# Patient Record
Sex: FEMALE | Race: WHITE | NOT HISPANIC OR LATINO | Employment: UNEMPLOYED | URBAN - NONMETROPOLITAN AREA
[De-identification: names, ages, dates, MRNs, and addresses within clinical notes are randomized per-mention and may not be internally consistent; named-entity substitution may affect disease eponyms.]

---

## 2017-02-22 ENCOUNTER — OFFICE VISIT (OUTPATIENT)
Dept: NEUROLOGY | Facility: CLINIC | Age: 62
End: 2017-02-22

## 2017-02-22 ENCOUNTER — LAB (OUTPATIENT)
Dept: LAB | Facility: HOSPITAL | Age: 62
End: 2017-02-22
Attending: PSYCHIATRY & NEUROLOGY

## 2017-02-22 VITALS
HEART RATE: 78 BPM | SYSTOLIC BLOOD PRESSURE: 130 MMHG | BODY MASS INDEX: 31.02 KG/M2 | RESPIRATION RATE: 16 BRPM | HEIGHT: 66 IN | WEIGHT: 193 LBS | OXYGEN SATURATION: 97 % | DIASTOLIC BLOOD PRESSURE: 72 MMHG

## 2017-02-22 DIAGNOSIS — E61.1 IRON DEFICIENCY: ICD-10-CM

## 2017-02-22 DIAGNOSIS — G40.109 LOCALIZATION-RELATED SYMPTOMATIC EPILEPSY AND EPILEPTIC SYNDROMES WITH SIMPLE PARTIAL SEIZURES, NOT INTRACTABLE, WITHOUT STATUS EPILEPTICUS (HCC): ICD-10-CM

## 2017-02-22 DIAGNOSIS — E61.1 IRON DEFICIENCY: Primary | ICD-10-CM

## 2017-02-22 LAB — FERRITIN SERPL-MCNC: 98 NG/ML (ref 11.1–264)

## 2017-02-22 PROCEDURE — 99214 OFFICE O/P EST MOD 30 MIN: CPT | Performed by: PSYCHIATRY & NEUROLOGY

## 2017-02-22 PROCEDURE — 36415 COLL VENOUS BLD VENIPUNCTURE: CPT

## 2017-02-22 PROCEDURE — 82728 ASSAY OF FERRITIN: CPT | Performed by: PSYCHIATRY & NEUROLOGY

## 2017-02-22 RX ORDER — BENZTROPINE MESYLATE 0.5 MG/1
0.5 TABLET ORAL DAILY
Refills: 2 | COMMUNITY
Start: 2017-02-03

## 2017-02-22 RX ORDER — HYDROCHLOROTHIAZIDE 25 MG/1
25 TABLET ORAL DAILY
Refills: 1 | COMMUNITY
Start: 2016-12-17 | End: 2017-08-29

## 2017-02-22 RX ORDER — METOPROLOL TARTRATE 100 MG/1
50 TABLET ORAL 2 TIMES DAILY
Refills: 0 | COMMUNITY
Start: 2017-01-10

## 2017-02-22 RX ORDER — LEVOTHYROXINE SODIUM 0.03 MG/1
25 TABLET ORAL DAILY
Refills: 3 | COMMUNITY
Start: 2017-02-07

## 2017-02-22 RX ORDER — ATORVASTATIN CALCIUM 80 MG/1
80 TABLET, FILM COATED ORAL DAILY
Refills: 5 | COMMUNITY
Start: 2017-02-07

## 2017-02-22 RX ORDER — LISINOPRIL 10 MG/1
10 TABLET ORAL DAILY
Refills: 1 | COMMUNITY
Start: 2016-12-17 | End: 2017-11-07

## 2017-02-22 NOTE — PROGRESS NOTES
[facility] NEUROLOGY [place] CONSULTATION   History of Present Illness     Date: 2/22/2017    Patient Identification  rTang Maynard is a 62 y.o. female.    Patient information was obtained from patient and parent.  History/Exam limitations: none.    CONSULTATION requested by: No ref. provider found      Chief Complaint   Tremors (Establish care)      Neurologic Problem   The patient's pertinent negatives include no syncope or weakness. Primary symptoms comment: Patient presents for tremors and jerking and follow up on an EEG.  She had an episode 13 months ago during which her hands twitched for 10 minutes but she never lost consciousness.. Episode onset: The tremors started 13 months ago, but she also developed jerking of her hands in the last month .  These happen usually at night as she is falling asleep. The neurological problem developed suddenly. Progression since onset: The tremors have been decreased with medication, but the jerks are unchanged since their onset. There was upper extremity (mainly the hands, but has noticed jerking or tremors in her toes once) focality noted. Associated symptoms include headaches. Pertinent negatives include no abdominal pain, chest pain, confusion, dizziness, fever, light-headedness, nausea, neck pain, palpitations, shortness of breath or vomiting. Treatments tried: She was started on benztropine 13 months ago and they believe that it has helped the tremors.  The treatment provided mild relief. Her past medical history is significant for a CVA (Possible TIA when at the [place]). There is no history of dementia or seizures. (Was diagnosed with schizophrenia in November 2010 and has been on medcation since.)        PMH:   Past Medical History   Diagnosis Date   • Anxiety    • Depression    • Heart disease    • Hyperlipidemia    • Hypertension    • Psychosis    • Sleep apnea    • Thyroid condition        Past Surgical History: History reviewed. No pertinent past  surgical history.    Family Hisotry:   Family History   Problem Relation Age of Onset   • Heart disease Mother    • Hypertension Mother    • Alcohol abuse Father        Social History:   Social History     Social History   • Marital status:      Spouse name: N/A   • Number of children: N/A   • Years of education: N/A     Occupational History   • Not on file.     Social History Main Topics   • Smoking status: Never Smoker   • Smokeless tobacco: Never Used   • Alcohol use No   • Drug use: No   • Sexual activity: Not on file     Other Topics Concern   • Not on file     Social History Narrative   • No narrative on file       Medications:   Current Outpatient Prescriptions   Medication Sig Dispense Refill   • atorvastatin (LIPITOR) 80 MG tablet   5   • benztropine (COGENTIN) 0.5 MG tablet   2   • hydrochlorothiazide (HYDRODIURIL) 25 MG tablet   1   • INVEGA SUSTENNA 156 MG/ML suspension IM injection   2   • levothyroxine (SYNTHROID, LEVOTHROID) 25 MCG tablet   3   • lisinopril (PRINIVIL,ZESTRIL) 10 MG tablet   1   • metFORMIN (GLUCOPHAGE) 500 MG tablet   3   • metoprolol tartrate (LOPRESSOR) 100 MG tablet   0     No current facility-administered medications for this visit.        Allergy: No Known Allergies    Review of Systems:  Review of Systems   Constitutional: Negative for chills and fever.   HENT: Negative for congestion, ear pain, hearing loss, rhinorrhea and sore throat.    Eyes: Negative for pain, discharge and redness.   Respiratory: Negative for cough, shortness of breath, wheezing and stridor.    Cardiovascular: Negative for chest pain, palpitations and leg swelling.   Gastrointestinal: Negative for abdominal pain, constipation, nausea and vomiting.   Endocrine: Negative for cold intolerance, heat intolerance and polyphagia.   Genitourinary: Negative for dysuria, flank pain, frequency and urgency.   Musculoskeletal: Negative for joint swelling, myalgias, neck pain and neck stiffness.   Skin: Negative  "for pallor, rash and wound.   Allergic/Immunologic: Negative for environmental allergies.   Neurological: Positive for seizures and headaches. Negative for dizziness, tremors, syncope, facial asymmetry, speech difficulty, weakness, light-headedness and numbness.   Hematological: Negative for adenopathy.   Psychiatric/Behavioral: Negative for confusion and hallucinations. The patient is not nervous/anxious.        Physical Exam     Vitals:    02/22/17 1052   BP: 130/72   BP Location: Left arm   Patient Position: Sitting   Pulse: 78   Resp: 16   SpO2: 97%   Weight: 193 lb (87.5 kg)   Height: 66\" (167.6 cm)     GENERAL: Patient is pleasant, cooperative, appears to be stated age.  Body habitus is endomorphic.  SKIN AND EXTREMITIES:  No skin rashes or lesions are noted.  No cyanosis, clubbing or edema of the extremities.    HEAD:  Head is normocephalic and atraumatic.    NECK: Neck are non-tender without thyromegaly or adenopathy.  Carotic upstrokes are 1+/4.  No cranial or cervical bruits.  The neck is supple with a full range of motion.   ENT: palate elevate symmetrically, no evidence of high arch palate, tongue midline erythema in posterior pharynx, Mallampati Classification Class III   CARDIOVASCULAR:  Regular rate and rhythm with normal S1 and S2 without rub or gallop.  RESPIRATORY:  Clear to auscultation without wheezes or crackle   ABDOMEN:  Soft and non-tender, positive bowel sound without hepatosplenomegaly  BACK:  Back is straight without midline defect.    PSYCH:  Higher cortical function/mental status:  The patient is alert.  She is oriented x3 to time, place and person.  Recent and the remote memory appear normal.  The patient has a good fund of knowledge.  There is no visual or auditory hallucination or suicidal or homicidal ideation.  SPEECH:There is no gross evidence of aphasia, dysarthria or agnosia.      CRANIAL NERVES:  Pupils are 4mm, equal round reactive to light, reacting briskly to 2mm without " afferent pupillary defect.  Visual fields are intact to confrontation testing.  Fundoscopic examination reveals sharp disk margins with normal vasculature.  No papilledema, hemorrhages or exudates.  Extraocular movements are full and smooth with normal pursuits and saccades.  No nystagmus noted.  The face is symmetric. palate elevate symmetrically, Tongue midline, positive gag reflex. The remainder of the cranial nerves are intact and symmetrical.    MOTOR: Strength is 5/5 throughout with normal tone and bulk with the following exceptions, 4/5 intrinsic muscles of the hands and feet.  No involuntary movements noted.    Deep Tendon Reflexes: are 2/4 and symmetrical in the upper extremities, 2/4 and symmetrical at the knees and 1/4 and symmetrical at the Achilles tendon.  Plantar responses were down-going bilaterally.    SENSATION:  Intact to pinprick, light touch, vibration and proprioception.  Coordination:  The patient normally performs finger-nose-finger, heel-to-knee-to-shin and rapid alternating movements in symmetrical fashion.    COORDINATION AND GAIT:  The patient walks with a narrow-based gait.  Patient is able to heel-toe and tandem walk forward and backwards without difficulty.  Romberg and monopedal  Romberg are negative.    MUSCULOSKELETAL: Range of motion normal, no clubbing, cyanosis, or edema.  No joint swelling.            Records Reviewed: I have personally reviewed her previous medical record.    Trang was seen today for tremors.    Diagnoses and all orders for this visit:    Iron deficiency  -     Ferritin; Future    Localization-related symptomatic epilepsy and epileptic syndromes with simple partial seizures, not intractable, without status epilepticus      Treatments:  1.  Regular sleep wake schedule  2.  Avoidance of sleep deprivation  3.  We'll obtain the EEG  4.  We have counseled patient has follow  I have counseled patient extensively on epileptic seizure.  Epileptic seizures are a  common and important medical problem, with about one in 11 persons experiencing at least one seizure at some point. The management of patients with epilepsy is often challenging, as evidenced by a recent report that over one half of all patients with epilepsy continue to experience at least occasional seizures despite treatment with antiepileptic medications.   We have also discussed various diagnostic testing.  Diagnostic studies must be tailored to this particular patient. Basic laboratory evaluation focuses on detecting systemic disturbances potentially associated with seizures were explored.  The imaging modality of choice is magnetic resonance imaging (MRI). Electroencephalography (EEG) is helpful in the evaluation of this patient. The utility of EEG includes detection of epileptiform activity, strengthening the putative diagnosis; identification of focal electrocerebral abnormalities suggesting a focal structural brain lesion; and documentation of specific epileptiform patterns associated with particular epilepsy syndromes would be very beneficial.   For patients with relatively infrequent seizures (in whom it is difficult to gauge the response to treatment without waiting many months for the next seizure to occur), a logical approach is to promptly increase the medication to the maximum tolerated dosage and maintain it at this level. This can be accomplished by increasing the agent until the patient begins to experience expected dose-related side effects, and then reducing the dosage to the immediately previous dosage that did not produce the adverse effects. Once a steady state with the refined dosage has been achieved, it is useful to check the trough drug serum level as a reference point for the maximum tolerated dosage.  If the patient eventually experiences a seizure when the serum level is at the reference point, the trial of medication can be considered a failure. This procedure enables assessment of  efficacy in a manner significantly more efficient than simply beginning at an average dosage and waiting for the next seizure before the next increase is implemented. If adequate seizure control-which should be defined as complete seizure control for most patients-is not achieved at the maximum tolerated dosage of the first medication, consideration should be given to referring the patient for neurologic consultation, if this has not yet been undertaken. Usually, a second agent will need to be added.    This Document is signed by Jim Belle MD, FAAN, FAASM February 22, 20178:29 PM

## 2017-03-06 ENCOUNTER — HOSPITAL ENCOUNTER (EMERGENCY)
Facility: HOSPITAL | Age: 62
Discharge: HOME | End: 2017-03-06
Attending: EMERGENCY MEDICINE | Admitting: EMERGENCY MEDICINE

## 2017-03-06 VITALS
BODY MASS INDEX: 31.34 KG/M2 | HEIGHT: 66 IN | TEMPERATURE: 97.8 F | OXYGEN SATURATION: 96 % | SYSTOLIC BLOOD PRESSURE: 139 MMHG | WEIGHT: 195 LBS | DIASTOLIC BLOOD PRESSURE: 68 MMHG | HEART RATE: 70 BPM | RESPIRATION RATE: 18 BRPM

## 2017-03-06 DIAGNOSIS — R13.10 DYSPHAGIA, UNSPECIFIED TYPE: Primary | ICD-10-CM

## 2017-03-06 LAB — S PYO AG THROAT QL: NEGATIVE

## 2017-03-06 PROCEDURE — 87880 STREP A ASSAY W/OPTIC: CPT | Performed by: EMERGENCY MEDICINE

## 2017-03-06 PROCEDURE — 87081 CULTURE SCREEN ONLY: CPT | Performed by: EMERGENCY MEDICINE

## 2017-03-06 PROCEDURE — 99283 EMERGENCY DEPT VISIT LOW MDM: CPT

## 2017-03-06 RX ORDER — ASPIRIN 325 MG
325 TABLET ORAL DAILY
COMMUNITY
End: 2017-09-28 | Stop reason: HOSPADM

## 2017-03-06 NOTE — ED PROVIDER NOTES
Subjective   History of Present Illness  TRIAGE CHIEF COMPLAINT:   Chief Complaint   Patient presents with   • Sore Throat         HPI: Trang Maynard   is a 62 y.o. female   who presents to the emergency department complaining of dysphagia.  Extremely poor historian due to severe psychiatric illness.  According to the  she has complained of sore throat intermittently for nearly a week and at times has difficulty swallowing.  The difficulty swallowing as well as the sore throat appeared to be intermittent in nature.  Patient currently denies any symptoms.  Her  is concerned this may be a side effect of long-term anti-psychotic medication.  He notes that she has some other symptoms including nonpurposeful movements which have been ongoing for quite some time.  He attempted to bring her to see the physician who prescribes these medications however the  answering the phone advised them to come to the ER.  He states that none of these symptoms are brand-new today.  No report of fever, cough, dyspnea, stridor.           Review of Systems   All other systems reviewed and are negative.      Past Medical History   Diagnosis Date   • Anxiety    • Depression    • Heart disease    • Hyperlipidemia    • Hypertension    • Psychosis    • Seizures    • Sleep apnea    • Thyroid condition    • Tremors of nervous system      Poss. r/t medications pt is on.       Allergies   Allergen Reactions   • Penicillins Other (See Comments)     Pt is not sure reaction.       History reviewed. No pertinent past surgical history.    Family History   Problem Relation Age of Onset   • Heart disease Mother    • Hypertension Mother    • Alcohol abuse Father        Social History     Social History   • Marital status:      Spouse name: N/A   • Number of children: N/A   • Years of education: N/A     Social History Main Topics   • Smoking status: Never Smoker   • Smokeless tobacco: Never Used   • Alcohol use No   •  Drug use: No   • Sexual activity: Defer     Other Topics Concern   • None     Social History Narrative   • None           Objective   Physical Exam    CONSTITUTIONAL: Awake, appears non-toxic   HENT: Atraumatic, normocephalic, oral mucosa pink and moist, airway patent. Nares patent without drainage. External ears normal.   EYES: Conjunctiva clear, EOMI, PERRL   NECK: Trachea midline, non-tender, supple   CARDIOVASCULAR: Normal heart rate, Normal rhythm, No murmurs, rubs, gallops   PULMONARY/CHEST: Clear to auscultation, no rhonchi, wheezes, or rales. Symmetrical breath sounds. Non-tender.   ABDOMINAL: Non-distended, soft, non-tender - no rebound or guarding. BS normal.   NEUROLOGIC: Non-focal, no gross sensory or motor deficits.   EXTREMITIES: No clubbing, cyanosis, or edema   SKIN: Warm, Dry, No erythema, No rash     No orders to display           EKG:         Procedures         ED Course  ED Course          ED COURSE / MEDICAL DECISION MAKING:   Workup unremarkable.  No evidence of pharyngitis or anaphylaxis.  This may well be a medication side effect.  Advised to follow-up with their physician later today or early tomorrow.    DECISION TO DISCHARGE/ADMIT: see ED care timeline       Electronically signed by: Boni Angeles MD, 3/6/2017 12:22 PM              [facility]    Final diagnoses:   Dysphagia, unspecified type            Boni Angeles MD  03/06/17 0380

## 2017-03-06 NOTE — DISCHARGE INSTRUCTIONS
Follow-up with your doctor today for further evaluation and treatment.  If you feel worse or have any concerns return to the ER.

## 2017-03-08 LAB — BACTERIA SPEC AEROBE CULT: NORMAL

## 2017-04-26 ENCOUNTER — APPOINTMENT (OUTPATIENT)
Dept: GENERAL RADIOLOGY | Facility: HOSPITAL | Age: 62
End: 2017-04-26

## 2017-04-26 ENCOUNTER — HOSPITAL ENCOUNTER (OUTPATIENT)
Facility: HOSPITAL | Age: 62
Setting detail: OBSERVATION
Discharge: HOME | End: 2017-04-27
Attending: EMERGENCY MEDICINE | Admitting: FAMILY MEDICINE

## 2017-04-26 ENCOUNTER — APPOINTMENT (OUTPATIENT)
Dept: CT IMAGING | Facility: HOSPITAL | Age: 62
End: 2017-04-26

## 2017-04-26 DIAGNOSIS — R11.15 INTRACTABLE CYCLICAL VOMITING WITH NAUSEA: Primary | ICD-10-CM

## 2017-04-26 DIAGNOSIS — E87.6 HYPOKALEMIA: ICD-10-CM

## 2017-04-26 DIAGNOSIS — E87.1 HYPONATREMIA: ICD-10-CM

## 2017-04-26 LAB
ALBUMIN SERPL-MCNC: 4.6 G/DL (ref 3.5–5)
ALBUMIN/GLOB SERPL: 1.4 G/DL (ref 1–2)
ALP SERPL-CCNC: 94 U/L (ref 38–126)
ALT SERPL W P-5'-P-CCNC: 51 U/L (ref 13–69)
ANION GAP SERPL CALCULATED.3IONS-SCNC: 14.4 MMOL/L
ANION GAP SERPL CALCULATED.3IONS-SCNC: 16.1 MMOL/L
AST SERPL-CCNC: 34 U/L (ref 15–46)
BACTERIA UR QL AUTO: ABNORMAL /HPF
BASOPHILS # BLD AUTO: 0.05 10*3/MM3 (ref 0–0.2)
BASOPHILS NFR BLD AUTO: 0.6 % (ref 0–2.5)
BILIRUB SERPL-MCNC: 1.1 MG/DL (ref 0.2–1.3)
BILIRUB UR QL STRIP: NEGATIVE
BUN BLD-MCNC: 6 MG/DL (ref 7–20)
BUN BLD-MCNC: 6 MG/DL (ref 7–20)
BUN/CREAT SERPL: 6 (ref 7.1–23.5)
BUN/CREAT SERPL: 7.5 (ref 7.1–23.5)
CALCIUM SPEC-SCNC: 9 MG/DL (ref 8.4–10.2)
CALCIUM SPEC-SCNC: 9.5 MG/DL (ref 8.4–10.2)
CHLORIDE SERPL-SCNC: 86 MMOL/L (ref 98–107)
CHLORIDE SERPL-SCNC: 94 MMOL/L (ref 98–107)
CK MB SERPL-CCNC: 0.99 NG/ML (ref 0.2–2.4)
CLARITY UR: CLEAR
CO2 SERPL-SCNC: 26 MMOL/L (ref 26–30)
CO2 SERPL-SCNC: 26 MMOL/L (ref 26–30)
COLOR UR: YELLOW
CREAT BLD-MCNC: 0.8 MG/DL (ref 0.6–1.3)
CREAT BLD-MCNC: 1 MG/DL (ref 0.6–1.3)
DEPRECATED RDW RBC AUTO: 39.5 FL (ref 37–54)
EOSINOPHIL # BLD AUTO: 0.11 10*3/MM3 (ref 0–0.7)
EOSINOPHIL NFR BLD AUTO: 1.3 % (ref 0–7)
ERYTHROCYTE [DISTWIDTH] IN BLOOD BY AUTOMATED COUNT: 12.8 % (ref 11.5–14.5)
GFR SERPL CREATININE-BSD FRML MDRD: 56 ML/MIN/1.73
GFR SERPL CREATININE-BSD FRML MDRD: 72 ML/MIN/1.73
GLOBULIN UR ELPH-MCNC: 3.3 GM/DL
GLUCOSE BLD-MCNC: 101 MG/DL (ref 74–98)
GLUCOSE BLD-MCNC: 98 MG/DL (ref 74–98)
GLUCOSE UR STRIP-MCNC: NEGATIVE MG/DL
HCT VFR BLD AUTO: 34.1 % (ref 37–47)
HGB BLD-MCNC: 12.2 G/DL (ref 12–16)
HGB UR QL STRIP.AUTO: ABNORMAL
HOLD SPECIMEN: NORMAL
HOLD SPECIMEN: NORMAL
HYALINE CASTS UR QL AUTO: ABNORMAL /LPF
IMM GRANULOCYTES # BLD: 0.02 10*3/MM3 (ref 0–0.06)
IMM GRANULOCYTES NFR BLD: 0.2 % (ref 0–0.6)
KETONES UR QL STRIP: NEGATIVE
LEUKOCYTE ESTERASE UR QL STRIP.AUTO: ABNORMAL
LIPASE SERPL-CCNC: 102 U/L (ref 23–300)
LYMPHOCYTES # BLD AUTO: 2.51 10*3/MM3 (ref 0.6–3.4)
LYMPHOCYTES NFR BLD AUTO: 29.2 % (ref 10–50)
MAGNESIUM SERPL-MCNC: 1.2 MG/DL (ref 1.6–2.3)
MCH RBC QN AUTO: 30.3 PG (ref 27–31)
MCHC RBC AUTO-ENTMCNC: 35.8 G/DL (ref 30–37)
MCV RBC AUTO: 84.8 FL (ref 81–99)
MONOCYTES # BLD AUTO: 0.76 10*3/MM3 (ref 0–0.9)
MONOCYTES NFR BLD AUTO: 8.8 % (ref 0–12)
MUCOUS THREADS URNS QL MICRO: ABNORMAL /HPF
NEUTROPHILS # BLD AUTO: 5.16 10*3/MM3 (ref 2–6.9)
NEUTROPHILS NFR BLD AUTO: 59.9 % (ref 37–80)
NITRITE UR QL STRIP: NEGATIVE
NRBC BLD MANUAL-RTO: 0 /100 WBC (ref 0–0)
PH UR STRIP.AUTO: 6.5 [PH] (ref 5–8)
PLATELET # BLD AUTO: 201 10*3/MM3 (ref 130–400)
PMV BLD AUTO: 10.3 FL (ref 6–12)
POTASSIUM BLD-SCNC: 3.1 MMOL/L (ref 3.5–5.1)
POTASSIUM BLD-SCNC: 3.4 MMOL/L (ref 3.5–5.1)
PROT SERPL-MCNC: 7.9 G/DL (ref 6.3–8.2)
PROT UR QL STRIP: NEGATIVE
RBC # BLD AUTO: 4.02 10*6/MM3 (ref 4.2–5.4)
RBC # UR: ABNORMAL /HPF
REF LAB TEST METHOD: ABNORMAL
SODIUM BLD-SCNC: 125 MMOL/L (ref 137–145)
SODIUM BLD-SCNC: 131 MMOL/L (ref 137–145)
SP GR UR STRIP: 1.01 (ref 1–1.03)
SQUAMOUS #/AREA URNS HPF: ABNORMAL /HPF
TROPONIN I SERPL-MCNC: <0.012 NG/ML (ref 0–0.03)
UROBILINOGEN UR QL STRIP: ABNORMAL
WBC NRBC COR # BLD: 8.61 10*3/MM3 (ref 4.8–10.8)
WBC UR QL AUTO: ABNORMAL /HPF
WHOLE BLOOD HOLD SPECIMEN: NORMAL
WHOLE BLOOD HOLD SPECIMEN: NORMAL

## 2017-04-26 PROCEDURE — 71020 HC CHEST PA AND LATERAL: CPT

## 2017-04-26 PROCEDURE — 80053 COMPREHEN METABOLIC PANEL: CPT | Performed by: PHYSICIAN ASSISTANT

## 2017-04-26 PROCEDURE — 96376 TX/PRO/DX INJ SAME DRUG ADON: CPT

## 2017-04-26 PROCEDURE — G0378 HOSPITAL OBSERVATION PER HR: HCPCS

## 2017-04-26 PROCEDURE — 83735 ASSAY OF MAGNESIUM: CPT | Performed by: FAMILY MEDICINE

## 2017-04-26 PROCEDURE — 82553 CREATINE MB FRACTION: CPT | Performed by: PHYSICIAN ASSISTANT

## 2017-04-26 PROCEDURE — 81001 URINALYSIS AUTO W/SCOPE: CPT

## 2017-04-26 PROCEDURE — 74176 CT ABD & PELVIS W/O CONTRAST: CPT

## 2017-04-26 PROCEDURE — 25010000002 MAGNESIUM SULFATE 2 GM/50ML SOLUTION: Performed by: EMERGENCY MEDICINE

## 2017-04-26 PROCEDURE — 96361 HYDRATE IV INFUSION ADD-ON: CPT

## 2017-04-26 PROCEDURE — 84484 ASSAY OF TROPONIN QUANT: CPT | Performed by: PHYSICIAN ASSISTANT

## 2017-04-26 PROCEDURE — 93005 ELECTROCARDIOGRAM TRACING: CPT | Performed by: PHYSICIAN ASSISTANT

## 2017-04-26 PROCEDURE — 99284 EMERGENCY DEPT VISIT MOD MDM: CPT

## 2017-04-26 PROCEDURE — 85025 COMPLETE CBC W/AUTO DIFF WBC: CPT | Performed by: PHYSICIAN ASSISTANT

## 2017-04-26 PROCEDURE — 25010000002 ONDANSETRON PER 1 MG: Performed by: PHYSICIAN ASSISTANT

## 2017-04-26 PROCEDURE — 96365 THER/PROPH/DIAG IV INF INIT: CPT

## 2017-04-26 PROCEDURE — 83690 ASSAY OF LIPASE: CPT | Performed by: PHYSICIAN ASSISTANT

## 2017-04-26 PROCEDURE — 96375 TX/PRO/DX INJ NEW DRUG ADDON: CPT

## 2017-04-26 RX ORDER — ONDANSETRON 2 MG/ML
4 INJECTION INTRAMUSCULAR; INTRAVENOUS ONCE
Status: COMPLETED | OUTPATIENT
Start: 2017-04-26 | End: 2017-04-26

## 2017-04-26 RX ORDER — POTASSIUM CHLORIDE 750 MG/1
20 CAPSULE, EXTENDED RELEASE ORAL ONCE
Status: COMPLETED | OUTPATIENT
Start: 2017-04-26 | End: 2017-04-26

## 2017-04-26 RX ORDER — SODIUM CHLORIDE 9 MG/ML
125 INJECTION, SOLUTION INTRAVENOUS CONTINUOUS
Status: DISCONTINUED | OUTPATIENT
Start: 2017-04-26 | End: 2017-04-27

## 2017-04-26 RX ORDER — MAGNESIUM CARB/ALUMINUM HYDROX 105-160MG
300 TABLET,CHEWABLE ORAL ONCE
Status: COMPLETED | OUTPATIENT
Start: 2017-04-26 | End: 2017-04-26

## 2017-04-26 RX ORDER — POTASSIUM CHLORIDE 1.5 G/1.77G
20 POWDER, FOR SOLUTION ORAL ONCE
Status: COMPLETED | OUTPATIENT
Start: 2017-04-26 | End: 2017-04-26

## 2017-04-26 RX ORDER — SODIUM CHLORIDE 0.9 % (FLUSH) 0.9 %
10 SYRINGE (ML) INJECTION AS NEEDED
Status: DISCONTINUED | OUTPATIENT
Start: 2017-04-26 | End: 2017-04-27 | Stop reason: HOSPADM

## 2017-04-26 RX ORDER — MAGNESIUM SULFATE HEPTAHYDRATE 40 MG/ML
2 INJECTION, SOLUTION INTRAVENOUS ONCE
Status: COMPLETED | OUTPATIENT
Start: 2017-04-26 | End: 2017-04-26

## 2017-04-26 RX ADMIN — POTASSIUM CHLORIDE 20 MEQ: 1.5 POWDER, FOR SOLUTION ORAL at 20:33

## 2017-04-26 RX ADMIN — POTASSIUM CHLORIDE 20 MEQ: 750 CAPSULE, EXTENDED RELEASE ORAL at 21:06

## 2017-04-26 RX ADMIN — SODIUM CHLORIDE 125 ML/HR: 9 INJECTION, SOLUTION INTRAVENOUS at 18:46

## 2017-04-26 RX ADMIN — MAGNESIUM SULFATE HEPTAHYDRATE 2 G: 40 INJECTION, SOLUTION INTRAVENOUS at 23:20

## 2017-04-26 RX ADMIN — ONDANSETRON 4 MG: 2 INJECTION INTRAMUSCULAR; INTRAVENOUS at 19:34

## 2017-04-26 RX ADMIN — ONDANSETRON 4 MG: 2 INJECTION INTRAMUSCULAR; INTRAVENOUS at 21:06

## 2017-04-26 RX ADMIN — MAGNESIUM CITRATE 300 ML: 1.75 LIQUID ORAL at 18:46

## 2017-04-26 RX ADMIN — SODIUM CHLORIDE 125 ML/HR: 9 INJECTION, SOLUTION INTRAVENOUS at 23:21

## 2017-04-26 RX ADMIN — SODIUM CHLORIDE 1000 ML: 9 INJECTION, SOLUTION INTRAVENOUS at 20:32

## 2017-04-27 VITALS
DIASTOLIC BLOOD PRESSURE: 77 MMHG | TEMPERATURE: 97.8 F | SYSTOLIC BLOOD PRESSURE: 175 MMHG | WEIGHT: 180.4 LBS | OXYGEN SATURATION: 96 % | HEIGHT: 67 IN | HEART RATE: 71 BPM | BODY MASS INDEX: 28.31 KG/M2 | RESPIRATION RATE: 20 BRPM

## 2017-04-27 PROBLEM — E87.6 HYPOKALEMIA: Status: ACTIVE | Noted: 2017-04-27

## 2017-04-27 PROBLEM — E83.42 HYPOMAGNESEMIA: Status: ACTIVE | Noted: 2017-04-27

## 2017-04-27 PROBLEM — K59.00 CONSTIPATION: Status: ACTIVE | Noted: 2017-04-27

## 2017-04-27 PROBLEM — F32.A DEPRESSION: Status: ACTIVE | Noted: 2017-04-27

## 2017-04-27 PROBLEM — D25.9 UTERINE FIBROID: Status: ACTIVE | Noted: 2017-04-27

## 2017-04-27 PROBLEM — E78.5 DYSLIPIDEMIA: Status: ACTIVE | Noted: 2017-04-27

## 2017-04-27 PROBLEM — F41.9 ANXIETY: Status: ACTIVE | Noted: 2017-04-27

## 2017-04-27 PROBLEM — R25.1 TREMORS OF NERVOUS SYSTEM: Status: ACTIVE | Noted: 2017-04-27

## 2017-04-27 PROBLEM — E87.1 HYPONATREMIA: Status: ACTIVE | Noted: 2017-04-27

## 2017-04-27 PROBLEM — I10 ESSENTIAL HYPERTENSION: Status: ACTIVE | Noted: 2017-04-27

## 2017-04-27 LAB
ANION GAP SERPL CALCULATED.3IONS-SCNC: 13.1 MMOL/L
BASOPHILS # BLD AUTO: 0.06 10*3/MM3 (ref 0–0.2)
BASOPHILS NFR BLD AUTO: 0.4 % (ref 0–2.5)
BUN BLD-MCNC: 6 MG/DL (ref 7–20)
BUN/CREAT SERPL: 7.5 (ref 7.1–23.5)
CALCIUM SPEC-SCNC: 9.1 MG/DL (ref 8.4–10.2)
CHLORIDE SERPL-SCNC: 102 MMOL/L (ref 98–107)
CO2 SERPL-SCNC: 29 MMOL/L (ref 26–30)
CREAT BLD-MCNC: 0.8 MG/DL (ref 0.6–1.3)
DEPRECATED RDW RBC AUTO: 40.7 FL (ref 37–54)
EOSINOPHIL # BLD AUTO: 0.1 10*3/MM3 (ref 0–0.7)
EOSINOPHIL NFR BLD AUTO: 0.7 % (ref 0–7)
ERYTHROCYTE [DISTWIDTH] IN BLOOD BY AUTOMATED COUNT: 12.9 % (ref 11.5–14.5)
GFR SERPL CREATININE-BSD FRML MDRD: 72 ML/MIN/1.73
GLUCOSE BLD-MCNC: 98 MG/DL (ref 74–98)
HCT VFR BLD AUTO: 37.9 % (ref 37–47)
HGB BLD-MCNC: 12.9 G/DL (ref 12–16)
IMM GRANULOCYTES # BLD: 0.06 10*3/MM3 (ref 0–0.06)
IMM GRANULOCYTES NFR BLD: 0.4 % (ref 0–0.6)
LYMPHOCYTES # BLD AUTO: 1.96 10*3/MM3 (ref 0.6–3.4)
LYMPHOCYTES NFR BLD AUTO: 14.6 % (ref 10–50)
MAGNESIUM SERPL-MCNC: 2.2 MG/DL (ref 1.6–2.3)
MCH RBC QN AUTO: 29.5 PG (ref 27–31)
MCHC RBC AUTO-ENTMCNC: 34 G/DL (ref 30–37)
MCV RBC AUTO: 86.7 FL (ref 81–99)
MONOCYTES # BLD AUTO: 1.01 10*3/MM3 (ref 0–0.9)
MONOCYTES NFR BLD AUTO: 7.5 % (ref 0–12)
NEUTROPHILS # BLD AUTO: 10.26 10*3/MM3 (ref 2–6.9)
NEUTROPHILS NFR BLD AUTO: 76.4 % (ref 37–80)
NRBC BLD MANUAL-RTO: 0 /100 WBC (ref 0–0)
PLATELET # BLD AUTO: 239 10*3/MM3 (ref 130–400)
PMV BLD AUTO: 10.8 FL (ref 6–12)
POTASSIUM BLD-SCNC: 4.1 MMOL/L (ref 3.5–5.1)
RBC # BLD AUTO: 4.37 10*6/MM3 (ref 4.2–5.4)
SODIUM BLD-SCNC: 140 MMOL/L (ref 137–145)
WBC NRBC COR # BLD: 13.45 10*3/MM3 (ref 4.8–10.8)

## 2017-04-27 PROCEDURE — 85025 COMPLETE CBC W/AUTO DIFF WBC: CPT | Performed by: FAMILY MEDICINE

## 2017-04-27 PROCEDURE — 96372 THER/PROPH/DIAG INJ SC/IM: CPT

## 2017-04-27 PROCEDURE — 96375 TX/PRO/DX INJ NEW DRUG ADDON: CPT

## 2017-04-27 PROCEDURE — 83735 ASSAY OF MAGNESIUM: CPT | Performed by: NURSE PRACTITIONER

## 2017-04-27 PROCEDURE — 80048 BASIC METABOLIC PNL TOTAL CA: CPT | Performed by: FAMILY MEDICINE

## 2017-04-27 PROCEDURE — 99235 HOSP IP/OBS SAME DATE MOD 70: CPT | Performed by: FAMILY MEDICINE

## 2017-04-27 PROCEDURE — G0378 HOSPITAL OBSERVATION PER HR: HCPCS

## 2017-04-27 PROCEDURE — 25810000003 SODIUM CHLORIDE 0.9 % WITH KCL 20 MEQ 20-0.9 MEQ/L-% SOLUTION: Performed by: FAMILY MEDICINE

## 2017-04-27 PROCEDURE — 96376 TX/PRO/DX INJ SAME DRUG ADON: CPT

## 2017-04-27 PROCEDURE — 25010000002 ENOXAPARIN PER 10 MG: Performed by: FAMILY MEDICINE

## 2017-04-27 RX ORDER — ACETAMINOPHEN 325 MG/1
650 TABLET ORAL EVERY 4 HOURS PRN
Status: DISCONTINUED | OUTPATIENT
Start: 2017-04-27 | End: 2017-04-27 | Stop reason: HOSPADM

## 2017-04-27 RX ORDER — ONDANSETRON 2 MG/ML
4 INJECTION INTRAMUSCULAR; INTRAVENOUS EVERY 6 HOURS PRN
Status: DISCONTINUED | OUTPATIENT
Start: 2017-04-27 | End: 2017-04-27 | Stop reason: HOSPADM

## 2017-04-27 RX ORDER — METOPROLOL TARTRATE 5 MG/5ML
5 INJECTION INTRAVENOUS EVERY 6 HOURS
Status: DISCONTINUED | OUTPATIENT
Start: 2017-04-27 | End: 2017-04-27 | Stop reason: HOSPADM

## 2017-04-27 RX ORDER — ONDANSETRON 4 MG/1
4 TABLET, ORALLY DISINTEGRATING ORAL EVERY 6 HOURS PRN
Status: DISCONTINUED | OUTPATIENT
Start: 2017-04-27 | End: 2017-04-27 | Stop reason: HOSPADM

## 2017-04-27 RX ORDER — SODIUM CHLORIDE AND POTASSIUM CHLORIDE 150; 900 MG/100ML; MG/100ML
125 INJECTION, SOLUTION INTRAVENOUS CONTINUOUS
Status: DISCONTINUED | OUTPATIENT
Start: 2017-04-27 | End: 2017-04-27 | Stop reason: HOSPADM

## 2017-04-27 RX ORDER — POTASSIUM CHLORIDE 750 MG/1
20 CAPSULE, EXTENDED RELEASE ORAL ONCE
Status: COMPLETED | OUTPATIENT
Start: 2017-04-27 | End: 2017-04-27

## 2017-04-27 RX ORDER — ONDANSETRON 4 MG/1
4 TABLET, FILM COATED ORAL EVERY 6 HOURS PRN
Status: DISCONTINUED | OUTPATIENT
Start: 2017-04-27 | End: 2017-04-27 | Stop reason: HOSPADM

## 2017-04-27 RX ORDER — ENALAPRILAT 2.5 MG/2ML
1.25 INJECTION INTRAVENOUS EVERY 6 HOURS
Status: DISCONTINUED | OUTPATIENT
Start: 2017-04-27 | End: 2017-04-27 | Stop reason: HOSPADM

## 2017-04-27 RX ORDER — BENZTROPINE MESYLATE 1 MG/1
0.5 TABLET ORAL 2 TIMES DAILY
Status: DISCONTINUED | OUTPATIENT
Start: 2017-04-27 | End: 2017-04-27 | Stop reason: HOSPADM

## 2017-04-27 RX ORDER — BISACODYL 10 MG
10 SUPPOSITORY, RECTAL RECTAL DAILY PRN
Status: DISCONTINUED | OUTPATIENT
Start: 2017-04-27 | End: 2017-04-27 | Stop reason: HOSPADM

## 2017-04-27 RX ORDER — PANTOPRAZOLE SODIUM 40 MG/1
40 TABLET, DELAYED RELEASE ORAL DAILY
Qty: 14 TABLET | Refills: 0 | Status: SHIPPED | OUTPATIENT
Start: 2017-04-27 | End: 2017-05-11

## 2017-04-27 RX ORDER — IBUPROFEN 400 MG/1
400 TABLET ORAL EVERY 4 HOURS PRN
Status: DISCONTINUED | OUTPATIENT
Start: 2017-04-27 | End: 2017-04-27 | Stop reason: HOSPADM

## 2017-04-27 RX ORDER — LEVOTHYROXINE SODIUM 0.03 MG/1
25 TABLET ORAL DAILY
Status: DISCONTINUED | OUTPATIENT
Start: 2017-04-27 | End: 2017-04-27 | Stop reason: HOSPADM

## 2017-04-27 RX ORDER — SODIUM CHLORIDE 0.9 % (FLUSH) 0.9 %
1-10 SYRINGE (ML) INJECTION AS NEEDED
Status: DISCONTINUED | OUTPATIENT
Start: 2017-04-27 | End: 2017-04-27 | Stop reason: HOSPADM

## 2017-04-27 RX ADMIN — POTASSIUM CHLORIDE AND SODIUM CHLORIDE 125 ML/HR: 900; 150 INJECTION, SOLUTION INTRAVENOUS at 01:38

## 2017-04-27 RX ADMIN — ENOXAPARIN SODIUM 40 MG: 40 INJECTION SUBCUTANEOUS at 08:41

## 2017-04-27 RX ADMIN — METOPROLOL TARTRATE 5 MG: 1 INJECTION, SOLUTION INTRAVENOUS at 08:41

## 2017-04-27 RX ADMIN — POTASSIUM CHLORIDE 20 MEQ: 750 CAPSULE, EXTENDED RELEASE ORAL at 01:38

## 2017-04-27 RX ADMIN — BENZTROPINE MESYLATE 0.5 MG: 1 TABLET ORAL at 08:51

## 2017-04-27 RX ADMIN — ENALAPRILAT 1.25 MG: 1.25 INJECTION INTRAVENOUS at 08:41

## 2017-04-27 RX ADMIN — LEVOTHYROXINE SODIUM 25 MCG: 25 TABLET ORAL at 08:41

## 2017-04-27 RX ADMIN — BISACODYL 10 MG: 10 SUPPOSITORY RECTAL at 01:54

## 2017-04-27 RX ADMIN — METOPROLOL TARTRATE 5 MG: 1 INJECTION, SOLUTION INTRAVENOUS at 01:38

## 2017-04-27 RX ADMIN — ENALAPRILAT 1.25 MG: 1.25 INJECTION INTRAVENOUS at 01:38

## 2017-05-25 ENCOUNTER — OFFICE VISIT (OUTPATIENT)
Dept: NEUROLOGY | Facility: CLINIC | Age: 62
End: 2017-05-25

## 2017-05-25 VITALS
HEART RATE: 69 BPM | OXYGEN SATURATION: 97 % | HEIGHT: 67 IN | DIASTOLIC BLOOD PRESSURE: 82 MMHG | SYSTOLIC BLOOD PRESSURE: 138 MMHG | WEIGHT: 180.38 LBS | BODY MASS INDEX: 28.31 KG/M2

## 2017-05-25 DIAGNOSIS — R41.3 MEMORY LOSS: Primary | ICD-10-CM

## 2017-05-25 DIAGNOSIS — R25.1 TREMOR: ICD-10-CM

## 2017-05-25 PROCEDURE — 99213 OFFICE O/P EST LOW 20 MIN: CPT | Performed by: PSYCHIATRY & NEUROLOGY

## 2017-05-25 RX ORDER — GLUCOSAM/CHON-MSM1/C/MANG/BOSW 500-416.6
TABLET ORAL
Refills: 3 | COMMUNITY
Start: 2017-03-07 | End: 2017-09-25

## 2017-05-25 RX ORDER — LANCING DEVICE
EACH MISCELLANEOUS
Refills: 0 | COMMUNITY
Start: 2017-03-07 | End: 2017-09-25

## 2017-06-14 ENCOUNTER — TRANSCRIBE ORDERS (OUTPATIENT)
Dept: ADMINISTRATIVE | Facility: HOSPITAL | Age: 62
End: 2017-06-14

## 2017-06-14 DIAGNOSIS — Z13.9 SCREENING: Primary | ICD-10-CM

## 2017-06-29 ENCOUNTER — HOSPITAL ENCOUNTER (OUTPATIENT)
Dept: MAMMOGRAPHY | Facility: HOSPITAL | Age: 62
Discharge: HOME | End: 2017-06-29
Admitting: NURSE PRACTITIONER

## 2017-06-29 DIAGNOSIS — Z13.9 SCREENING: ICD-10-CM

## 2017-06-29 PROCEDURE — G0202 SCR MAMMO BI INCL CAD: HCPCS

## 2017-06-29 PROCEDURE — 77063 BREAST TOMOSYNTHESIS BI: CPT

## 2017-08-29 ENCOUNTER — LAB (OUTPATIENT)
Dept: LAB | Facility: HOSPITAL | Age: 62
End: 2017-08-29
Attending: INTERNAL MEDICINE

## 2017-08-29 ENCOUNTER — PREP FOR SURGERY (OUTPATIENT)
Dept: OTHER | Facility: HOSPITAL | Age: 62
End: 2017-08-29

## 2017-08-29 ENCOUNTER — OFFICE VISIT (OUTPATIENT)
Dept: GASTROENTEROLOGY | Facility: CLINIC | Age: 62
End: 2017-08-29

## 2017-08-29 VITALS
RESPIRATION RATE: 20 BRPM | SYSTOLIC BLOOD PRESSURE: 141 MMHG | BODY MASS INDEX: 25.58 KG/M2 | TEMPERATURE: 97.3 F | WEIGHT: 163 LBS | DIASTOLIC BLOOD PRESSURE: 73 MMHG | HEART RATE: 67 BPM | HEIGHT: 67 IN

## 2017-08-29 DIAGNOSIS — K59.00 CONSTIPATION, UNSPECIFIED CONSTIPATION TYPE: ICD-10-CM

## 2017-08-29 DIAGNOSIS — R11.0 NAUSEA: Primary | ICD-10-CM

## 2017-08-29 DIAGNOSIS — R19.7 DIARRHEA, UNSPECIFIED TYPE: ICD-10-CM

## 2017-08-29 DIAGNOSIS — R63.4 WEIGHT LOSS: ICD-10-CM

## 2017-08-29 DIAGNOSIS — R10.33 PERIUMBILICAL ABDOMINAL PAIN: ICD-10-CM

## 2017-08-29 DIAGNOSIS — R11.0 NAUSEA: ICD-10-CM

## 2017-08-29 DIAGNOSIS — E87.1 HYPONATREMIA: ICD-10-CM

## 2017-08-29 LAB
ALBUMIN SERPL-MCNC: 4.1 G/DL (ref 3.5–5)
ALBUMIN/GLOB SERPL: 1.4 G/DL (ref 1–2)
ALP SERPL-CCNC: 85 U/L (ref 38–126)
ALT SERPL W P-5'-P-CCNC: 34 U/L (ref 13–69)
ANION GAP SERPL CALCULATED.3IONS-SCNC: 14.7 MMOL/L
AST SERPL-CCNC: 25 U/L (ref 15–46)
BASOPHILS # BLD AUTO: 0.09 10*3/MM3 (ref 0–0.2)
BASOPHILS NFR BLD AUTO: 0.9 % (ref 0–2.5)
BILIRUB SERPL-MCNC: 0.5 MG/DL (ref 0.2–1.3)
BUN BLD-MCNC: 7 MG/DL (ref 7–20)
BUN/CREAT SERPL: 7.8 (ref 7.1–23.5)
CALCIUM SPEC-SCNC: 9.5 MG/DL (ref 8.4–10.2)
CHLORIDE SERPL-SCNC: 109 MMOL/L (ref 98–107)
CO2 SERPL-SCNC: 22 MMOL/L (ref 26–30)
CREAT BLD-MCNC: 0.9 MG/DL (ref 0.6–1.3)
DEPRECATED RDW RBC AUTO: 49.9 FL (ref 37–54)
EOSINOPHIL # BLD AUTO: 0.29 10*3/MM3 (ref 0–0.7)
EOSINOPHIL NFR BLD AUTO: 3 % (ref 0–7)
ERYTHROCYTE [DISTWIDTH] IN BLOOD BY AUTOMATED COUNT: 14.5 % (ref 11.5–14.5)
GFR SERPL CREATININE-BSD FRML MDRD: 63 ML/MIN/1.73
GLOBULIN UR ELPH-MCNC: 3 GM/DL
GLUCOSE BLD-MCNC: 67 MG/DL (ref 74–98)
HCT VFR BLD AUTO: 35.9 % (ref 37–47)
HGB BLD-MCNC: 11.6 G/DL (ref 12–16)
IMM GRANULOCYTES # BLD: 0.05 10*3/MM3 (ref 0–0.06)
IMM GRANULOCYTES NFR BLD: 0.5 % (ref 0–0.6)
LIPASE SERPL-CCNC: 58 U/L (ref 23–300)
LYMPHOCYTES # BLD AUTO: 2.13 10*3/MM3 (ref 0.6–3.4)
LYMPHOCYTES NFR BLD AUTO: 22 % (ref 10–50)
MCH RBC QN AUTO: 30.8 PG (ref 27–31)
MCHC RBC AUTO-ENTMCNC: 32.3 G/DL (ref 30–37)
MCV RBC AUTO: 95.2 FL (ref 81–99)
MONOCYTES # BLD AUTO: 0.8 10*3/MM3 (ref 0–0.9)
MONOCYTES NFR BLD AUTO: 8.3 % (ref 0–12)
NEUTROPHILS # BLD AUTO: 6.31 10*3/MM3 (ref 2–6.9)
NEUTROPHILS NFR BLD AUTO: 65.3 % (ref 37–80)
NRBC BLD MANUAL-RTO: 0 /100 WBC (ref 0–0)
PLATELET # BLD AUTO: 228 10*3/MM3 (ref 130–400)
PMV BLD AUTO: 11.3 FL (ref 6–12)
POTASSIUM BLD-SCNC: 3.7 MMOL/L (ref 3.5–5.1)
PROT SERPL-MCNC: 7.1 G/DL (ref 6.3–8.2)
RBC # BLD AUTO: 3.77 10*6/MM3 (ref 4.2–5.4)
SODIUM BLD-SCNC: 142 MMOL/L (ref 137–145)
TSH SERPL DL<=0.05 MIU/L-ACNC: 2.34 MIU/ML (ref 0.47–4.68)
WBC NRBC COR # BLD: 9.67 10*3/MM3 (ref 4.8–10.8)

## 2017-08-29 PROCEDURE — 80053 COMPREHEN METABOLIC PANEL: CPT | Performed by: INTERNAL MEDICINE

## 2017-08-29 PROCEDURE — 99205 OFFICE O/P NEW HI 60 MIN: CPT | Performed by: INTERNAL MEDICINE

## 2017-08-29 PROCEDURE — 83690 ASSAY OF LIPASE: CPT | Performed by: INTERNAL MEDICINE

## 2017-08-29 PROCEDURE — 84443 ASSAY THYROID STIM HORMONE: CPT | Performed by: INTERNAL MEDICINE

## 2017-08-29 PROCEDURE — 85025 COMPLETE CBC W/AUTO DIFF WBC: CPT | Performed by: INTERNAL MEDICINE

## 2017-08-29 RX ORDER — SODIUM CHLORIDE 1000 MG
1 TABLET, SOLUBLE MISCELLANEOUS DAILY
COMMUNITY
End: 2018-05-29

## 2017-08-29 RX ORDER — ONDANSETRON 4 MG/1
4 TABLET, FILM COATED ORAL EVERY 8 HOURS PRN
Qty: 30 TABLET | Refills: 1 | Status: SHIPPED | OUTPATIENT
Start: 2017-08-29 | End: 2017-09-22 | Stop reason: SDUPTHER

## 2017-08-29 RX ORDER — SODIUM CHLORIDE 9 MG/ML
70 INJECTION, SOLUTION INTRAVENOUS CONTINUOUS PRN
Status: CANCELLED | OUTPATIENT
Start: 2017-08-29

## 2017-08-29 RX ORDER — SODIUM CHLORIDE 0.9 % (FLUSH) 0.9 %
1-10 SYRINGE (ML) INJECTION AS NEEDED
Status: CANCELLED | OUTPATIENT
Start: 2017-08-29

## 2017-08-29 NOTE — PATIENT INSTRUCTIONS
1. Check CBC, CMP, lipase and TSH.  2. Zofran 4 mg tablet.  1-to 3 times a day by mouth as needed for nausea.  3. Upper endoscopy (EGD) counseling:  Description of the procedure, risks, benefits, alternatives and options including nonoperative options were discussed with the patient in detail.  The patient understands and wishes to proceed.  4. Colonoscopy: Description of the procedure, risks benefits alternatives and options including non-operative options were discussed with the patient in detail.  The patient understands and wishes to proceed.  5. Discussed with the patient, and her  in detail.  Findings of previous workup including CT of abdomen and pelvis were told to the patient, and her  including fibroid and kidney stones.  6. The patient should decrease free water intake.  There appears to be an element of compulsive water drinking.  7. The patient is also a candidate for syndrome of inappropriate ADH secretion due to recurrent nausea. Opportunity was given to ask questions.      Note:  Total time spent 60 minutes.  More than half of the time was spent face-to-face discussion.

## 2017-08-29 NOTE — PROGRESS NOTES
Chief Complaint   Patient presents with   • Abdominal Pain   • Diarrhea   • Nausea       History of Present Illness     There is history of periumbilical abdominal pain off and on for the last 3 years.  The pain is gradual in onset, mild-moderate in severity and aching in character.  Frequency being 2-3 times a week.  The pain may last for several minutes.  There is no radiation of abdominal pain.  Eating worsens the abdominal discomfort.  No definite relieving factors of abdominal pain.    The patient has history of nausea off and on for the last 3 years.  Nausea is sudden in onset, moderate in severity and frequency being 3-5 times a week.  The patient may feel nauseated for 2-3 hours.  There is no associated vomiting.  Nausea is worsened by eating.  There are no relieving factors of nausea.     The patient has history of diarrhea off-and-on for the last 2 years .  Diarrhea is episodic and is rather severe, frequency of bowel movements being 6-8 times a day.  The stools are described as loose and watery.  There is nocturnal element of diarrhea.  The diarrhea is associated with tenesmus and urgency.  Diarrheal episodes may last for several days to 2 weeks.  Episodes of diarrhea followed by episodes of constipation.  The patient has a history of rather severe constipation off and on for the last 3 years. This is described as hard stools perhaps one bowel movement once a week or less often. The patient takes frequent laxatives to have a bowel movement. There is no associated rectal pain.  The patient has a tendency to be constipated more often than have diarrhea.    The patient has lost about 30 pounds unintentionally over the last 4 months.  There is no history of overt GI bleed (Hematemesis, melena or hematochezia).  There is no history of reflux.  The patient denies dysphagia or odynophagia.  There is no history of liver or pancreatic disease.  The patient never had a colonoscopy in the past.  Of interest the  patient drinks significant amount water daily.     Review of Systems   Constitutional: Positive for unexpected weight change. Negative for appetite change, chills, fatigue and fever.   HENT: Negative for mouth sores, nosebleeds and trouble swallowing.    Eyes: Negative for discharge and redness.   Respiratory: Positive for apnea. Negative for cough and shortness of breath.    Cardiovascular: Positive for chest pain and palpitations. Negative for leg swelling.   Gastrointestinal: Positive for abdominal pain, constipation, diarrhea, nausea and vomiting. Negative for abdominal distention, anal bleeding and blood in stool.   Endocrine: Negative for cold intolerance, heat intolerance and polydipsia.   Genitourinary: Negative for dysuria, hematuria and urgency.   Musculoskeletal: Positive for back pain. Negative for arthralgias, joint swelling and myalgias.   Skin: Negative for rash.   Allergic/Immunologic: Negative for food allergies and immunocompromised state.   Neurological: Positive for seizures. Negative for dizziness, syncope and headaches.   Hematological: Negative for adenopathy. Bruises/bleeds easily.   Psychiatric/Behavioral: Negative for dysphoric mood. The patient is not nervous/anxious and is not hyperactive.      Patient Active Problem List   Diagnosis   • Intractable cyclical vomiting with nausea   • Uterine fibroid   • Anxiety   • Dyslipidemia   • Tremors of nervous system   • Hyponatremia   • Hypokalemia   • Hypomagnesemia   • Depression   • Constipation   • Essential hypertension     Past Medical History:   Diagnosis Date   • Anxiety    • CAD (coronary artery disease)    • Depression    • Diabetes mellitus    • Heart disease    • Hyperlipidemia    • Hypertension    • Low sodium levels 2017   • Psychosis    • PVD (peripheral vascular disease)    • Schizophrenia    • Seizures    • Sleep apnea    • Thyroid condition    • TIA (transient ischemic attack)    • Tremors of nervous system     Poss. r/t  "medications pt is on.   • Vitamin B12 deficiency      Past Surgical History:   Procedure Laterality Date   • CARDIAC SURGERY      stents 2005, 2007   • CYST REMOVAL     • TUBAL ABDOMINAL LIGATION       Family History   Problem Relation Age of Onset   • Heart disease Mother    • Hypertension Mother    • Alcohol abuse Father      Social History   Substance Use Topics   • Smoking status: Former Smoker   • Smokeless tobacco: Never Used      Comment: quit 1995   • Alcohol use No       Current Outpatient Prescriptions:   •  aspirin 325 MG tablet, Take 325 mg by mouth Daily., Disp: , Rfl:   •  atorvastatin (LIPITOR) 80 MG tablet, Take 80 mg by mouth Daily., Disp: , Rfl: 5  •  benztropine (COGENTIN) 0.5 MG tablet, Take 0.5 mg by mouth Daily., Disp: , Rfl: 2  •  glucose blood test strip, , Disp: , Rfl: 3  •  INVEGA SUSTENNA 156 MG/ML suspension IM injection, Inject 156 mg into the shoulder, thigh, or buttocks Every 30 (Thirty) Days., Disp: , Rfl: 2  •  Lancet Devices (TRUEDRAW LANCING DEVICE) misc, , Disp: , Rfl: 0  •  levothyroxine (SYNTHROID, LEVOTHROID) 25 MCG tablet, Take 25 mcg by mouth Daily., Disp: , Rfl: 3  •  lisinopril (PRINIVIL,ZESTRIL) 10 MG tablet, Take 10 mg by mouth Daily., Disp: , Rfl: 1  •  metFORMIN (GLUCOPHAGE) 500 MG tablet, Take 250 mg by mouth 2 (Two) Times a Day With Meals., Disp: , Rfl: 3  •  metoprolol tartrate (LOPRESSOR) 100 MG tablet, Take 100 mg by mouth 2 (Two) Times a Day., Disp: , Rfl: 0  •  sodium chloride 1 g tablet, Take 1 g by mouth 2 (Two) Times a Day., Disp: , Rfl:   •  TRUEPLUS LANCETS 33G misc, , Disp: , Rfl: 3  •  ondansetron (ZOFRAN) 4 MG tablet, Take 1 tablet by mouth Every 8 (Eight) Hours As Needed for Nausea or Vomiting., Disp: 30 tablet, Rfl: 1    Allergies   Allergen Reactions   • Penicillins Other (See Comments)     Pt is not sure reaction.       Blood pressure 141/73, pulse 67, temperature 97.3 °F (36.3 °C), resp. rate 20, height 67\" (170.2 cm), weight 163 lb (73.9 " kg).    Physical Exam   Constitutional: She is oriented to person, place, and time. She appears well-developed and well-nourished. No distress.   HENT:   Head: Normocephalic and atraumatic.   Right Ear: Hearing and external ear normal.   Left Ear: Hearing and external ear normal.   Nose: Nose normal.   Mouth/Throat: Oropharynx is clear and moist and mucous membranes are normal. Mucous membranes are not pale, not dry and not cyanotic. No oral lesions. No oropharyngeal exudate.   Eyes: Conjunctivae and EOM are normal. Right eye exhibits no discharge. Left eye exhibits no discharge. No scleral icterus.   Neck: Trachea normal. Neck supple. No JVD present. No edema present. No thyroid mass and no thyromegaly present.   Cardiovascular: Normal rate, regular rhythm, S2 normal and normal heart sounds.  Exam reveals no gallop, no S3 and no friction rub.    No murmur heard.  Pulmonary/Chest: Effort normal and breath sounds normal. No respiratory distress. She has no wheezes. She has no rales. She exhibits no tenderness.   Abdominal: Soft. Normal appearance and bowel sounds are normal. She exhibits no distension, no ascites and no mass. There is no splenomegaly or hepatomegaly. There is no tenderness. There is no rigidity, no rebound and no guarding. No hernia.   Musculoskeletal: She exhibits no tenderness or deformity.       Vascular Status -  Her exam exhibits no right foot edema. Her exam exhibits no left foot edema.  Lymphadenopathy:     She has no cervical adenopathy.        Left: No supraclavicular adenopathy present.   Neurological: She is alert and oriented to person, place, and time. She has normal strength. No cranial nerve deficit or sensory deficit. She exhibits normal muscle tone. Coordination normal.   Skin: No rash noted. She is not diaphoretic. No cyanosis. No pallor. Nails show no clubbing.   Psychiatric: She has a normal mood and affect. Her behavior is normal. Judgment and thought content normal.   Nursing  note and vitals reviewed.    Stigmata of chronic liver disease:  None.  Asterixis:  None.    \  Laboratory Testing:  Upon review of medical records:    Dated April 26, 2017 sodium 125 potassium 3.1 chloride 86 CO2 26 BUN 6 serum creatinine 1.00 glucose 98.  Calcium 9.5.  Albumin 4.60.  T bili 1.1 AST 34 ALT 51 alkaline phosphatase 94.  Lipase 102.  WBC 8.61 hemoglobin 12.2 hematocrit 34.1 MCV 84.8 and platelet count 201.    Dated April 27, 2017 WBC 13.45 hemoglobin 12.9 hematocrit 37.9 MCV 86.7 platelet count 239.    Abdominal imaging:  Upon review of medical records:    Dated April 26, 2017 the patient underwent a CT of the abdomen and pelvis without contrast which revealed: There is a small pericardial effusion and small bilateral pleural effusions with atelectasis, left greater than right.  Small sliding-type hiatal hernia. Liver, gallbladder, spleen, pancreas, adrenal glands appear normal. Nonobstructive bilateral renal stones. There is right renal cortical scarring.  The bowel gas pattern is nonobstructive.  Appendix is not visualized but there are no secondary signs to suggest appendicitis. Moderate stool in the ascending colon.    The bladder is normal in contour. Uterus is atrophic.  There is an isoattenuation mass defined measuring 4 cm consistent with a fibroid. Ovaries are not clearly distinguishable from adjacent unopacified bowel.     Assessment and Plan:      Trang was seen today for abdominal pain, diarrhea and nausea.    Diagnoses and all orders for this visit:    Nausea  Comments:  Etiology unclear.  Differential includes peptic ulcer disease, and pancreatobiliary disease.  Orders:  -     CBC & Differential; Future  -     Comprehensive Metabolic Panel; Future  -     Lipase; Future  -     TSH; Future    Periumbilical abdominal pain  Comments:  Differential diagnosis includes peptic ulcer disease, and pancreatobiliary disease.    Constipation, unspecified constipation type  Comments:  History of  significant constipation.  Orders:  -     CBC & Differential; Future  -     Comprehensive Metabolic Panel; Future  -     Lipase; Future  -     TSH; Future    Diarrhea, unspecified type  Comments:  Recurrent bouts of diarrhea.  Differential includes underlying inflammatory bowel disease.    Weight loss  Comments:  Significant unintentional weight loss.    Hyponatremia  Comments:  History of significant hyponatremia.    Other orders  -     ondansetron (ZOFRAN) 4 MG tablet; Take 1 tablet by mouth Every 8 (Eight) Hours As Needed for Nausea or Vomiting.              Plan  and Patient Instructions:    Patient Instructions     1. Check CBC, CMP, lipase and TSH.  2. Zofran 4 mg tablet.  1-to 3 times a day by mouth as needed for nausea.  3. Upper endoscopy (EGD) counseling:  Description of the procedure, risks, benefits, alternatives and options including nonoperative options were discussed with the patient in detail.  The patient understands and wishes to proceed.  4. Colonoscopy: Description of the procedure, risks benefits alternatives and options including non-operative options were discussed with the patient in detail.  The patient understands and wishes to proceed.  5. Discussed with the patient, and her  in detail.  Findings of previous workup including CT of abdomen and pelvis were told to the patient, and her  including fibroid and kidney stones.  6. The patient should decrease free water intake.  There appears to be an element of compulsive water drinking.  7. The patient is also a candidate for syndrome of inappropriate ADH secretion due to recurrent nausea. Opportunity was given to ask questions.      Note:  Total time spent 60 minutes.  More than half of the time was spent face-to-face discussion.        Servando Rooney MD

## 2017-08-30 PROBLEM — R11.0 NAUSEA: Status: ACTIVE | Noted: 2017-08-30

## 2017-09-22 DIAGNOSIS — R11.0 NAUSEA: Primary | ICD-10-CM

## 2017-09-22 RX ORDER — ONDANSETRON 4 MG/1
TABLET, FILM COATED ORAL
Qty: 30 TABLET | Refills: 0 | Status: SHIPPED | OUTPATIENT
Start: 2017-09-22 | End: 2017-12-27

## 2017-09-28 ENCOUNTER — HOSPITAL ENCOUNTER (OUTPATIENT)
Facility: HOSPITAL | Age: 62
Setting detail: HOSPITAL OUTPATIENT SURGERY
Discharge: HOME | End: 2017-09-28
Attending: INTERNAL MEDICINE | Admitting: INTERNAL MEDICINE

## 2017-09-28 ENCOUNTER — ANESTHESIA (OUTPATIENT)
Dept: GASTROENTEROLOGY | Facility: HOSPITAL | Age: 62
End: 2017-09-28

## 2017-09-28 ENCOUNTER — ANESTHESIA EVENT (OUTPATIENT)
Dept: GASTROENTEROLOGY | Facility: HOSPITAL | Age: 62
End: 2017-09-28

## 2017-09-28 VITALS
HEIGHT: 66 IN | WEIGHT: 164 LBS | BODY MASS INDEX: 26.36 KG/M2 | OXYGEN SATURATION: 98 % | RESPIRATION RATE: 18 BRPM | TEMPERATURE: 98.1 F | HEART RATE: 82 BPM | SYSTOLIC BLOOD PRESSURE: 179 MMHG | DIASTOLIC BLOOD PRESSURE: 91 MMHG

## 2017-09-28 DIAGNOSIS — R11.0 NAUSEA: ICD-10-CM

## 2017-09-28 LAB — GLUCOSE BLDC GLUCOMTR-MCNC: 87 MG/DL (ref 70–130)

## 2017-09-28 PROCEDURE — 82962 GLUCOSE BLOOD TEST: CPT

## 2017-09-28 PROCEDURE — 43239 EGD BIOPSY SINGLE/MULTIPLE: CPT | Performed by: INTERNAL MEDICINE

## 2017-09-28 PROCEDURE — 25010000002 PROPOFOL 10 MG/ML EMULSION: Performed by: NURSE ANESTHETIST, CERTIFIED REGISTERED

## 2017-09-28 PROCEDURE — 88305 TISSUE EXAM BY PATHOLOGIST: CPT | Performed by: INTERNAL MEDICINE

## 2017-09-28 PROCEDURE — 25010000002 METOCLOPRAMIDE PER 10 MG: Performed by: NURSE ANESTHETIST, CERTIFIED REGISTERED

## 2017-09-28 RX ORDER — METOCLOPRAMIDE HYDROCHLORIDE 5 MG/ML
INJECTION INTRAMUSCULAR; INTRAVENOUS AS NEEDED
Status: DISCONTINUED | OUTPATIENT
Start: 2017-09-28 | End: 2017-09-28 | Stop reason: SURG

## 2017-09-28 RX ORDER — SODIUM CHLORIDE 0.9 % (FLUSH) 0.9 %
1-10 SYRINGE (ML) INJECTION AS NEEDED
Status: DISCONTINUED | OUTPATIENT
Start: 2017-09-28 | End: 2017-09-28 | Stop reason: HOSPADM

## 2017-09-28 RX ORDER — PANTOPRAZOLE SODIUM 40 MG/1
40 TABLET, DELAYED RELEASE ORAL
Status: DISCONTINUED | OUTPATIENT
Start: 2017-09-28 | End: 2017-09-28 | Stop reason: HOSPADM

## 2017-09-28 RX ORDER — PANTOPRAZOLE SODIUM 40 MG/1
TABLET, DELAYED RELEASE ORAL
Status: COMPLETED
Start: 2017-09-28 | End: 2017-09-28

## 2017-09-28 RX ORDER — PROPOFOL 10 MG/ML
VIAL (ML) INTRAVENOUS AS NEEDED
Status: DISCONTINUED | OUTPATIENT
Start: 2017-09-28 | End: 2017-09-28 | Stop reason: SURG

## 2017-09-28 RX ORDER — PANTOPRAZOLE SODIUM 40 MG/1
TABLET, DELAYED RELEASE ORAL
Qty: 30 TABLET | Refills: 3 | Status: SHIPPED | OUTPATIENT
Start: 2017-09-28 | End: 2017-11-27 | Stop reason: SDUPTHER

## 2017-09-28 RX ORDER — METOCLOPRAMIDE 5 MG/1
2.5 TABLET ORAL
Qty: 30 TABLET | Refills: 1 | Status: SHIPPED | OUTPATIENT
Start: 2017-09-28 | End: 2017-10-26 | Stop reason: SDUPTHER

## 2017-09-28 RX ORDER — SODIUM CHLORIDE 9 MG/ML
70 INJECTION, SOLUTION INTRAVENOUS CONTINUOUS PRN
Status: DISCONTINUED | OUTPATIENT
Start: 2017-09-28 | End: 2017-09-28 | Stop reason: HOSPADM

## 2017-09-28 RX ADMIN — METOCLOPRAMIDE 5 MG: 5 INJECTION, SOLUTION INTRAMUSCULAR; INTRAVENOUS at 07:47

## 2017-09-28 RX ADMIN — PROPOFOL 50 MG: 10 INJECTION, EMULSION INTRAVENOUS at 07:46

## 2017-09-28 RX ADMIN — PROPOFOL 50 MG: 10 INJECTION, EMULSION INTRAVENOUS at 07:43

## 2017-09-28 RX ADMIN — PANTOPRAZOLE SODIUM 40 MG: 40 TABLET, DELAYED RELEASE ORAL at 08:08

## 2017-09-28 RX ADMIN — PROPOFOL 50 MG: 10 INJECTION, EMULSION INTRAVENOUS at 07:48

## 2017-09-28 RX ADMIN — SODIUM CHLORIDE 70 ML/HR: 9 INJECTION, SOLUTION INTRAVENOUS at 06:35

## 2017-09-28 NOTE — ANESTHESIA POSTPROCEDURE EVALUATION
Patient: Trang Maynard    Procedure Summary     Date Anesthesia Start Anesthesia Stop Room / Location    09/28/17 0740 [id number] [facility] ENDOSCOPY 2 / [facility] ENDOSCOPY       Procedure Diagnosis Surgeon Provider    ESOPHAGOGASTRODUODENOSCOPY WITH BIOPSIES (N/A Esophagus) Esophagitis; Gastric ulcer; Hiatal hernia; Delayed gastric emptying  (Nausea [R11.0]) MD Tobias Gonzalez CRNA          Anesthesia Type: MAC  Last vitals  BP       Temp        Pulse       Resp        SpO2          Post Anesthesia Care and Evaluation    Patient location during evaluation: PACU  Patient participation: complete - patient participated  Level of consciousness: awake and alert  Pain score: 0  Pain management: satisfactory to patient  Airway patency: patent  Anesthetic complications: No anesthetic complications  PONV Status: none  Cardiovascular status: acceptable and hemodynamically stable  Respiratory status: acceptable  Hydration status: acceptable

## 2017-09-28 NOTE — ANESTHESIA PREPROCEDURE EVALUATION
Anesthesia Evaluation     Patient summary reviewed and Nursing notes reviewed   NPO Solid Status: > 8 hours  NPO Liquid Status: > 8 hours     Airway   Mallampati: III  TM distance: >3 FB  Neck ROM: limited  possible difficult intubation and small opening  Dental      Pulmonary    (+) sleep apnea,   Cardiovascular     (+) hypertension, CAD, cardiac stents more than 12 months ago PVD,       Neuro/Psych  (+) seizures (2016 petit mal),    GI/Hepatic/Renal/Endo    (+)  diabetes mellitus poorly controlled,     Musculoskeletal     Abdominal    Substance History      OB/GYN          Other                                        Anesthesia Plan    ASA 3     MAC     intravenous induction   Anesthetic plan and risks discussed with patient.

## 2017-10-03 LAB
LAB AP CASE REPORT: NORMAL
Lab: NORMAL
PATH REPORT.FINAL DX SPEC: NORMAL

## 2017-10-26 RX ORDER — METOCLOPRAMIDE 5 MG/1
TABLET ORAL
Qty: 30 TABLET | Refills: 0 | Status: SHIPPED | OUTPATIENT
Start: 2017-10-26 | End: 2017-12-27

## 2017-11-07 ENCOUNTER — OFFICE VISIT (OUTPATIENT)
Dept: GASTROENTEROLOGY | Facility: CLINIC | Age: 62
End: 2017-11-07

## 2017-11-07 VITALS
TEMPERATURE: 97 F | HEART RATE: 60 BPM | RESPIRATION RATE: 18 BRPM | BODY MASS INDEX: 25.88 KG/M2 | HEIGHT: 66 IN | DIASTOLIC BLOOD PRESSURE: 85 MMHG | SYSTOLIC BLOOD PRESSURE: 192 MMHG | WEIGHT: 161 LBS

## 2017-11-07 DIAGNOSIS — R19.7 DIARRHEA, UNSPECIFIED TYPE: ICD-10-CM

## 2017-11-07 DIAGNOSIS — K30 DELAYED GASTRIC EMPTYING: ICD-10-CM

## 2017-11-07 DIAGNOSIS — R11.0 NAUSEA: ICD-10-CM

## 2017-11-07 DIAGNOSIS — K25.7 CHRONIC GASTRIC ULCER WITHOUT HEMORRHAGE AND WITHOUT PERFORATION: ICD-10-CM

## 2017-11-07 DIAGNOSIS — K59.00 CONSTIPATION, UNSPECIFIED CONSTIPATION TYPE: Primary | ICD-10-CM

## 2017-11-07 PROCEDURE — 99214 OFFICE O/P EST MOD 30 MIN: CPT | Performed by: INTERNAL MEDICINE

## 2017-11-07 RX ORDER — LISINOPRIL 20 MG/1
20 TABLET ORAL 2 TIMES DAILY
COMMUNITY
Start: 2017-09-29

## 2017-11-07 NOTE — PATIENT INSTRUCTIONS
1. Pantoprazole 40 mg 1 by mouth every morning one half hour before breakfast.  2. Avoid NSAIDs.  3. Reglan (metoclopramide) 5 mg tablets.  Take one half tablet (2.5 mg) by mouth in the morning and in the evening (2 times daily preferably half an hour before food).   4. Colonoscopy: Description of the procedure, risks benefits alternatives and options including non-operative options were discussed with the patient in detail.  The patient understands and wishes to proceed.  5. The patient should undergo an upper endoscopy in 2 months or so to insure gastric ulcer healing.  6. Discussed with the patient and her family in detail.  Opportunity was given to ask questions.

## 2017-11-07 NOTE — PROGRESS NOTES
Chief Complaint   Patient presents with   • Follow-up       History of Present Illness     The patient came back for follow visit today.  The patient feels better.  Her epigastric and periumbilical abdominal pain has significantly improved.  Her nausea has also significantly improved.  Now the patient has an occasional nauseous feeling when she takes salt pills.  The patient has been having irregular bowel movements described as constipation followed by episodes of diarrhea.  The patient has a history of constipation off and on for the last 1-2 years. Severity is moderate. This is described as hard stools perhaps one bowel movement twice a week. The patient takes occasional stool softeners and laxatives to have a bowel movement. There is no associated rectal pain.  Episodes of constipation followed by episodes of diarrhea that may last for 1-2 days.  During the diarrheal episodes the patient may have 5-10 bowel movements which are described as watery.  The patient denies taking laxatives or antidiarrheal medication.     There is no history of overt GI bleed (hematemesis melena or hematochezia).  The patient denies nausea or vomiting.  There is no history of reflux.  The patient denies dysphagia or odynophagia.  There is no history of recent significant weight loss.  There is no history of liver or pancreatic disease.    Review of Systems   Constitutional: Negative for appetite change, chills, fatigue, fever and unexpected weight change.   HENT: Negative for mouth sores, nosebleeds and trouble swallowing.    Eyes: Negative for discharge and redness.   Respiratory: Positive for apnea. Negative for cough and shortness of breath.    Cardiovascular: Positive for palpitations. Negative for chest pain and leg swelling.   Gastrointestinal: Positive for abdominal pain and constipation. Negative for abdominal distention, anal bleeding, blood in stool, diarrhea, nausea and vomiting.   Endocrine: Negative for cold intolerance,  heat intolerance and polydipsia.   Genitourinary: Negative for dysuria, hematuria and urgency.   Musculoskeletal: Negative for arthralgias, joint swelling and myalgias.   Skin: Negative for rash.   Allergic/Immunologic: Negative for food allergies and immunocompromised state.   Neurological: Positive for seizures. Negative for dizziness, syncope and headaches.   Hematological: Negative for adenopathy. Bruises/bleeds easily.   Psychiatric/Behavioral: Negative for dysphoric mood. The patient is not nervous/anxious and is not hyperactive.      Patient Active Problem List   Diagnosis   • Intractable cyclical vomiting with nausea   • Uterine fibroid   • Anxiety   • Dyslipidemia   • Tremors of nervous system   • Hyponatremia   • Hypokalemia   • Hypomagnesemia   • Depression   • Constipation   • Essential hypertension   • Nausea     Past Medical History:   Diagnosis Date   • Anxiety    • CAD (coronary artery disease)    • Chest pain     [place] HAS HAD LAST EPISODE ABOUT 3 MONTHS AGO. INTERMITTENT.     • Chronic kidney disease     SEES DR. MIRANDA   • Constipation    • Depression    • Diabetes mellitus    • Heart disease    • Hyperlipidemia    • Hypertension    • Hypokalemia    • Low sodium levels 2017   • Psychosis    • PVD (peripheral vascular disease)    • Schizophrenia    • Seizures 01/2016    ONLY HAD ONE-STATED SIDE EFFECTS OF MEDICATION   • Sleep apnea     NO CPAP   • Thyroid condition    • TIA (transient ischemic attack)    • Tremors of nervous system     Poss. r/t medications pt is on.   • Tremors of nervous system    • Uterine fibroid    • Vitamin B12 deficiency      Past Surgical History:   Procedure Laterality Date   • CARDIAC CATHETERIZATION     • CARDIAC SURGERY      stents 2005, 2007  (4 TOTAL)   • CYST REMOVAL     • ENDOSCOPY N/A 9/28/2017    Procedure: ESOPHAGOGASTRODUODENOSCOPY WITH BIOPSIES;  Surgeon: Servando Rooney MD;  Location: [facility] ENDOSCOPY;  Service:    • TUBAL ABDOMINAL LIGATION    "    Family History   Problem Relation Age of Onset   • Heart disease Mother    • Hypertension Mother    • Alcohol abuse Father      Social History   Substance Use Topics   • Smoking status: Former Smoker     Packs/day: 0.25     Years: 5.00     Types: Cigarettes     Quit date: 9/25/1992   • Smokeless tobacco: Never Used      Comment: quit 1995   • Alcohol use No       Current Outpatient Prescriptions:   •  atorvastatin (LIPITOR) 80 MG tablet, Take 80 mg by mouth Daily., Disp: , Rfl: 5  •  benztropine (COGENTIN) 0.5 MG tablet, Take 0.5 mg by mouth Daily., Disp: , Rfl: 2  •  INVEGA SUSTENNA 156 MG/ML suspension IM injection, Inject 156 mg into the shoulder, thigh, or buttocks Every 30 (Thirty) Days., Disp: , Rfl: 2  •  levothyroxine (SYNTHROID, LEVOTHROID) 25 MCG tablet, Take 25 mcg by mouth Daily., Disp: , Rfl: 3  •  lisinopril (PRINIVIL,ZESTRIL) 20 MG tablet, , Disp: , Rfl:   •  metFORMIN (GLUCOPHAGE) 500 MG tablet, Take 250 mg by mouth 2 (Two) Times a Day With Meals., Disp: , Rfl: 3  •  metoclopramide (REGLAN) 5 MG tablet, TAKE 1/2 TABLET BY MOUTH TWO TIMES A DAY BEFORE MEALS, Disp: 30 tablet, Rfl: 0  •  metoprolol tartrate (LOPRESSOR) 100 MG tablet, Take 100 mg by mouth 2 (Two) Times a Day., Disp: , Rfl: 0  •  ondansetron (ZOFRAN) 4 MG tablet, TAKE ONE TABLET BY MOUTH EVERY 8 HOURS AS NEEDED FOR NAUSEA OR VOMITING., Disp: 30 tablet, Rfl: 0  •  pantoprazole (PROTONIX) 40 MG EC tablet, Take 1 tablet by mouth 30 minutes before breakfast daily., Disp: 30 tablet, Rfl: 3  •  sodium chloride 1 g tablet, Take 1 g by mouth 2 (Two) Times a Day., Disp: , Rfl:     Allergies   Allergen Reactions   • Penicillins Other (See Comments)     Pt is not sure reaction.       Blood pressure (!) 192/85, pulse 60, temperature 97 °F (36.1 °C), resp. rate 18, height 66\" (167.6 cm), weight 161 lb (73 kg).    Physical Exam   Constitutional: She is oriented to person, place, and time. She appears well-developed and well-nourished. No distress. "   HENT:   Head: Normocephalic and atraumatic.   Right Ear: Hearing and external ear normal.   Left Ear: Hearing and external ear normal.   Nose: Nose normal.   Mouth/Throat: Oropharynx is clear and moist and mucous membranes are normal. Mucous membranes are not pale, not dry and not cyanotic. No oral lesions. No oropharyngeal exudate.   Eyes: Conjunctivae and EOM are normal. Right eye exhibits no discharge. Left eye exhibits no discharge. No scleral icterus.   Neck: Trachea normal. Neck supple. No JVD present. No edema present. No thyroid mass and no thyromegaly present.   Cardiovascular: Normal rate, regular rhythm, S2 normal and normal heart sounds.  Exam reveals no gallop, no S3 and no friction rub.    No murmur heard.  Pulmonary/Chest: Effort normal and breath sounds normal. No respiratory distress. She has no wheezes. She has no rales. She exhibits no tenderness.   Abdominal: Soft. Normal appearance and bowel sounds are normal. She exhibits no distension, no ascites and no mass. There is no splenomegaly or hepatomegaly. There is no tenderness. There is no rigidity, no rebound and no guarding. No hernia.   Musculoskeletal: She exhibits no tenderness or deformity.       Vascular Status -  Her exam exhibits no right foot edema. Her exam exhibits no left foot edema.  Lymphadenopathy:     She has no cervical adenopathy.        Left: No supraclavicular adenopathy present.   Neurological: She is alert and oriented to person, place, and time. She has normal strength. No cranial nerve deficit or sensory deficit. She exhibits normal muscle tone. Coordination normal.   Skin: No rash noted. She is not diaphoretic. No cyanosis. No pallor. Nails show no clubbing.   Psychiatric: She has a normal mood and affect. Her behavior is normal. Judgment and thought content normal.   Nursing note and vitals reviewed.    Stigmata of chronic liver disease:  None.  Asterixis:  None.      Laboratory Testing:  Upon review of medical  records:    Dated April 26, 2017 sodium 125 potassium 3.1 chloride 86 CO2 26 BUN 6 serum creatinine 1.00 glucose 98.  Calcium 9.5.  Albumin 4.60.  T bili 1.1 AST 34 ALT 51 alkaline phosphatase 94.  Lipase 102.  WBC 8.61 hemoglobin 12.2 hematocrit 34.1 MCV 84.8 and platelet count 201.    Dated April 27, 2017 WBC 13.45 hemoglobin 12.9 hematocrit 37.9 MCV 86.7 platelet count 239.    Dated August 29, 2017 sodium 142 potassium 3.7 chloride 109 CO2 22 BUN 7 serum creatinine 0.90 glucose 67.  Calcium on 0.5.  Albumin 4.10.  T bili 0.5 AST 25 ALT 34 alkaline phosphatase 85. Total protein 7.1.  Lipase 58.  TSH 2.340.  WBC 9.67 hemoglobin 11.6 hematocrit 35.9 MCV 95.2 platelet count 228.    Abdominal imaging:  Upon review of medical records:    Dated April 26, 2017 the patient underwent a CT of the abdomen and pelvis without contrast which revealed: There is a small pericardial effusion and small bilateral pleural effusions with atelectasis, left greater than right.  Small sliding-type hiatal hernia. Liver, gallbladder, spleen, pancreas, adrenal glands appear normal. Nonobstructive bilateral renal stones. There is right renal cortical scarring.  The bowel gas pattern is nonobstructive.  Appendix is not visualized but there are no secondary signs to suggest appendicitis. Moderate stool in the ascending colon.    The bladder is normal in contour. Uterus is atrophic.  There is an isoattenuation mass defined measuring 4 cm consistent with a fibroid. Ovaries are not clearly distinguishable from adjacent unopacified bowel.    Procedures:  Upon review of medical records:     Dated September 28, 2017 the patient underwent an upper endoscopy which revealed: Erythematous distal esophagitis.  1.25 cm clean-based gastric ulcer at the greater curvature.  Erythematous gastritis.  Changes consistent with delayed gastric emptying (partly digested food residue within the stomach, patient nothing by mouth for 10 hours, no gastric outlet  obstruction).  Small sliding hiatal hernia less than 3 cm.  Gastric antrum, biopsy revealed reactive gastropathy with minimal chronic gastritis and congestion.  Negative for H. pylori, metaplasia, dysplasia or malignancy.  Gastric, greater curve ulcer area, biopsies revealed mucosal congestion with reactive/hyperplastic foveolar glands, consistent with an adjacent ulcer.  Focal necro-inflammatory ulcer bed debris.  Negative for H. pylori, metaplasia, dysplasia or malignancy.      Assessment and Plan:      Trang was seen today for follow-up.    Diagnoses and all orders for this visit:    Constipation, unspecified constipation type  Comments:  Likely multifactorial.    Diarrhea, unspecified type  Comments:  History of irregular bowel habits.  With episodes of diarrhea.    Chronic gastric ulcer without hemorrhage and without perforation  Comments:  Clinically improved.  Helicobacter pylori negative.    Delayed gastric emptying  Comments:  There is an element of delayed gastric emptying.    Nausea  Comments:  Likely secondary to delayed gastric emptying and peptic ulcer disease.  Improved.              Plan  and Patient Instructions:    Patient Instructions     1. Pantoprazole 40 mg 1 by mouth every morning one half hour before breakfast.  2. Avoid NSAIDs.  3. Reglan (metoclopramide) 5 mg tablets.  Take one half tablet (2.5 mg) by mouth in the morning and in the evening (2 times daily preferably half an hour before food).   4. Colonoscopy: Description of the procedure, risks benefits alternatives and options including non-operative options were discussed with the patient in detail.  The patient understands and wishes to proceed.  5. The patient should undergo an upper endoscopy in 2 months or so to insure gastric ulcer healing.  6. Discussed with the patient and her family in detail.  Opportunity was given to ask questions.          Servando Rooney MD

## 2017-11-08 ENCOUNTER — PREP FOR SURGERY (OUTPATIENT)
Dept: OTHER | Facility: HOSPITAL | Age: 62
End: 2017-11-08

## 2017-11-08 DIAGNOSIS — K25.9 GASTRIC ULCER: Primary | ICD-10-CM

## 2017-11-08 DIAGNOSIS — K30 DELAYED GASTRIC EMPTYING: ICD-10-CM

## 2017-11-08 DIAGNOSIS — Z12.11 COLON CANCER SCREENING: Primary | ICD-10-CM

## 2017-11-08 DIAGNOSIS — R11.0 NAUSEA: ICD-10-CM

## 2017-11-08 DIAGNOSIS — K59.00 CONSTIPATION: ICD-10-CM

## 2017-11-08 DIAGNOSIS — R19.7 DIARRHEA: ICD-10-CM

## 2017-11-27 DIAGNOSIS — R12 HEARTBURN: Primary | ICD-10-CM

## 2017-11-27 RX ORDER — PANTOPRAZOLE SODIUM 40 MG/1
TABLET, DELAYED RELEASE ORAL
Qty: 30 TABLET | Refills: 6 | Status: SHIPPED | OUTPATIENT
Start: 2017-11-27 | End: 2018-01-05 | Stop reason: SDUPTHER

## 2017-11-30 RX ORDER — SODIUM CHLORIDE 9 MG/ML
70 INJECTION, SOLUTION INTRAVENOUS CONTINUOUS PRN
Status: CANCELLED | OUTPATIENT
Start: 2017-11-30

## 2017-12-01 PROBLEM — K25.9 GASTRIC ULCER: Status: ACTIVE | Noted: 2017-12-01

## 2017-12-01 PROBLEM — R19.7 DIARRHEA: Status: ACTIVE | Noted: 2017-12-01

## 2017-12-01 PROBLEM — K30 DELAYED GASTRIC EMPTYING: Status: ACTIVE | Noted: 2017-12-01

## 2017-12-01 PROBLEM — Z12.11 COLON CANCER SCREENING: Status: ACTIVE | Noted: 2017-12-01

## 2017-12-27 VITALS — BODY MASS INDEX: 25.63 KG/M2 | WEIGHT: 159.5 LBS | HEIGHT: 66 IN

## 2017-12-27 RX ORDER — ASPIRIN 325 MG
325 TABLET ORAL DAILY
COMMUNITY
End: 2018-05-29

## 2018-01-04 ENCOUNTER — HOSPITAL ENCOUNTER (OUTPATIENT)
Facility: HOSPITAL | Age: 63
Setting detail: HOSPITAL OUTPATIENT SURGERY
Discharge: HOME | End: 2018-01-04
Attending: INTERNAL MEDICINE | Admitting: INTERNAL MEDICINE

## 2018-01-04 DIAGNOSIS — Z12.11 COLON CANCER SCREENING: ICD-10-CM

## 2018-01-04 DIAGNOSIS — K59.00 CONSTIPATION: ICD-10-CM

## 2018-01-04 DIAGNOSIS — R19.7 DIARRHEA: ICD-10-CM

## 2018-01-04 PROCEDURE — G0463 HOSPITAL OUTPT CLINIC VISIT: HCPCS | Performed by: INTERNAL MEDICINE

## 2018-01-04 RX ORDER — SODIUM CHLORIDE 9 MG/ML
70 INJECTION, SOLUTION INTRAVENOUS CONTINUOUS PRN
Status: DISCONTINUED | OUTPATIENT
Start: 2018-01-04 | End: 2018-01-04 | Stop reason: HOSPADM

## 2018-01-04 RX ORDER — SODIUM CHLORIDE 0.9 % (FLUSH) 0.9 %
3 SYRINGE (ML) INJECTION AS NEEDED
Status: DISCONTINUED | OUTPATIENT
Start: 2018-01-04 | End: 2018-01-04 | Stop reason: HOSPADM

## 2018-01-04 NOTE — PLAN OF CARE
Problem: GI Endoscopy (Adult)  Goal: Signs and Symptoms of Listed Potential Problems Will be Absent or Manageable (GI Endoscopy)  Outcome: Ongoing (interventions implemented as appropriate)   01/04/18 0755   GI Endoscopy   Problems Assessed (GI Endoscopy) all   Problems Present (GI Endoscopy) none

## 2018-01-05 DIAGNOSIS — R12 HEARTBURN: ICD-10-CM

## 2018-01-05 RX ORDER — PANTOPRAZOLE SODIUM 40 MG/1
40 TABLET, DELAYED RELEASE ORAL DAILY
Qty: 30 TABLET | Refills: 6 | Status: SHIPPED | OUTPATIENT
Start: 2018-01-05 | End: 2018-07-24 | Stop reason: SDUPTHER

## 2018-01-24 ENCOUNTER — APPOINTMENT (OUTPATIENT)
Dept: LAB | Facility: HOSPITAL | Age: 63
End: 2018-01-24

## 2018-01-24 ENCOUNTER — TRANSCRIBE ORDERS (OUTPATIENT)
Dept: ADMINISTRATIVE | Facility: HOSPITAL | Age: 63
End: 2018-01-24

## 2018-01-24 ENCOUNTER — ANESTHESIA EVENT (OUTPATIENT)
Dept: GASTROENTEROLOGY | Facility: HOSPITAL | Age: 63
End: 2018-01-24

## 2018-01-24 DIAGNOSIS — E87.1 HYPONATREMIA: Primary | ICD-10-CM

## 2018-01-24 LAB
ALBUMIN SERPL-MCNC: 4 G/DL (ref 3.5–5)
ANION GAP SERPL CALCULATED.3IONS-SCNC: 12.9 MMOL/L
BUN BLD-MCNC: 9 MG/DL (ref 7–20)
BUN/CREAT SERPL: 10 (ref 7.1–23.5)
CALCIUM SPEC-SCNC: 9.7 MG/DL (ref 8.4–10.2)
CHLORIDE SERPL-SCNC: 104 MMOL/L (ref 98–107)
CO2 SERPL-SCNC: 29 MMOL/L (ref 26–30)
CREAT BLD-MCNC: 0.9 MG/DL (ref 0.6–1.3)
GFR SERPL CREATININE-BSD FRML MDRD: 63 ML/MIN/1.73
GLUCOSE BLD-MCNC: 97 MG/DL (ref 74–98)
PHOSPHATE SERPL-MCNC: 3.2 MG/DL (ref 2.5–4.5)
POTASSIUM BLD-SCNC: 3.9 MMOL/L (ref 3.5–5.1)
SODIUM BLD-SCNC: 142 MMOL/L (ref 137–145)

## 2018-01-24 PROCEDURE — 80069 RENAL FUNCTION PANEL: CPT | Performed by: INTERNAL MEDICINE

## 2018-01-24 PROCEDURE — 36415 COLL VENOUS BLD VENIPUNCTURE: CPT | Performed by: INTERNAL MEDICINE

## 2018-01-24 NOTE — ANESTHESIA PREPROCEDURE EVALUATION
Anesthesia Evaluation     Patient summary reviewed and Nursing notes reviewed   no history of anesthetic complications:  NPO Solid Status: > 8 hours  NPO Liquid Status: > 8 hours     Airway   Mallampati: III  TM distance: >3 FB  Neck ROM: limited  possible difficult intubation  Dental - normal exam     Pulmonary - normal exam    breath sounds clear to auscultation  (+) a smoker Former, sleep apnea (Could not tolerate CPAP),   Cardiovascular - normal exam  Exercise tolerance: good (4-7 METS)    Patient on routine beta blocker and Beta blocker given within 24 hours of surgery  Rhythm: regular  Rate: normal    (+) hypertension, CAD, cardiac stents more than 12 months ago PVD, hyperlipidemia      Neuro/Psych  (+) seizures (2016 petit mal), TIA, tremors, psychiatric history Anxiety and Depression,     GI/Hepatic/Renal/Endo    (+)  GERD, PUD, renal disease stones, diabetes mellitus type 2 poorly controlled,     Musculoskeletal     Abdominal    Substance History      OB/GYN          Other        ROS/Med Hx Other: fsbs 92                                      Anesthesia Plan    ASA 3     MAC   (Pt told that intravenous sedation will be used as the primary anesthetic. Every effort will be made to make sure the patient is comfortable.     The patient was told they may or may not have recall for the procedure.  Will proceed with the plan of care.)  intravenous induction   Anesthetic plan and risks discussed with patient.

## 2018-01-25 ENCOUNTER — ANESTHESIA (OUTPATIENT)
Dept: GASTROENTEROLOGY | Facility: HOSPITAL | Age: 63
End: 2018-01-25

## 2018-01-25 ENCOUNTER — HOSPITAL ENCOUNTER (OUTPATIENT)
Facility: HOSPITAL | Age: 63
Setting detail: HOSPITAL OUTPATIENT SURGERY
Discharge: HOME | End: 2018-01-25
Attending: INTERNAL MEDICINE | Admitting: INTERNAL MEDICINE

## 2018-01-25 VITALS
BODY MASS INDEX: 25.55 KG/M2 | DIASTOLIC BLOOD PRESSURE: 104 MMHG | HEIGHT: 66 IN | HEART RATE: 67 BPM | SYSTOLIC BLOOD PRESSURE: 190 MMHG | OXYGEN SATURATION: 97 % | TEMPERATURE: 96.9 F | WEIGHT: 159 LBS | RESPIRATION RATE: 18 BRPM

## 2018-01-25 DIAGNOSIS — R11.0 NAUSEA: ICD-10-CM

## 2018-01-25 DIAGNOSIS — K25.9 GASTRIC ULCER: ICD-10-CM

## 2018-01-25 DIAGNOSIS — K30 DELAYED GASTRIC EMPTYING: ICD-10-CM

## 2018-01-25 LAB — GLUCOSE BLDC GLUCOMTR-MCNC: 92 MG/DL (ref 70–130)

## 2018-01-25 PROCEDURE — 25010000002 PROPOFOL 200 MG/20ML EMULSION: Performed by: NURSE ANESTHETIST, CERTIFIED REGISTERED

## 2018-01-25 PROCEDURE — S0260 H&P FOR SURGERY: HCPCS | Performed by: INTERNAL MEDICINE

## 2018-01-25 PROCEDURE — 88342 IMHCHEM/IMCYTCHM 1ST ANTB: CPT | Performed by: INTERNAL MEDICINE

## 2018-01-25 PROCEDURE — 88305 TISSUE EXAM BY PATHOLOGIST: CPT | Performed by: INTERNAL MEDICINE

## 2018-01-25 PROCEDURE — 25010000002 MIDAZOLAM PER 1 MG: Performed by: NURSE ANESTHETIST, CERTIFIED REGISTERED

## 2018-01-25 PROCEDURE — 82941 ASSAY OF GASTRIN: CPT | Performed by: INTERNAL MEDICINE

## 2018-01-25 PROCEDURE — 43239 EGD BIOPSY SINGLE/MULTIPLE: CPT | Performed by: INTERNAL MEDICINE

## 2018-01-25 PROCEDURE — 82962 GLUCOSE BLOOD TEST: CPT

## 2018-01-25 RX ORDER — PANTOPRAZOLE SODIUM 40 MG/1
40 TABLET, DELAYED RELEASE ORAL
Status: DISCONTINUED | OUTPATIENT
Start: 2018-01-25 | End: 2018-01-25 | Stop reason: HOSPADM

## 2018-01-25 RX ORDER — SODIUM CHLORIDE 0.9 % (FLUSH) 0.9 %
3 SYRINGE (ML) INJECTION AS NEEDED
Status: DISCONTINUED | OUTPATIENT
Start: 2018-01-25 | End: 2018-01-25 | Stop reason: HOSPADM

## 2018-01-25 RX ORDER — MIDAZOLAM HYDROCHLORIDE 1 MG/ML
INJECTION INTRAMUSCULAR; INTRAVENOUS AS NEEDED
Status: DISCONTINUED | OUTPATIENT
Start: 2018-01-25 | End: 2018-01-25 | Stop reason: SURG

## 2018-01-25 RX ORDER — SODIUM CHLORIDE 9 MG/ML
70 INJECTION, SOLUTION INTRAVENOUS CONTINUOUS PRN
Status: DISCONTINUED | OUTPATIENT
Start: 2018-01-25 | End: 2018-01-25 | Stop reason: HOSPADM

## 2018-01-25 RX ORDER — PANTOPRAZOLE SODIUM 40 MG/1
TABLET, DELAYED RELEASE ORAL
Status: COMPLETED
Start: 2018-01-25 | End: 2018-01-25

## 2018-01-25 RX ORDER — LABETALOL HYDROCHLORIDE 5 MG/ML
INJECTION, SOLUTION INTRAVENOUS AS NEEDED
Status: DISCONTINUED | OUTPATIENT
Start: 2018-01-25 | End: 2018-01-25 | Stop reason: SURG

## 2018-01-25 RX ORDER — PROPOFOL 10 MG/ML
INJECTION, EMULSION INTRAVENOUS AS NEEDED
Status: DISCONTINUED | OUTPATIENT
Start: 2018-01-25 | End: 2018-01-25 | Stop reason: SURG

## 2018-01-25 RX ADMIN — PANTOPRAZOLE SODIUM 40 MG: 40 TABLET, DELAYED RELEASE ORAL at 08:49

## 2018-01-25 RX ADMIN — MIDAZOLAM HYDROCHLORIDE 1 MG: 1 INJECTION, SOLUTION INTRAMUSCULAR; INTRAVENOUS at 08:05

## 2018-01-25 RX ADMIN — PROPOFOL 50 MG: 10 INJECTION, EMULSION INTRAVENOUS at 08:06

## 2018-01-25 RX ADMIN — PROPOFOL 50 MG: 10 INJECTION, EMULSION INTRAVENOUS at 08:13

## 2018-01-25 RX ADMIN — LABETALOL HYDROCHLORIDE 5 MG: 5 INJECTION, SOLUTION INTRAVENOUS at 08:14

## 2018-01-25 RX ADMIN — SODIUM CHLORIDE: 9 INJECTION, SOLUTION INTRAVENOUS at 07:51

## 2018-01-25 RX ADMIN — LABETALOL HYDROCHLORIDE 10 MG: 5 INJECTION, SOLUTION INTRAVENOUS at 08:10

## 2018-01-25 RX ADMIN — SODIUM CHLORIDE 70 ML/HR: 9 INJECTION, SOLUTION INTRAVENOUS at 08:32

## 2018-01-25 NOTE — ANESTHESIA POSTPROCEDURE EVALUATION
"Patient: Trang Maynard    Procedure Summary     Date Anesthesia Start Anesthesia Stop Room / Location    01/25/18 0804 0821 [facility] ENDOSCOPY 2 / [facility] ENDOSCOPY       Procedure Diagnosis Surgeon Provider    ESOPHAGOGASTRODUODENOSCOPY with biopsies (N/A Esophagus) Gastric ulcer; Gastritis; Hiatal hernia  (Gastric ulcer [K25.9]; Nausea [R11.0]; Delayed gastric emptying [K30]) MD Wilfrido Gonzalez CRNA          Anesthesia Type: MAC  Last vitals  BP (!) 190/104 (BP Location: Left arm, Patient Position: Sitting)  Pulse 67  Temp 96.9 °F (36.1 °C) (Temporal Artery )   Resp 18  Ht 167.6 cm (66\")  Wt 72.1 kg (159 lb)  LMP  (LMP Unknown) Comment: postmenopausal  SpO2 97%  BMI 25.66 kg/m2    BP   (!) 190/104 (01/25/18 0900)   Temp   96.9 °F (36.1 °C) (01/25/18 0830)   Pulse   67 (01/25/18 0900)   Resp   18 (01/25/18 0900)     SpO2   97 % (01/25/18 0900)     Post Anesthesia Care and Evaluation    Patient location during evaluation: bedside  Patient participation: complete - patient participated  Level of consciousness: awake and awake and alert  Pain score: 0  Pain management: satisfactory to patient  Airway patency: patent  Anesthetic complications: No anesthetic complications  PONV Status: none  Cardiovascular status: acceptable and stable  Respiratory status: acceptable, spontaneous ventilation, room air and nonlabored ventilation  Hydration status: acceptable      "

## 2018-01-28 LAB — GASTRIN SERPL-MCNC: 398 PG/ML (ref 0–115)

## 2018-01-30 LAB
LAB AP CASE REPORT: NORMAL
Lab: NORMAL
PATH REPORT.FINAL DX SPEC: NORMAL

## 2018-04-09 ENCOUNTER — OFFICE VISIT (OUTPATIENT)
Dept: GASTROENTEROLOGY | Facility: CLINIC | Age: 63
End: 2018-04-09

## 2018-04-09 ENCOUNTER — PREP FOR SURGERY (OUTPATIENT)
Dept: OTHER | Facility: HOSPITAL | Age: 63
End: 2018-04-09

## 2018-04-09 VITALS
SYSTOLIC BLOOD PRESSURE: 154 MMHG | RESPIRATION RATE: 18 BRPM | HEART RATE: 65 BPM | DIASTOLIC BLOOD PRESSURE: 72 MMHG | BODY MASS INDEX: 26.36 KG/M2 | HEIGHT: 66 IN | TEMPERATURE: 97 F | WEIGHT: 164 LBS

## 2018-04-09 DIAGNOSIS — R10.13 EPIGASTRIC PAIN: ICD-10-CM

## 2018-04-09 DIAGNOSIS — K59.00 CONSTIPATION, UNSPECIFIED CONSTIPATION TYPE: ICD-10-CM

## 2018-04-09 DIAGNOSIS — Z12.11 COLON CANCER SCREENING: ICD-10-CM

## 2018-04-09 DIAGNOSIS — R10.32 LEFT LOWER QUADRANT PAIN: ICD-10-CM

## 2018-04-09 DIAGNOSIS — E16.4 ELEVATED GASTRIN LEVEL: ICD-10-CM

## 2018-04-09 DIAGNOSIS — K59.00 CONSTIPATION: Primary | ICD-10-CM

## 2018-04-09 DIAGNOSIS — R19.7 DIARRHEA, UNSPECIFIED TYPE: ICD-10-CM

## 2018-04-09 DIAGNOSIS — R10.32 LEFT LOWER QUADRANT PAIN: Primary | ICD-10-CM

## 2018-04-09 DIAGNOSIS — R11.0 NAUSEA: ICD-10-CM

## 2018-04-09 DIAGNOSIS — K25.7 CHRONIC GASTRIC ULCER WITHOUT HEMORRHAGE AND WITHOUT PERFORATION: ICD-10-CM

## 2018-04-09 PROCEDURE — 99214 OFFICE O/P EST MOD 30 MIN: CPT | Performed by: INTERNAL MEDICINE

## 2018-04-09 RX ORDER — CIPROFLOXACIN 500 MG/1
TABLET, FILM COATED ORAL
Qty: 14 TABLET | Refills: 0 | Status: SHIPPED | OUTPATIENT
Start: 2018-04-09 | End: 2018-05-29

## 2018-04-09 RX ORDER — ALUMINUM ZIRCONIUM OCTACHLOROHYDREX GLY 16 G/100G
1 GEL TOPICAL DAILY
Qty: 14 CAPSULE | Refills: 0 | COMMUNITY
Start: 2018-04-09 | End: 2018-05-29

## 2018-04-09 RX ORDER — METRONIDAZOLE 250 MG/1
TABLET ORAL
Qty: 28 TABLET | Refills: 0 | Status: SHIPPED | OUTPATIENT
Start: 2018-04-09 | End: 2018-05-29

## 2018-04-09 NOTE — PATIENT INSTRUCTIONS
"Low fiber diet (avoid foods, vegetables, cereals, fiber supplements, and seeds) for 5 days thereafter low-fat high-fiber diet.  Cipro (ciprofloxacin) tablets 500 mg. Take 1 tablet by mouth twice a day for 7 days. Side effects were discussed.  Flagyl (metronidazole) tablets 250 mg. Take 1 tablet one by mouth 4 times a day for 7 days.  Side effects were discussed.  Avoid laxatives, enemas for next 5 days.  However, for constipation the patient may use \"stool softeners\" 1-3 per day.  1. May take probiotics such as Align.  Take one orally daily while taking antibiotics and 1-2 weeks thereafter.  2. Colonoscopy: Description of the procedure, risks benefits alternatives and options including non-operative options were discussed with the patient in detail.  The patient understands and wishes to proceed.  This may however be postponed for about 3-4 weeks. The patient was supposed to undergo a colonoscopy about couple of months ago. However unfortunately since the patient was having formed stools this was canceled. The patient never had a colonoscopy in the past.  3. Check fasting serum gastrin level.  4. Avoid other NSAIDs. Currently the patient takes one aspirin a day.  5. The patient should take thyroid medication first thing in the morning on an empty stomach, wait for half hour, then take pantoprazole, wait for half hour and then eat.  6. Protonix (Pantoprazole) tablet 40 mg tablet. Take 1 tablet orally in the morning half an hour before eating every day.  7. Discussed with the patient and her  in detail. Opportunity was given to ask questions.  8. The patient should undergo an upper endoscopy to ensure gastric ulcer healing in the near future.  9. The patient will need further evaluation-clarification of elevated serum gastrin level (while on pantoprazole).  "

## 2018-04-09 NOTE — PROGRESS NOTES
Chief Complaint   Patient presents with   • Abdominal Pain     History of Present Illness     There is history of left lower quadrant abdominal pain off and on for the last 1-2 weeks.  The pain is gradual in onset, moderate in severity and aching in character.  Frequency being 2-3 times a week.  The pain may last for several minutes.  There is no radiation of abdominal pain.  Eating worsens the abdominal pain.  No definite relieving factors of abdominal pain. There is no history of fever or chills.    The patient had some diarrhea last week perhaps 2-3 times a day for couple of days. This has resolved. Now the patient is rather constipated. She did not have a bowel movement over the last few days. Her epigastric abdominal pain has improved. The patient feels better in terms of nausea. She denies reflux. There is no dysphagia or odynophagia. There is no history of recent weight loss. There is no history of overt GI bleed (Hematemesis, melena or hematochezia).    The patient has history of gastric ulcer. She is negative for Helicobacter pylori. The patient had undergone an upper endoscopy in January 2018 to ensure gastric ulcer healing. She was found to have 1.25 cm gastric ulcer on the greater curvature with brownish blackish eschar. Biopsies from the ulcer edges did not reveal neoplastic change. Helicobacter pylori negative. Of interest that the patient was taking aspirin, and ibuprofen. The patient has been off ibuprofen. She however takes aspirin due to her heart. Her epigastric abdominal pain has significantly improved. There is no history of liver or pancreatic disease.   Of interest that the patient has history of irregular bowel habits described as diarrhea and at times constipation. The patient was supposed to undergo a colonoscopy about couple of months ago. However this was canceled since the patient was having formed stools. There is no previous history of colonoscopy. She denies family history of colon  cancer, inflammatory bowel disease or chronic liver disease.      Review of Systems   Constitutional: Negative for appetite change, chills, fatigue, fever and unexpected weight change.   HENT: Negative for mouth sores, nosebleeds and trouble swallowing.    Eyes: Negative for discharge and redness.   Respiratory: Positive for apnea. Negative for cough and shortness of breath.    Cardiovascular: Positive for palpitations. Negative for chest pain and leg swelling.   Gastrointestinal: Positive for abdominal pain, constipation and diarrhea. Negative for abdominal distention, anal bleeding, blood in stool, nausea and vomiting.   Endocrine: Negative for cold intolerance, heat intolerance and polydipsia.   Genitourinary: Negative for dysuria, hematuria and urgency.   Musculoskeletal: Negative for arthralgias, joint swelling and myalgias.   Skin: Negative for rash.   Allergic/Immunologic: Negative for food allergies and immunocompromised state.   Neurological: Positive for seizures. Negative for dizziness, syncope and headaches.   Hematological: Negative for adenopathy. Bruises/bleeds easily.   Psychiatric/Behavioral: Negative for dysphoric mood. The patient is not nervous/anxious and is not hyperactive.      Patient Active Problem List   Diagnosis   • Intractable cyclical vomiting with nausea   • Uterine fibroid   • Anxiety   • Dyslipidemia   • Tremors of nervous system   • Hyponatremia   • Hypokalemia   • Hypomagnesemia   • Depression   • Constipation   • Essential hypertension   • Nausea   • Diarrhea   • Colon cancer screening   • Gastric ulcer   • Delayed gastric emptying     Past Medical History:   Diagnosis Date   • Anxiety    • Arthritis    • CAD (coronary artery disease)    • Chest pain     SPOUSE STATES HAS HAD LAST EPISODE ABOUT 3 MONTHS AGO. INTERMITTENT.     • Chronic kidney disease     SEES DR. MIRANDA   • Constipation    • Constipation    • Depression    • Diabetes mellitus    • Diarrhea    • Dysphagia      "SPOUSE REPORTS VERY MILD.   • Heart disease    • Hyperlipidemia    • Hypertension    • Hypokalemia    • Kidney stone    • Low sodium levels 2017   • Psychosis    • PVD (peripheral vascular disease)    • Schizophrenia    • Seizures 01/2016    ONLY HAD ONE EPISODE.  STATED SIDE EFFECTS OF MEDICATION   • Sleep apnea     NO CPAP   • Sleep apnea     SPOUSE REPORTS PATIENT WAS NOT ABLE TO TOLERATE THE CPAP.     • Thyroid condition    • TIA (transient ischemic attack)     2010.  SPOUSE REPORTS NO RESIDUAL PROBLEMS.   • Tremors of nervous system     Poss. r/t medications pt is on.   • Tremors of nervous system    • Uterine fibroid    • Vitamin B12 deficiency      Past Surgical History:   Procedure Laterality Date   • CARDIAC CATHETERIZATION     • CARDIAC SURGERY      SPOUSE REPORTS 2 STENTS PLACED IN 2005 AND 2 STENTS IN 2007   • CYST REMOVAL      SPOUSE REPORTS REMOVED FROM NECK   • ENDOSCOPY N/A 9/28/2017    Procedure: ESOPHAGOGASTRODUODENOSCOPY WITH BIOPSIES;  Surgeon: Servando Rooney MD;  Location: [facility] ENDOSCOPY;  Service:    • ENDOSCOPY N/A 1/25/2018    Procedure: ESOPHAGOGASTRODUODENOSCOPY with biopsies;  Surgeon: Servando Rooney MD;  Location: [facility] ENDOSCOPY;  Service:    • TUBAL ABDOMINAL LIGATION       Family History   Problem Relation Age of Onset   • Heart disease Mother    • Hypertension Mother    • Alcohol abuse Father      Social History   Substance Use Topics   • Smoking status: Former Smoker     Packs/day: 0.25     Years: 5.00     Types: Cigarettes     Quit date: 9/25/1992   • Smokeless tobacco: Never Used      Comment: SPOUSE REPORTS \"SHE DIDN'T INHALE\"   • Alcohol use No       Current Outpatient Prescriptions:   •  aspirin 325 MG tablet, Take 325 mg by mouth Daily., Disp: , Rfl:   •  atorvastatin (LIPITOR) 80 MG tablet, Take 80 mg by mouth Daily., Disp: , Rfl: 5  •  benztropine (COGENTIN) 0.5 MG tablet, Take 0.5 mg by mouth Daily., Disp: , Rfl: 2  •  INVEGA SUSTENNA 156 MG/ML suspension IM injection, " "Inject 156 mg into the shoulder, thigh, or buttocks Every 30 (Thirty) Days., Disp: , Rfl: 2  •  levothyroxine (SYNTHROID, LEVOTHROID) 25 MCG tablet, Take 25 mcg by mouth Daily., Disp: , Rfl: 3  •  lisinopril (PRINIVIL,ZESTRIL) 20 MG tablet, Take 20 mg by mouth Daily., Disp: , Rfl:   •  metFORMIN (GLUCOPHAGE) 500 MG tablet, Take 250 mg by mouth 2 (Two) Times a Day With Meals. SPOUSE REPORTS PATIENT TAKES ONE HALF OF A 500MG TABLET, TWICE DAILY, Disp: , Rfl: 3  •  metoprolol tartrate (LOPRESSOR) 100 MG tablet, Take 100 mg by mouth 2 (Two) Times a Day., Disp: , Rfl: 0  •  pantoprazole (PROTONIX) 40 MG EC tablet, Take 1 tablet by mouth Daily. Half an hour before breakfast, Disp: 30 tablet, Rfl: 6  •  ciprofloxacin (CIPRO) 500 MG tablet, Take 1 tablet by mouth twice a day x 7 days, Disp: 14 tablet, Rfl: 0  •  metroNIDAZOLE (FLAGYL) 250 MG tablet, Take 1 tablet by mouth four times a day x 7 days, Disp: 28 tablet, Rfl: 0  •  sodium chloride 1 g tablet, Take 1 g by mouth Daily., Disp: , Rfl:     Allergies   Allergen Reactions   • Penicillins Other (See Comments)     Pt is not sure reaction.       Blood pressure 154/72, pulse 65, temperature 97 °F (36.1 °C), resp. rate 18, height 167.6 cm (65.98\"), weight 74.4 kg (164 lb).    Physical Exam   Constitutional: She is oriented to person, place, and time. She appears well-developed and well-nourished. No distress.   HENT:   Head: Normocephalic and atraumatic.   Right Ear: Hearing and external ear normal.   Left Ear: Hearing and external ear normal.   Nose: Nose normal.   Mouth/Throat: Oropharynx is clear and moist and mucous membranes are normal. Mucous membranes are not pale, not dry and not cyanotic. No oral lesions. No oropharyngeal exudate.   Eyes: Conjunctivae and EOM are normal. Right eye exhibits no discharge. Left eye exhibits no discharge. No scleral icterus.   Neck: Trachea normal. Neck supple. No JVD present. No edema present. No thyroid mass and no thyromegaly " present.   Cardiovascular: Normal rate, regular rhythm, S2 normal and normal heart sounds.  Exam reveals no gallop, no S3 and no friction rub.    No murmur heard.  Pulmonary/Chest: Effort normal and breath sounds normal. No respiratory distress. She has no wheezes. She has no rales. She exhibits no tenderness.   Abdominal: Soft. Normal appearance and bowel sounds are normal. She exhibits no distension, no ascites and no mass. There is no splenomegaly or hepatomegaly. There is tenderness in the left lower quadrant. There is no rigidity, no rebound and no guarding. No hernia.   Musculoskeletal: She exhibits no tenderness or deformity.     Vascular Status -  Her right foot exhibits no edema. Her left foot exhibits no edema.  Lymphadenopathy:     She has no cervical adenopathy.        Left: No supraclavicular adenopathy present.   Neurological: She is alert and oriented to person, place, and time. She has normal strength. No cranial nerve deficit or sensory deficit. She exhibits normal muscle tone. Coordination normal.   Skin: No rash noted. She is not diaphoretic. No cyanosis. No pallor. Nails show no clubbing.   Psychiatric: She has a normal mood and affect. Her behavior is normal. Judgment and thought content normal.   Nursing note and vitals reviewed.  Stigmata of chronic liver disease:  None.  Asterixis:  None.    Laboratory Testing:  Upon review of medical records:      Dated April 26, 2017 sodium 125 potassium 3.1 chloride 86 CO2 26 BUN 6 serum creatinine 1.00 glucose 98.  Calcium 9.5.  Albumin 4.60.  T bili 1.1 AST 34 ALT 51 alkaline phosphatase 94.  Lipase 102.  WBC 8.61 hemoglobin 12.2 hematocrit 34.1 MCV 84.8 and platelet count 201.     Dated April 27, 2017 WBC 13.45 hemoglobin 12.9 hematocrit 37.9 MCV 86.7 platelet count 239.     Dated August 29, 2017 sodium 142 potassium 3.7 chloride 109 CO2 22 BUN 7 serum creatinine 0.90 glucose 67.  Calcium on 0.5.  Albumin 4.10.  T bili 0.5 AST 25 ALT 34 alkaline  phosphatase 85. Total protein 7.1.  Lipase 58.  TSH 2.340.  WBC 9.67 hemoglobin 11.6 hematocrit 35.9 MCV 95.2 platelet count 228.    Dated January 24, 2018 sodium 142 potassium 3.9 chloride 104 CO2 29 BUN 9 serum creatinine 0.90 glucose 97.  Calcium 9.7.  Albumin 4.00.  Phosphorus 3.2.    Dated January 25, 2018 gastrin level elevated at 398.     Abdominal imaging:  Upon review of medical records:     Dated April 26, 2017 the patient underwent a CT of the abdomen and pelvis without contrast which revealed: There is a small pericardial effusion and small bilateral pleural effusions with atelectasis, left greater than right.  Small sliding-type hiatal hernia. Liver, gallbladder, spleen, pancreas, adrenal glands appear normal. Nonobstructive bilateral renal stones. There is right renal cortical scarring.  The bowel gas pattern is nonobstructive.  Appendix is not visualized but there are no secondary signs to suggest appendicitis. Moderate stool in the ascending colon.  The bladder is normal in contour. Uterus is atrophic.  There is an isoattenuation mass defined measuring 4 cm consistent with a fibroid. Ovaries are not clearly distinguishable from adjacent unopacified bowel.     Procedures:  Upon review of medical records:     Dated September 28, 2017 the patient underwent an upper endoscopy which revealed: Erythematous distal esophagitis.  1.25 cm clean-based gastric ulcer at the greater curvature.  Erythematous gastritis.  Changes consistent with delayed gastric emptying (partly digested food residue within the stomach, patient nothing by mouth for 10 hours, no gastric outlet obstruction).  Small sliding hiatal hernia less than 3 cm.  Gastric antrum, biopsy revealed reactive gastropathy with minimal chronic gastritis and congestion.  Negative for H. pylori, metaplasia, dysplasia or malignancy.  Gastric, greater curve ulcer area, biopsies revealed mucosal congestion with reactive/hyperplastic foveolar glands,  consistent with an adjacent ulcer.  Focal necro-inflammatory ulcer bed debris.  Negative for H. pylori, metaplasia, dysplasia or malignancy.     Dated January 25, 2018 the patient underwent an upper endoscopy which revealed: Gastric ulcer.  1.25 cm at the greater curvature with blackish eschar.  Risks of bleeding from gastric ulcer with blackish eschar or perhaps 8-10%.  Small sliding hiatal hernia less than 3 cm.  Erythematous gastritis.  Stomach, greater curvature ulcer edges, biopsies revealed active chronic gastritis with regenerative changes and focal surface erosion.  Negative for intestinal metaplasia, dysplasia or malignancy.  Helicobacter negative by immunostain, control stained appropriately.    Assessment:      ICD-10-CM ICD-9-CM   1. Left lower quadrant pain R10.32 789.04   2. Constipation, unspecified constipation type K59.00 564.00   3. Epigastric pain R10.13 789.06   4. Nausea R11.0 787.02   5. Chronic gastric ulcer without hemorrhage and without perforation K25.7 531.70   6. Elevated gastrin level E16.4 251.5         Discussion:  1. The patient has new onset left lower quadrant abdominal pain and tenderness. Likely underlying diverticulitis. Uncomplicated.    2. The patient's epigastric abdominal pain and nausea pain have improved. These were likely related to underlying gastric ulcer.  3. The patient has persistent greater curvature 1.25 cm ulcer.  4. Dated January 25, 2018 the patient was found to have elevated fasting serum gastrin level of 398 (normal being 0-115). Of note the patient was taking pantoprazole 40 mg once a day. The patient may need further workup.    Plan/  Patient Instructions   Low fiber diet (avoid foods, vegetables, cereals, fiber supplements, and seeds) for 5 days thereafter low-fat high-fiber diet.  Cipro (ciprofloxacin) tablets 500 mg. Take 1 tablet by mouth twice a day for 7 days. Side effects were discussed.  Flagyl (metronidazole) tablets 250 mg. Take 1 tablet one by  "mouth 4 times a day for 7 days.  Side effects were discussed.  Avoid laxatives, enemas for next 5 days.  However, for constipation the patient may use \"stool softeners\" 1-3 per day.  1. May take probiotics such as Align.  Take one orally daily while taking antibiotics and 1-2 weeks thereafter.  2. Colonoscopy: Description of the procedure, risks benefits alternatives and options including non-operative options were discussed with the patient in detail.  The patient understands and wishes to proceed.  This may however be postponed for about 3-4 weeks. The patient was supposed to undergo a colonoscopy about couple of months ago. However unfortunately since the patient was having formed stools this was canceled. The patient never had a colonoscopy in the past.  3. Check fasting serum gastrin level.  4. Avoid other NSAIDs. Currently the patient takes one aspirin a day.  5. The patient should take thyroid medication first thing in the morning on an empty stomach, wait for half hour, then take pantoprazole, wait for half hour and then eat.  6. Protonix (Pantoprazole) tablet 40 mg tablet. Take 1 tablet orally in the morning half an hour before eating every day.  7. Discussed with the patient and her  in detail. Opportunity was given to ask questions.  8. The patient should undergo an upper endoscopy to ensure gastric ulcer healing in the near future.  9. The patient will need further evaluation-clarification of elevated serum gastrin level (while on pantoprazole).       Servando Rooney MD  "

## 2018-04-19 RX ORDER — SODIUM CHLORIDE 9 MG/ML
70 INJECTION, SOLUTION INTRAVENOUS CONTINUOUS PRN
Status: CANCELLED | OUTPATIENT
Start: 2018-04-19

## 2018-05-29 RX ORDER — CHLORTHALIDONE 25 MG/1
25 TABLET ORAL DAILY
COMMUNITY
End: 2023-01-17 | Stop reason: ALTCHOICE

## 2018-05-29 RX ORDER — ASPIRIN 81 MG/1
81 TABLET ORAL DAILY
COMMUNITY

## 2018-05-31 ENCOUNTER — ANESTHESIA EVENT (OUTPATIENT)
Dept: GASTROENTEROLOGY | Facility: HOSPITAL | Age: 63
End: 2018-05-31

## 2018-05-31 ENCOUNTER — ANESTHESIA (OUTPATIENT)
Dept: GASTROENTEROLOGY | Facility: HOSPITAL | Age: 63
End: 2018-05-31

## 2018-05-31 ENCOUNTER — HOSPITAL ENCOUNTER (OUTPATIENT)
Facility: HOSPITAL | Age: 63
Setting detail: HOSPITAL OUTPATIENT SURGERY
Discharge: HOME | End: 2018-05-31
Attending: INTERNAL MEDICINE | Admitting: INTERNAL MEDICINE

## 2018-05-31 VITALS
RESPIRATION RATE: 16 BRPM | BODY MASS INDEX: 24.75 KG/M2 | WEIGHT: 154 LBS | HEIGHT: 66 IN | HEART RATE: 60 BPM | OXYGEN SATURATION: 99 % | DIASTOLIC BLOOD PRESSURE: 84 MMHG | TEMPERATURE: 98 F | SYSTOLIC BLOOD PRESSURE: 180 MMHG

## 2018-05-31 DIAGNOSIS — K59.00 CONSTIPATION: ICD-10-CM

## 2018-05-31 LAB — GLUCOSE BLDC GLUCOMTR-MCNC: 108 MG/DL (ref 70–130)

## 2018-05-31 PROCEDURE — S0260 H&P FOR SURGERY: HCPCS | Performed by: INTERNAL MEDICINE

## 2018-05-31 PROCEDURE — 82962 GLUCOSE BLOOD TEST: CPT

## 2018-05-31 PROCEDURE — 25010000002 PROPOFOL 200 MG/20ML EMULSION: Performed by: NURSE ANESTHETIST, CERTIFIED REGISTERED

## 2018-05-31 PROCEDURE — 88305 TISSUE EXAM BY PATHOLOGIST: CPT | Performed by: INTERNAL MEDICINE

## 2018-05-31 PROCEDURE — 45380 COLONOSCOPY AND BIOPSY: CPT | Performed by: INTERNAL MEDICINE

## 2018-05-31 PROCEDURE — 25010000002 MIDAZOLAM PER 1 MG: Performed by: NURSE ANESTHETIST, CERTIFIED REGISTERED

## 2018-05-31 RX ORDER — MEPERIDINE HYDROCHLORIDE 50 MG/ML
INJECTION INTRAMUSCULAR; INTRAVENOUS; SUBCUTANEOUS AS NEEDED
Status: DISCONTINUED | OUTPATIENT
Start: 2018-05-31 | End: 2018-05-31 | Stop reason: SURG

## 2018-05-31 RX ORDER — LIDOCAINE 50 MG/G
OINTMENT TOPICAL AS NEEDED
Status: DISCONTINUED | OUTPATIENT
Start: 2018-05-31 | End: 2018-05-31 | Stop reason: HOSPADM

## 2018-05-31 RX ORDER — MIDAZOLAM HYDROCHLORIDE 1 MG/ML
INJECTION INTRAMUSCULAR; INTRAVENOUS AS NEEDED
Status: DISCONTINUED | OUTPATIENT
Start: 2018-05-31 | End: 2018-05-31 | Stop reason: SURG

## 2018-05-31 RX ORDER — SODIUM CHLORIDE 9 MG/ML
70 INJECTION, SOLUTION INTRAVENOUS CONTINUOUS PRN
Status: DISCONTINUED | OUTPATIENT
Start: 2018-05-31 | End: 2018-05-31 | Stop reason: HOSPADM

## 2018-05-31 RX ORDER — PROPOFOL 10 MG/ML
INJECTION, EMULSION INTRAVENOUS AS NEEDED
Status: DISCONTINUED | OUTPATIENT
Start: 2018-05-31 | End: 2018-05-31 | Stop reason: SURG

## 2018-05-31 RX ADMIN — PROPOFOL 20 MG: 10 INJECTION, EMULSION INTRAVENOUS at 10:45

## 2018-05-31 RX ADMIN — MEPERIDINE HYDROCHLORIDE 50 MG: 50 INJECTION, SOLUTION INTRAMUSCULAR; INTRAVENOUS; SUBCUTANEOUS at 10:19

## 2018-05-31 RX ADMIN — PROPOFOL 20 MG: 10 INJECTION, EMULSION INTRAVENOUS at 10:35

## 2018-05-31 RX ADMIN — PROPOFOL 20 MG: 10 INJECTION, EMULSION INTRAVENOUS at 10:41

## 2018-05-31 RX ADMIN — MIDAZOLAM HYDROCHLORIDE 2 MG: 1 INJECTION, SOLUTION INTRAMUSCULAR; INTRAVENOUS at 10:19

## 2018-05-31 RX ADMIN — PROPOFOL 20 MG: 10 INJECTION, EMULSION INTRAVENOUS at 10:25

## 2018-05-31 RX ADMIN — SODIUM CHLORIDE 70 ML/HR: 9 INJECTION, SOLUTION INTRAVENOUS at 08:34

## 2018-05-31 RX ADMIN — PROPOFOL 20 MG: 10 INJECTION, EMULSION INTRAVENOUS at 10:38

## 2018-05-31 RX ADMIN — PROPOFOL 20 MG: 10 INJECTION, EMULSION INTRAVENOUS at 10:29

## 2018-05-31 RX ADMIN — PROPOFOL 20 MG: 10 INJECTION, EMULSION INTRAVENOUS at 10:32

## 2018-05-31 RX ADMIN — PROPOFOL 50 MG: 10 INJECTION, EMULSION INTRAVENOUS at 10:21

## 2018-05-31 NOTE — ANESTHESIA POSTPROCEDURE EVALUATION
Patient: Trang Maynard    Procedure Summary     Date:  05/31/18 Room / Location:  [facility] ENDOSCOPY 2 / [facility] ENDOSCOPY    Anesthesia Start:  1017 Anesthesia Stop:      Procedure:  COLONOSCOPY with cold biopsy polypectomies and biopsies (N/A Anus) Diagnosis:       Constipation      (Constipation [K59.00])    Surgeon:  Servando Rooney MD Provider:  Jimy Adames CRNA    Anesthesia Type:  MAC ASA Status:  3          Anesthesia Type: MAC  Last vitals  BP   137/74 (05/31/18 0802)   Temp   97.4 °F (36.3 °C) (05/31/18 0802)   Pulse   59 (05/31/18 0802)   Resp   16 (05/31/18 0802)     SpO2   99 % (05/31/18 0802)     Post Anesthesia Care and Evaluation    Patient location during evaluation: PHASE II  Patient participation: complete - patient participated  Level of consciousness: awake  Pain score: 0  Pain management: adequate  Airway patency: patent  Anesthetic complications: No anesthetic complications  PONV Status: none  Cardiovascular status: acceptable  Respiratory status: acceptable and nasal cannula  Hydration status: acceptable    Comments: vsss resp spont, reflexes intact, responsive, report given to pacu nurse

## 2018-05-31 NOTE — ANESTHESIA PREPROCEDURE EVALUATION
Anesthesia Evaluation     Patient summary reviewed and Nursing notes reviewed   no history of anesthetic complications:  NPO Solid Status: > 8 hours  NPO Liquid Status: > 8 hours           Airway   Mallampati: III  TM distance: >3 FB  Neck ROM: limited  possible difficult intubation and No difficulty expected  Dental - normal exam     Pulmonary - normal exam    breath sounds clear to auscultation  (+) a smoker Former, shortness of breath, sleep apnea (Could not tolerate CPAP),   Cardiovascular - normal exam  Exercise tolerance: good (4-7 METS)    PT is on anticoagulation therapy  Patient on routine beta blocker and Beta blocker given within 24 hours of surgery  Rhythm: regular  Rate: normal    (+) hypertension, CAD, cardiac stents more than 12 months ago ZAVALA, PVD, hyperlipidemia,       Neuro/Psych  (+) seizures (2016 petit mal), TIA, tremors, psychiatric history Anxiety and Depression,     GI/Hepatic/Renal/Endo    (+)  GERD, PUD,  renal disease stones, diabetes mellitus type 2 poorly controlled, hypothyroidism,     Musculoskeletal     (+) arthralgias, back pain,   Abdominal    Substance History      OB/GYN          Other   (+) arthritis     ROS/Med Hx Other: fsbs 92                    Anesthesia Plan    ASA 3     MAC   (Pt told that intravenous sedation will be used as the primary anesthetic. Every effort will be made to make sure the patient is comfortable.     The patient was told they may or may not have recall for the procedure.  Will proceed with the plan of care.)  intravenous induction   Anesthetic plan and risks discussed with patient.

## 2018-06-05 LAB
LAB AP CASE REPORT: NORMAL
Lab: NORMAL
PATH REPORT.FINAL DX SPEC: NORMAL

## 2018-06-20 ENCOUNTER — TRANSCRIBE ORDERS (OUTPATIENT)
Dept: ADMINISTRATIVE | Facility: HOSPITAL | Age: 63
End: 2018-06-20

## 2018-06-20 DIAGNOSIS — Z12.39 SCREENING BREAST EXAMINATION: Primary | ICD-10-CM

## 2018-06-29 ENCOUNTER — LAB REQUISITION (OUTPATIENT)
Dept: LAB | Facility: HOSPITAL | Age: 63
End: 2018-06-29

## 2018-06-29 DIAGNOSIS — Z00.00 ROUTINE GENERAL MEDICAL EXAMINATION AT A HEALTH CARE FACILITY: ICD-10-CM

## 2018-06-29 DIAGNOSIS — N18.9 CHRONIC KIDNEY DISEASE: ICD-10-CM

## 2018-06-29 LAB
ALBUMIN SERPL-MCNC: 3.74 G/DL (ref 3.2–4.8)
ALBUMIN/GLOB SERPL: 1.6 G/DL (ref 1.5–2.5)
ALP SERPL-CCNC: 96 U/L (ref 25–100)
ALT SERPL W P-5'-P-CCNC: 32 U/L (ref 7–40)
ANION GAP SERPL CALCULATED.3IONS-SCNC: 8 MMOL/L (ref 3–11)
AST SERPL-CCNC: 30 U/L (ref 0–33)
BILIRUB SERPL-MCNC: 0.5 MG/DL (ref 0.3–1.2)
BUN BLD-MCNC: 11 MG/DL (ref 9–23)
BUN/CREAT SERPL: 11.5 (ref 7–25)
CALCIUM SPEC-SCNC: 8.8 MG/DL (ref 8.7–10.4)
CHLORIDE SERPL-SCNC: 93 MMOL/L (ref 99–109)
CO2 SERPL-SCNC: 31 MMOL/L (ref 20–31)
CREAT BLD-MCNC: 0.96 MG/DL (ref 0.6–1.3)
GFR SERPL CREATININE-BSD FRML MDRD: 58 ML/MIN/1.73
GLOBULIN UR ELPH-MCNC: 2.4 GM/DL
GLUCOSE BLD-MCNC: 135 MG/DL (ref 70–100)
POTASSIUM BLD-SCNC: 3 MMOL/L (ref 3.5–5.5)
PROT SERPL-MCNC: 6.1 G/DL (ref 5.7–8.2)
SODIUM BLD-SCNC: 132 MMOL/L (ref 132–146)

## 2018-06-29 PROCEDURE — 80053 COMPREHEN METABOLIC PANEL: CPT

## 2018-06-30 ENCOUNTER — LAB (OUTPATIENT)
Dept: LAB | Facility: HOSPITAL | Age: 63
End: 2018-06-30

## 2018-06-30 ENCOUNTER — TRANSCRIBE ORDERS (OUTPATIENT)
Dept: LAB | Facility: HOSPITAL | Age: 63
End: 2018-06-30

## 2018-06-30 DIAGNOSIS — E87.6 HYPOPOTASSEMIA: Primary | ICD-10-CM

## 2018-06-30 DIAGNOSIS — E87.6 HYPOPOTASSEMIA: ICD-10-CM

## 2018-06-30 LAB — POTASSIUM BLD-SCNC: 3.7 MMOL/L (ref 3.5–5.1)

## 2018-06-30 PROCEDURE — 84132 ASSAY OF SERUM POTASSIUM: CPT

## 2018-06-30 PROCEDURE — 36415 COLL VENOUS BLD VENIPUNCTURE: CPT

## 2018-07-17 ENCOUNTER — OFFICE VISIT (OUTPATIENT)
Dept: GASTROENTEROLOGY | Facility: CLINIC | Age: 63
End: 2018-07-17

## 2018-07-17 VITALS
SYSTOLIC BLOOD PRESSURE: 193 MMHG | DIASTOLIC BLOOD PRESSURE: 87 MMHG | HEIGHT: 66 IN | RESPIRATION RATE: 16 BRPM | BODY MASS INDEX: 25.88 KG/M2 | HEART RATE: 60 BPM | WEIGHT: 161 LBS | TEMPERATURE: 97 F

## 2018-07-17 DIAGNOSIS — R10.32 LEFT LOWER QUADRANT PAIN: ICD-10-CM

## 2018-07-17 DIAGNOSIS — K25.7 CHRONIC GASTRIC ULCER WITHOUT HEMORRHAGE AND WITHOUT PERFORATION: Primary | ICD-10-CM

## 2018-07-17 DIAGNOSIS — K59.00 CONSTIPATION, UNSPECIFIED CONSTIPATION TYPE: ICD-10-CM

## 2018-07-17 PROCEDURE — 99214 OFFICE O/P EST MOD 30 MIN: CPT | Performed by: INTERNAL MEDICINE

## 2018-07-17 NOTE — PROGRESS NOTES
Chief Complaint   Patient presents with   • Abdominal Pain     History of Present Illness     The patient came back for follow visit today. The patient was having left lower quadrant abdominal pain which was moderately severe, gradual in onset and aching in character. The patient was found to have tenderness in the left lower quadrant consistent with uncomplicated diverticulitis. She was treated with oral antibiotics. She feels much better. She denies further abdominal pain.    The patient denies furthe diarrhea or constipation. She has been having good bowel movements. She denies further upper abdominal pain. Her reflux symptoms are well under control.There is no dysphagia or odynophagia. The patient has history of gastric ulcer. There is no history of overt GI bleed (Hematemesis, melena or hematochezia).There is no liver or pancreatic disease. There is no family history of colon cancer inflammatory bowel disease or chronic liver disease.     Review of Systems   Constitutional: Negative for appetite change, chills, fatigue, fever and unexpected weight change.   HENT: Negative for mouth sores, nosebleeds and trouble swallowing.    Eyes: Negative for discharge and redness.   Respiratory: Positive for apnea. Negative for cough and shortness of breath.    Cardiovascular: Positive for palpitations. Negative for chest pain and leg swelling.   Gastrointestinal: Negative for abdominal distention, abdominal pain, anal bleeding, blood in stool, constipation, diarrhea, nausea and vomiting.   Endocrine: Negative for cold intolerance, heat intolerance and polydipsia.   Genitourinary: Negative for dysuria, hematuria and urgency.   Musculoskeletal: Negative for arthralgias, joint swelling and myalgias.   Skin: Negative for rash.   Allergic/Immunologic: Negative for food allergies and immunocompromised state.   Neurological: Positive for seizures. Negative for dizziness, syncope and headaches.   Hematological: Negative for  adenopathy. Bruises/bleeds easily.   Psychiatric/Behavioral: Negative for dysphoric mood. The patient is not nervous/anxious and is not hyperactive.      Patient Active Problem List   Diagnosis   • Intractable cyclical vomiting with nausea   • Uterine fibroid   • Anxiety   • Dyslipidemia   • Tremors of nervous system   • Hyponatremia   • Hypokalemia   • Hypomagnesemia   • Depression   • Constipation   • Essential hypertension   • Nausea   • Diarrhea   • Colon cancer screening   • Gastric ulcer   • Delayed gastric emptying     Past Medical History:   Diagnosis Date   • Anxiety    • Arthritis    • CAD (coronary artery disease)    • Chronic kidney disease     SEES DR. MIRANDA   • Constipation    • Depression    • Diabetes mellitus (CMS/HCC)    • Diarrhea    • Dysphagia     SPOUSE REPORTS VERY MILD.   • Elevated cholesterol    • Gastric ulcer    • GERD (gastroesophageal reflux disease)    • Heart disease    • History of chest pain     SPOUSE REPORTS JOSE ALBERTOATLEY FEW MONTHS AGO AND THAT PATIENT HAD WORK UP WITH DR MAGAAN.  STATES HAD NUCLEAR STRESS AND IT WAS WNL.     • Hyperlipidemia    • Hypertension    • Hypokalemia    • Kidney stone    • Low sodium levels 2017   • Psychosis    • PVD (peripheral vascular disease) (CMS/[facility])    • Schizophrenia (CMS/[facility])     SPOUSE REPORTED   • Seizures (CMS/[facility]) 01/2016    ONLY HAD ONE EPISODE.  STATED SIDE EFFECTS OF MEDICATION   • Sleep apnea     SPOUSE REPORTS PATIENT WAS NOT ABLE TO TOLERATE THE CPAP.     • Thyroid condition    • TIA (transient ischemic attack)     2010.  SPOUSE REPORTS NO RESIDUAL PROBLEMS.   • Tremors of nervous system     Poss. r/t medications pt is on.   • Tremors of nervous system    • Uterine fibroid    • Vitamin B12 deficiency      Past Surgical History:   Procedure Laterality Date   • CARDIAC CATHETERIZATION      SPOUSE REPORTS 2005 AND 2007 (REPORTS A TOTAL OF 4 STENTS WERE PLACED)   • CARDIAC SURGERY      SPOUSE REPORTS 2 STENTS PLACED IN 2005 AND 2  "STENTS IN 2007   • COLONOSCOPY N/A 5/31/2018    Procedure: COLONOSCOPY with cold biopsy polypectomies and biopsies;  Surgeon: Servando Rooney MD;  Location: [facility] ENDOSCOPY;  Service: Gastroenterology   • CYST REMOVAL      SPOUSE REPORTS REMOVED FROM NECK   • ENDOSCOPY N/A 9/28/2017    Procedure: ESOPHAGOGASTRODUODENOSCOPY WITH BIOPSIES;  Surgeon: Servando Rooney MD;  Location: [facility] ENDOSCOPY;  Service:    • ENDOSCOPY N/A 1/25/2018    Procedure: ESOPHAGOGASTRODUODENOSCOPY with biopsies;  Surgeon: Servando Rooney MD;  Location: [facility] ENDOSCOPY;  Service:    • ENDOSCOPY     • ENDOSCOPY N/A 7/26/2018    Procedure: ESOPHAGOGASTRODUODENOSCOPY WITH BIOPSIES;  Surgeon: Servando Rooney MD;  Location: [facility] ENDOSCOPY;  Service: Gastroenterology   • OTHER SURGICAL HISTORY      SPOUSE REPORTS PATIENT ATTEMPTED COLONOSCOPY BEFORE BUT PREP WAS NOT COMPLETE   • TUBAL ABDOMINAL LIGATION       Family History   Problem Relation Age of Onset   • Heart disease Mother    • Hypertension Mother    • Alcohol abuse Father      Social History   Substance Use Topics   • Smoking status: Former Smoker     Packs/day: 0.25     Years: 5.00     Types: Cigarettes     Quit date: 9/25/1992   • Smokeless tobacco: Never Used      Comment: SPOUSE REPORTS \"SHE DIDN'T INHALE\"   • Alcohol use No       Current Outpatient Prescriptions:   •  aspirin 81 MG EC tablet, Take 81 mg by mouth Daily., Disp: , Rfl:   •  atorvastatin (LIPITOR) 80 MG tablet, Take 80 mg by mouth Daily., Disp: , Rfl: 5  •  benztropine (COGENTIN) 0.5 MG tablet, Take 0.5 mg by mouth Daily., Disp: , Rfl: 2  •  chlorthalidone (HYGROTON) 25 MG tablet, Take 25 mg by mouth Daily., Disp: , Rfl:   •  levothyroxine (SYNTHROID, LEVOTHROID) 25 MCG tablet, Take 25 mcg by mouth Daily., Disp: , Rfl: 3  •  lisinopril (PRINIVIL,ZESTRIL) 20 MG tablet, Take 40 mg by mouth Daily., Disp: , Rfl:   •  metFORMIN (GLUCOPHAGE) 500 MG tablet, Take 250 mg by mouth 2 (Two) Times a Day With Meals. SPOUSE REPORTS " "PATIENT TAKES ONE HALF OF A 500MG TABLET, TWICE DAILY, Disp: , Rfl: 3  •  metoprolol tartrate (LOPRESSOR) 100 MG tablet, Take 100 mg by mouth 2 (Two) Times a Day., Disp: , Rfl: 0  •  Paliperidone Palmitate (INVEGA TRINZA) 546 MG/1.75ML suspension IM injection, Inject 546 mg into the appropriate muscle as directed by prescriber Every 3 (Three) Months., Disp: , Rfl:   •  pantoprazole (PROTONIX) 40 MG EC tablet, TAKE 1 TABLET BY MOUTH DAILY HALF AN HOUR BEFORE BREAKFAST, Disp: 30 tablet, Rfl: 6    Allergies   Allergen Reactions   • Penicillins Other (See Comments)     Pt is not sure reaction.       Blood pressure (!) 193/87, pulse 60, temperature 97 °F (36.1 °C), resp. rate 16, height 167.6 cm (65.98\"), weight 73 kg (161 lb).    Physical Exam   Constitutional: She is oriented to person, place, and time. She appears well-developed and well-nourished. No distress.   HENT:   Head: Normocephalic and atraumatic.   Right Ear: Hearing and external ear normal.   Left Ear: Hearing and external ear normal.   Nose: Nose normal.   Mouth/Throat: Oropharynx is clear and moist and mucous membranes are normal. Mucous membranes are not pale, not dry and not cyanotic. No oral lesions. No oropharyngeal exudate.   Eyes: Conjunctivae and EOM are normal. Right eye exhibits no discharge. Left eye exhibits no discharge. No scleral icterus.   Neck: Trachea normal. Neck supple. No JVD present. No edema present. No thyroid mass and no thyromegaly present.   Cardiovascular: Normal rate, regular rhythm, S2 normal and normal heart sounds.  Exam reveals no gallop, no S3 and no friction rub.    No murmur heard.  Pulmonary/Chest: Effort normal and breath sounds normal. No respiratory distress. She has no wheezes. She has no rales. She exhibits no tenderness.   Abdominal: Soft. Normal appearance and bowel sounds are normal. She exhibits no distension, no ascites and no mass. There is no splenomegaly or hepatomegaly. There is no tenderness. There is no " rigidity, no rebound and no guarding. No hernia.   Musculoskeletal: She exhibits no tenderness or deformity.     Vascular Status -  Her right foot exhibits no edema. Her left foot exhibits no edema.  Lymphadenopathy:     She has no cervical adenopathy.        Left: No supraclavicular adenopathy present.   Neurological: She is alert and oriented to person, place, and time. She has normal strength. No cranial nerve deficit or sensory deficit. She exhibits normal muscle tone. Coordination normal.   Skin: No rash noted. She is not diaphoretic. No cyanosis. No pallor. Nails show no clubbing.   Psychiatric: She has a normal mood and affect. Her behavior is normal. Judgment and thought content normal.   Nursing note and vitals reviewed.  Stigmata of chronic liver disease:  None.  Asterixis:  None.    Laboratory Testing:  Upon review of medical records:      Dated August 29, 2017 sodium 142 potassium 3.7 chloride 109 CO2 22 BUN 7 serum creatinine 0.90 glucose 67.  Calcium on 0.5.  Albumin 4.10.  T bili 0.5 AST 25 ALT 34 alkaline phosphatase 85. Total protein 7.1.  Lipase 58.  TSH 2.340.  WBC 9.67 hemoglobin 11.6 hematocrit 35.9 MCV 95.2 platelet count 228.     Dated January 24, 2018 sodium 142 potassium 3.9 chloride 104 CO2 29 BUN 9 serum creatinine 0.90 glucose 97.  Calcium 9.7.  Albumin 4.00.  Phosphorus 3.2.     Dated January 25, 2018 gastrin level elevated at 398.    Dated June 29, 2018 sodium 132 potassium 3.0 chloride 93 CO2 31 BUN 11 serum creatinine 0.96 glucose 135.  Calcium 8.8.  Total protein 6.1.  Albumin 3.74.  T bili 0.5 AST 30 ALT 32 alkaline phosphatase 96.     Abdominal imaging:  Upon review of medical records:     Dated April 26, 2017 the patient underwent a CT of the abdomen and pelvis without contrast which revealed: There is a small pericardial effusion and small bilateral pleural effusions with atelectasis, left greater than right.  Small sliding-type hiatal hernia. Liver, gallbladder, spleen,  pancreas, adrenal glands appear normal. Nonobstructive bilateral renal stones. There is right renal cortical scarring.  The bowel gas pattern is nonobstructive.  Appendix is not visualized but there are no secondary signs to suggest appendicitis. Moderate stool in the ascending colon.  The bladder is normal in contour. Uterus is atrophic.  There is an isoattenuation mass defined measuring 4 cm consistent with a fibroid. Ovaries are not clearly distinguishable from adjacent unopacified bowel.     Procedures:  Upon review of medical records:     Dated January 25, 2018 the patient underwent an upper endoscopy which revealed: Gastric ulcer.  1.25 cm at the greater curvature with blackish eschar.  Risks of bleeding from gastric ulcer with blackish eschar or perhaps 8-10%.  Small sliding hiatal hernia less than 3 cm.  Erythematous gastritis.  Stomach, greater curvature ulcer edges, biopsies revealed active chronic gastritis with regenerative changes and focal surface erosion.  Negative for intestinal metaplasia, dysplasia or malignancy.  Helicobacter negative by immunostain, control stained appropriately.    Dated May 31, 2018 the patient underwent a colonoscopy to the terminal ileum which revealed: Left-sided diverticulosis.  Small 3 mL sessile colon polyps.  2 were removed in the rectum.  Mild patchy erythema within the terminal ileum.  This changes can be at times associated with use of NSAIDs.  Internal hemorrhoids.  Significant erythema in the perineal area.  Terminal ileum, biopsies revealed benign small bowel mucosa with no diagnostic abnormality.  Random colon biopsies revealed benign colonic mucosa with no diagnostic abnormality.  Rectal polyps, biopsies revealed hyperplastic polyps.  Ascending colon, polyp, biopsy revealed benign colonic mucosa with lymphoid aggregate.    Assessment:      ICD-10-CM ICD-9-CM   1. Chronic gastric ulcer without hemorrhage and without perforation K25.7 531.70   2. Left lower  quadrant pain R10.32 789.04   3. Constipation, unspecified constipation type K59.00 564.00         Discussion:  1.     Plan/  Patient Instructions   1. Low fat moderate fiber and moderate lactose diet.   2. Avoid other NSAIDs. Currently the patient takes one aspirin a day.  3. The patient should take thyroid medication first thing in the morning on an empty stomach, wait for half hour, then take pantoprazole, wait for half hour and then eat.    4. Protonix (Pantoprazole) tablet 40 mg tablet. Take 1 tablet orally in the morning half an hour before eating every day.    5. Upper endoscopy (EGD) counseling to ensure gastric ulcer healing:  Description of the procedure, risks, benefits, alternatives and options including nonoperative options were discussed with the patient in detail.  The patient understands and wishes to proceed.     6. A repeat fasting serum gastrin level in the future once her proton pump can be held.  7. Discussed with the patient and her  in detail. Opportunity was given to ask questions.  8. Follow-up colonoscopy is recommended in 10 years.       Servando Rooney MD

## 2018-07-17 NOTE — PATIENT INSTRUCTIONS
1. Low fat moderate fiber and moderate lactose diet.   2. Avoid other NSAIDs. Currently the patient takes one aspirin a day.  3. The patient should take thyroid medication first thing in the morning on an empty stomach, wait for half hour, then take pantoprazole, wait for half hour and then eat.    4. Protonix (Pantoprazole) tablet 40 mg tablet. Take 1 tablet orally in the morning half an hour before eating every day.    5. Upper endoscopy (EGD) counseling to ensure gastric ulcer healing:  Description of the procedure, risks, benefits, alternatives and options including nonoperative options were discussed with the patient in detail.  The patient understands and wishes to proceed.     6. A repeat fasting serum gastrin level in the future once her proton pump can be held.  7. Discussed with the patient and her  in detail. Opportunity was given to ask questions.  8. Follow-up colonoscopy is recommended in 10 years.

## 2018-07-18 ENCOUNTER — PREP FOR SURGERY (OUTPATIENT)
Dept: OTHER | Facility: HOSPITAL | Age: 63
End: 2018-07-18

## 2018-07-18 DIAGNOSIS — K25.9 GASTRIC ULCER: Primary | ICD-10-CM

## 2018-07-18 RX ORDER — SODIUM CHLORIDE 9 MG/ML
70 INJECTION, SOLUTION INTRAVENOUS CONTINUOUS PRN
Status: CANCELLED | OUTPATIENT
Start: 2018-07-18 | End: 2019-07-18

## 2018-07-24 DIAGNOSIS — R12 HEARTBURN: ICD-10-CM

## 2018-07-25 RX ORDER — PANTOPRAZOLE SODIUM 40 MG/1
40 TABLET, DELAYED RELEASE ORAL DAILY
Qty: 30 TABLET | Refills: 6 | Status: SHIPPED | OUTPATIENT
Start: 2018-07-25

## 2018-07-26 ENCOUNTER — ANESTHESIA (OUTPATIENT)
Dept: GASTROENTEROLOGY | Facility: HOSPITAL | Age: 63
End: 2018-07-26

## 2018-07-26 ENCOUNTER — HOSPITAL ENCOUNTER (OUTPATIENT)
Facility: HOSPITAL | Age: 63
Setting detail: HOSPITAL OUTPATIENT SURGERY
Discharge: HOME | End: 2018-07-26
Attending: INTERNAL MEDICINE | Admitting: INTERNAL MEDICINE

## 2018-07-26 ENCOUNTER — ANESTHESIA EVENT (OUTPATIENT)
Dept: GASTROENTEROLOGY | Facility: HOSPITAL | Age: 63
End: 2018-07-26

## 2018-07-26 VITALS
OXYGEN SATURATION: 100 % | WEIGHT: 161 LBS | BODY MASS INDEX: 25.88 KG/M2 | TEMPERATURE: 97.7 F | DIASTOLIC BLOOD PRESSURE: 77 MMHG | RESPIRATION RATE: 18 BRPM | SYSTOLIC BLOOD PRESSURE: 140 MMHG | HEIGHT: 66 IN | HEART RATE: 72 BPM

## 2018-07-26 DIAGNOSIS — K25.9 GASTRIC ULCER: ICD-10-CM

## 2018-07-26 LAB — GLUCOSE BLDC GLUCOMTR-MCNC: 97 MG/DL (ref 70–130)

## 2018-07-26 PROCEDURE — 82962 GLUCOSE BLOOD TEST: CPT

## 2018-07-26 PROCEDURE — 25010000002 PROPOFOL 200 MG/20ML EMULSION: Performed by: NURSE ANESTHETIST, CERTIFIED REGISTERED

## 2018-07-26 PROCEDURE — S0260 H&P FOR SURGERY: HCPCS | Performed by: INTERNAL MEDICINE

## 2018-07-26 PROCEDURE — 43239 EGD BIOPSY SINGLE/MULTIPLE: CPT | Performed by: INTERNAL MEDICINE

## 2018-07-26 PROCEDURE — 88305 TISSUE EXAM BY PATHOLOGIST: CPT | Performed by: INTERNAL MEDICINE

## 2018-07-26 PROCEDURE — 25010000002 MIDAZOLAM PER 1 MG: Performed by: NURSE ANESTHETIST, CERTIFIED REGISTERED

## 2018-07-26 PROCEDURE — 25010000002 ONDANSETRON PER 1 MG: Performed by: NURSE ANESTHETIST, CERTIFIED REGISTERED

## 2018-07-26 RX ORDER — SODIUM CHLORIDE 0.9 % (FLUSH) 0.9 %
3 SYRINGE (ML) INJECTION AS NEEDED
Status: DISCONTINUED | OUTPATIENT
Start: 2018-07-26 | End: 2018-07-26 | Stop reason: HOSPADM

## 2018-07-26 RX ORDER — PROPOFOL 10 MG/ML
INJECTION, EMULSION INTRAVENOUS AS NEEDED
Status: DISCONTINUED | OUTPATIENT
Start: 2018-07-26 | End: 2018-07-26 | Stop reason: SURG

## 2018-07-26 RX ORDER — SODIUM CHLORIDE 9 MG/ML
70 INJECTION, SOLUTION INTRAVENOUS CONTINUOUS PRN
Status: DISCONTINUED | OUTPATIENT
Start: 2018-07-26 | End: 2018-07-26 | Stop reason: HOSPADM

## 2018-07-26 RX ORDER — MIDAZOLAM HYDROCHLORIDE 1 MG/ML
INJECTION INTRAMUSCULAR; INTRAVENOUS AS NEEDED
Status: DISCONTINUED | OUTPATIENT
Start: 2018-07-26 | End: 2018-07-26 | Stop reason: SURG

## 2018-07-26 RX ORDER — LABETALOL HYDROCHLORIDE 5 MG/ML
INJECTION, SOLUTION INTRAVENOUS AS NEEDED
Status: DISCONTINUED | OUTPATIENT
Start: 2018-07-26 | End: 2018-07-26 | Stop reason: SURG

## 2018-07-26 RX ORDER — ONDANSETRON 2 MG/ML
INJECTION INTRAMUSCULAR; INTRAVENOUS AS NEEDED
Status: DISCONTINUED | OUTPATIENT
Start: 2018-07-26 | End: 2018-07-26 | Stop reason: SURG

## 2018-07-26 RX ADMIN — ONDANSETRON 4 MG: 2 INJECTION INTRAMUSCULAR; INTRAVENOUS at 09:30

## 2018-07-26 RX ADMIN — MIDAZOLAM HYDROCHLORIDE 2 MG: 1 INJECTION, SOLUTION INTRAMUSCULAR; INTRAVENOUS at 09:12

## 2018-07-26 RX ADMIN — LIDOCAINE HYDROCHLORIDE 60 MG: 20 INJECTION, SOLUTION INTRAVENOUS at 09:25

## 2018-07-26 RX ADMIN — LABETALOL HYDROCHLORIDE 10 MG: 5 INJECTION, SOLUTION INTRAVENOUS at 09:33

## 2018-07-26 RX ADMIN — SODIUM CHLORIDE 70 ML/HR: 9 INJECTION, SOLUTION INTRAVENOUS at 07:00

## 2018-07-26 RX ADMIN — PROPOFOL 100 MG: 10 INJECTION, EMULSION INTRAVENOUS at 09:25

## 2018-07-26 RX ADMIN — PROPOFOL 50 MG: 10 INJECTION, EMULSION INTRAVENOUS at 09:30

## 2018-07-26 NOTE — ANESTHESIA PREPROCEDURE EVALUATION
Anesthesia Evaluation     Patient summary reviewed and Nursing notes reviewed   no history of anesthetic complications:  NPO Solid Status: > 8 hours  NPO Liquid Status: > 8 hours           Airway   Mallampati: III  TM distance: >3 FB  Neck ROM: limited  possible difficult intubation and No difficulty expected  Dental - normal exam     Pulmonary - normal exam    breath sounds clear to auscultation  (+) a smoker Former, shortness of breath, sleep apnea (Could not tolerate CPAP),   Cardiovascular - normal exam  Exercise tolerance: good (4-7 METS)    PT is on anticoagulation therapy  Patient on routine beta blocker and Beta blocker given within 24 hours of surgery  Rhythm: regular  Rate: normal    (+) hypertension, CAD, cardiac stents more than 12 months ago ZAVALA, PVD, hyperlipidemia,       Neuro/Psych  (+) seizures (2016 petit mal), TIA, tremors, psychiatric history Anxiety and Depression,     GI/Hepatic/Renal/Endo    (+)  GERD, PUD,  renal disease stones, diabetes mellitus type 2 poorly controlled, hypothyroidism,     Musculoskeletal     (+) arthralgias, back pain,   Abdominal    Substance History      OB/GYN          Other   (+) arthritis     ROS/Med Hx Other: fsbs 97                    Anesthesia Plan    ASA 3     MAC   (Pt told that intravenous sedation will be used as the primary anesthetic. Every effort will be made to make sure the patient is comfortable.     The patient was told they may or may not have recall for the procedure.  Will proceed with the plan of care.)  intravenous induction   Anesthetic plan and risks discussed with patient.

## 2018-07-26 NOTE — ANESTHESIA POSTPROCEDURE EVALUATION
Patient: Trang Maynard    Procedure Summary     Date:  07/26/18 Room / Location:  [facility] ENDOSCOPY 2 / [facility] ENDOSCOPY    Anesthesia Start:  0908 Anesthesia Stop:  0943    Procedure:  ESOPHAGOGASTRODUODENOSCOPY WITH BIOPSIES (N/A Esophagus) Diagnosis:       Gastritis      (Gastric ulcer [K25.9])    Surgeon:  Servando Rooney MD Provider:  Judy Wong CRNA    Anesthesia Type:  MAC ASA Status:  3          Anesthesia Type: MAC  Last vitals  BP   144/60 (07/26/18 0950)   Temp   97.7 °F (36.5 °C) (07/26/18 0950)   Pulse   74 (07/26/18 0950)   Resp   16 (07/26/18 0950)     SpO2   100 % (07/26/18 0950)     Post Anesthesia Care and Evaluation    Patient location during evaluation: PHASE II  Patient participation: complete - patient participated  Level of consciousness: awake and alert  Pain score: 0  Pain management: satisfactory to patient  Airway patency: patent  Anesthetic complications: No anesthetic complications  PONV Status: none  Cardiovascular status: acceptable and stable  Respiratory status: acceptable  Hydration status: acceptable

## 2018-08-01 LAB
LAB AP CASE REPORT: NORMAL
PATH REPORT.FINAL DX SPEC: NORMAL

## 2018-09-01 ENCOUNTER — APPOINTMENT (OUTPATIENT)
Dept: GENERAL RADIOLOGY | Facility: HOSPITAL | Age: 63
End: 2018-09-01

## 2018-09-01 ENCOUNTER — HOSPITAL ENCOUNTER (EMERGENCY)
Facility: HOSPITAL | Age: 63
Discharge: HOME | End: 2018-09-01
Attending: EMERGENCY MEDICINE | Admitting: EMERGENCY MEDICINE

## 2018-09-01 VITALS
BODY MASS INDEX: 25.55 KG/M2 | HEIGHT: 66 IN | TEMPERATURE: 98.4 F | DIASTOLIC BLOOD PRESSURE: 76 MMHG | SYSTOLIC BLOOD PRESSURE: 180 MMHG | OXYGEN SATURATION: 96 % | HEART RATE: 68 BPM | RESPIRATION RATE: 16 BRPM | WEIGHT: 159 LBS
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
## 2018-09-01 DIAGNOSIS — S49.91XA INJURY OF RIGHT UPPER ARM, INITIAL ENCOUNTER: Primary | ICD-10-CM

## 2018-09-01 PROCEDURE — 73080 X-RAY EXAM OF ELBOW: CPT

## 2018-09-01 PROCEDURE — 73060 X-RAY EXAM OF HUMERUS: CPT

## 2018-09-01 PROCEDURE — 99283 EMERGENCY DEPT VISIT LOW MDM: CPT

## 2018-09-01 RX ORDER — HYDROCODONE BITARTRATE AND ACETAMINOPHEN 5; 325 MG/1; MG/1
1 TABLET ORAL ONCE
Status: COMPLETED | OUTPATIENT
Start: 2018-09-01 | End: 2018-09-01

## 2018-09-01 RX ADMIN — HYDROCODONE BITARTRATE AND ACETAMINOPHEN 1 TABLET: 5; 325 TABLET ORAL at 19:35

## 2018-09-01 NOTE — ED PROVIDER NOTES
Subjective   Pt presents to ED for right upper arm injury that happened earlier today. Pt states she was walking at home and there was a comforter in the floor and her foot got caught in it and she tripped and fell, landing on right arm. She is c/o pain in right upper arm and elbow. Denies shoulder or neck pain. Did not hit head. No other complaints.         History provided by:  Patient and spouse   used: No    Upper Extremity Issue   Location:  Arm and elbow  Arm location:  R upper arm  Elbow location:  R elbow  Injury: yes    Time since incident:  3 hours  Mechanism of injury: fall    Fall:     Fall occurred:  Tripped and walking    Impact surface:  Hard floor    Point of impact: right arm   Pain details:     Quality:  Throbbing    Radiates to:  Does not radiate    Severity:  Moderate    Onset quality:  Sudden    Timing:  Constant    Progression:  Unchanged  Dislocation: no    Prior injury to area:  No  Relieved by:  None tried  Worsened by:  Movement and stretching area  Ineffective treatments:  None tried  Associated symptoms: decreased range of motion and stiffness    Associated symptoms: no fever, no neck pain and no swelling    Risk factors: no concern for non-accidental trauma and no known bone disorder        Review of Systems   Constitutional: Negative for activity change, chills and fever.   HENT: Negative for congestion, rhinorrhea and sore throat.    Eyes: Negative for pain and redness.   Respiratory: Negative for cough, shortness of breath and wheezing.    Cardiovascular: Negative for chest pain.   Gastrointestinal: Negative for abdominal distention, diarrhea, nausea and vomiting.   Endocrine: Negative for polyphagia and polyuria.   Genitourinary: Negative for decreased urine volume and difficulty urinating.   Musculoskeletal: Positive for stiffness. Negative for arthralgias, myalgias and neck pain.   Skin: Negative for rash and wound.   Allergic/Immunologic: Negative for food  allergies and immunocompromised state.   Neurological: Negative for dizziness and headaches.   Hematological: Negative for adenopathy. Does not bruise/bleed easily.   Psychiatric/Behavioral: Negative for agitation and confusion.   All other systems reviewed and are negative.      Past Medical History:   Diagnosis Date   • Anxiety    • Arthritis    • CAD (coronary artery disease)    • Chronic kidney disease     SEES DR. MIRANDA   • Constipation    • Depression    • Diabetes mellitus (CMS/[facility])    • Diarrhea    • Dysphagia     SPOUSE REPORTS VERY MILD.   • Elevated cholesterol    • Gastric ulcer    • GERD (gastroesophageal reflux disease)    • Heart disease    • History of chest pain     SPOUSE REPORTS APPROXIMATLEY FEW MONTHS AGO AND THAT PATIENT HAD WORK UP WITH DR MAGANA.  STATES HAD NUCLEAR STRESS AND IT WAS WNL.     • Hyperlipidemia    • Hypertension    • Hypokalemia    • Kidney stone    • Low sodium levels 2017   • Psychosis    • PVD (peripheral vascular disease) (CMS/[facility])    • Schizophrenia (CMS/[facility])     SPOUSE REPORTED   • Seizures (CMS/[facility]) 01/2016    ONLY HAD ONE EPISODE.  STATED SIDE EFFECTS OF MEDICATION   • Sleep apnea     SPOUSE REPORTS PATIENT WAS NOT ABLE TO TOLERATE THE CPAP.     • Thyroid condition    • TIA (transient ischemic attack)     2010.  SPOUSE REPORTS NO RESIDUAL PROBLEMS.   • Tremors of nervous system     Poss. r/t medications pt is on.   • Tremors of nervous system    • Uterine fibroid    • Vitamin B12 deficiency        Allergies   Allergen Reactions   • Penicillins Other (See Comments)     Pt is not sure reaction.       Past Surgical History:   Procedure Laterality Date   • CARDIAC CATHETERIZATION      SPOUSE REPORTS 2005 AND 2007 (REPORTS A TOTAL OF 4 STENTS WERE PLACED)   • CARDIAC SURGERY      SPOUSE REPORTS 2 STENTS PLACED IN 2005 AND 2 STENTS IN 2007   • COLONOSCOPY N/A 5/31/2018    Procedure: COLONOSCOPY with cold biopsy polypectomies and biopsies;  Surgeon: Servando Rooney MD;   "Location: [facility] ENDOSCOPY;  Service: Gastroenterology   • CYST REMOVAL      SPOUSE REPORTS REMOVED FROM NECK   • ENDOSCOPY N/A 9/28/2017    Procedure: ESOPHAGOGASTRODUODENOSCOPY WITH BIOPSIES;  Surgeon: Servando Rooney MD;  Location: [facility] ENDOSCOPY;  Service:    • ENDOSCOPY N/A 1/25/2018    Procedure: ESOPHAGOGASTRODUODENOSCOPY with biopsies;  Surgeon: Servando Rooney MD;  Location: [facility] ENDOSCOPY;  Service:    • ENDOSCOPY     • ENDOSCOPY N/A 7/26/2018    Procedure: ESOPHAGOGASTRODUODENOSCOPY WITH BIOPSIES;  Surgeon: Servando Rooney MD;  Location: [facility] ENDOSCOPY;  Service: Gastroenterology   • OTHER SURGICAL HISTORY      SPOUSE REPORTS PATIENT ATTEMPTED COLONOSCOPY BEFORE BUT PREP WAS NOT COMPLETE   • TUBAL ABDOMINAL LIGATION         Family History   Problem Relation Age of Onset   • Heart disease Mother    • Hypertension Mother    • Alcohol abuse Father        Social History     Social History   • Marital status:      Social History Main Topics   • Smoking status: Former Smoker     Packs/day: 0.25     Years: 5.00     Types: Cigarettes     Quit date: 9/25/1992   • Smokeless tobacco: Never Used      Comment: SPOUSE REPORTS \"SHE DIDN'T INHALE\"   • Alcohol use No   • Drug use: No   • Sexual activity: Defer     Other Topics Concern   • Not on file           Objective   Physical Exam   Constitutional: She is oriented to person, place, and time. She appears well-developed and well-nourished.   HENT:   Head: Normocephalic and atraumatic.   Eyes: Pupils are equal, round, and reactive to light. EOM are normal.   Neck: Normal range of motion. Neck supple.   Cardiovascular: Normal rate, regular rhythm and normal heart sounds.    Pulmonary/Chest: Effort normal and breath sounds normal.   Abdominal: Soft. Bowel sounds are normal.   Musculoskeletal:        Right elbow: She exhibits decreased range of motion. She exhibits no effusion and no deformity.        Right upper arm: She exhibits tenderness. She exhibits no " deformity.   Neurological: She is alert and oriented to person, place, and time.   Skin: Skin is warm and dry.   Psychiatric: She has a normal mood and affect. Her behavior is normal. Judgment and thought content normal.   Nursing note and vitals reviewed.      Splint - Cast - Strapping  Date/Time: 9/1/2018 8:08 PM  Performed by: GUEVARA STAHL  Authorized by: SHEELA CASE     Consent:     Consent obtained:  Verbal    Consent given by:  Patient    Risks discussed:  Discoloration, numbness, pain and swelling    Alternatives discussed:  No treatment  Pre-procedure details:     Sensation:  Normal  Procedure details:     Laterality:  Right    Location:  Arm    Arm:  R upper arm    Supplies:  Sling  Post-procedure details:     Pain:  Improved    Sensation:  Normal    Patient tolerance of procedure:  Tolerated well, no immediate complications               ED Course                  MDM  Number of Diagnoses or Management Options     Amount and/or Complexity of Data Reviewed  Clinical lab tests: ordered and reviewed  Tests in the radiology section of CPT®: ordered and reviewed  Tests in the medicine section of CPT®: ordered and reviewed    Patient Progress  Patient progress: stable        Final diagnoses:   Injury of right upper arm, initial encounter   Fall, initial encounter            Guevara Stahl PA  09/01/18 [id number]

## 2018-12-19 ENCOUNTER — TRANSCRIBE ORDERS (OUTPATIENT)
Dept: ADMINISTRATIVE | Facility: HOSPITAL | Age: 63
End: 2018-12-19

## 2018-12-19 DIAGNOSIS — Z12.39 ENCOUNTER FOR SCREENING FOR MALIGNANT NEOPLASM OF BREAST: Primary | ICD-10-CM

## 2019-03-26 ENCOUNTER — TRANSCRIBE ORDERS (OUTPATIENT)
Dept: MAMMOGRAPHY | Facility: HOSPITAL | Age: 64
End: 2019-03-26

## 2019-03-26 DIAGNOSIS — Z12.39 BREAST CANCER SCREENING: Primary | ICD-10-CM

## 2019-03-27 ENCOUNTER — TRANSCRIBE ORDERS (OUTPATIENT)
Dept: ADMINISTRATIVE | Facility: HOSPITAL | Age: 64
End: 2019-03-27

## 2019-03-27 DIAGNOSIS — Z12.39 ENCOUNTER FOR SCREENING FOR MALIGNANT NEOPLASM OF BREAST: Primary | ICD-10-CM

## 2019-05-15 ENCOUNTER — APPOINTMENT (OUTPATIENT)
Dept: MAMMOGRAPHY | Facility: HOSPITAL | Age: 64
End: 2019-05-15

## 2019-12-18 ENCOUNTER — APPOINTMENT (OUTPATIENT)
Dept: MAMMOGRAPHY | Facility: HOSPITAL | Age: 64
End: 2019-12-18

## 2020-02-05 ENCOUNTER — APPOINTMENT (OUTPATIENT)
Dept: MAMMOGRAPHY | Facility: HOSPITAL | Age: 65
End: 2020-02-05

## 2020-08-27 ENCOUNTER — TRANSCRIBE ORDERS (OUTPATIENT)
Dept: ADMINISTRATIVE | Facility: HOSPITAL | Age: 65
End: 2020-08-27

## 2020-08-27 DIAGNOSIS — N18.30 CHRONIC KIDNEY DISEASE, STAGE III (MODERATE) (HCC): Primary | ICD-10-CM

## 2020-09-21 ENCOUNTER — APPOINTMENT (OUTPATIENT)
Dept: ULTRASOUND IMAGING | Facility: HOSPITAL | Age: 65
End: 2020-09-21

## 2020-10-16 ENCOUNTER — TRANSCRIBE ORDERS (OUTPATIENT)
Dept: ADMINISTRATIVE | Facility: HOSPITAL | Age: 65
End: 2020-10-16

## 2020-10-16 DIAGNOSIS — Z12.31 VISIT FOR SCREENING MAMMOGRAM: Primary | ICD-10-CM

## 2020-10-23 ENCOUNTER — HOSPITAL ENCOUNTER (OUTPATIENT)
Dept: ULTRASOUND IMAGING | Facility: HOSPITAL | Age: 65
End: 2020-10-23

## 2020-12-09 ENCOUNTER — APPOINTMENT (OUTPATIENT)
Dept: MAMMOGRAPHY | Facility: HOSPITAL | Age: 65
End: 2020-12-09

## 2021-03-24 ENCOUNTER — HOSPITAL ENCOUNTER (OUTPATIENT)
Dept: MAMMOGRAPHY | Facility: HOSPITAL | Age: 66
Discharge: HOME | End: 2021-03-24
Admitting: INTERNAL MEDICINE

## 2021-03-24 DIAGNOSIS — Z12.31 VISIT FOR SCREENING MAMMOGRAM: ICD-10-CM

## 2021-03-24 PROCEDURE — 77067 SCR MAMMO BI INCL CAD: CPT

## 2021-03-24 PROCEDURE — 77063 BREAST TOMOSYNTHESIS BI: CPT

## 2021-05-19 ENCOUNTER — OFFICE VISIT (OUTPATIENT)
Dept: GASTROENTEROLOGY | Facility: CLINIC | Age: 66
End: 2021-05-19

## 2021-05-19 VITALS
BODY MASS INDEX: 25.88 KG/M2 | HEIGHT: 66 IN | TEMPERATURE: 98 F | RESPIRATION RATE: 16 BRPM | DIASTOLIC BLOOD PRESSURE: 66 MMHG | HEART RATE: 74 BPM | SYSTOLIC BLOOD PRESSURE: 152 MMHG | WEIGHT: 161 LBS

## 2021-05-19 DIAGNOSIS — R10.31 BILATERAL LOWER ABDOMINAL CRAMPING: Chronic | ICD-10-CM

## 2021-05-19 DIAGNOSIS — K21.00 GASTROESOPHAGEAL REFLUX DISEASE WITH ESOPHAGITIS WITHOUT HEMORRHAGE: Chronic | ICD-10-CM

## 2021-05-19 DIAGNOSIS — Z12.11 ENCOUNTER FOR SCREENING FOR MALIGNANT NEOPLASM OF COLON: ICD-10-CM

## 2021-05-19 DIAGNOSIS — R10.32 BILATERAL LOWER ABDOMINAL CRAMPING: Chronic | ICD-10-CM

## 2021-05-19 DIAGNOSIS — R19.7 DIARRHEA, UNSPECIFIED TYPE: Primary | Chronic | ICD-10-CM

## 2021-05-19 DIAGNOSIS — K31.84 GASTROPARESIS: Chronic | ICD-10-CM

## 2021-05-19 PROBLEM — K30 DELAYED GASTRIC EMPTYING: Status: RESOLVED | Noted: 2017-12-01 | Resolved: 2021-05-19

## 2021-05-19 PROBLEM — R11.0 NAUSEA: Status: RESOLVED | Noted: 2017-08-30 | Resolved: 2021-05-19

## 2021-05-19 PROBLEM — K59.00 CONSTIPATION: Status: RESOLVED | Noted: 2017-04-27 | Resolved: 2021-05-19

## 2021-05-19 PROBLEM — K25.9 GASTRIC ULCER: Status: RESOLVED | Noted: 2017-12-01 | Resolved: 2021-05-19

## 2021-05-19 PROBLEM — R11.15 INTRACTABLE CYCLICAL VOMITING WITH NAUSEA: Status: RESOLVED | Noted: 2017-04-26 | Resolved: 2021-05-19

## 2021-05-19 PROCEDURE — 99214 OFFICE O/P EST MOD 30 MIN: CPT | Performed by: NURSE PRACTITIONER

## 2021-05-19 RX ORDER — DICYCLOMINE HCL 20 MG
20 TABLET ORAL 3 TIMES DAILY PRN
Qty: 30 TABLET | Refills: 1 | Status: SHIPPED | OUTPATIENT
Start: 2021-05-19 | End: 2021-07-08 | Stop reason: SDUPTHER

## 2021-05-19 NOTE — PROGRESS NOTES
Follow Up Note     Date: 2021   Patient Name: Trang Maynard  MRN: [id number]  : 1955     Primary Care Provider: Rdo Vance MD     Chief Complaint   Patient presents with   • Diarrhea     History of present illness:   2021  Tarng Maynard is a 66 y.o. female who is here today for follow up for diarrhea.     The patient has a history of diarrhea for several years that is unchanged. She has diarrhea 2-3 times per week with 3-4 episodes per day. She takes Imodium as needed with no improvement. She has lower abdominal cramping associated with the diarrhea. She frequently has bloating associated with diarrhea.  Patient denies rectal bleeding. Of interest, the patient is on Metformin and eats ice cream regularly.    She has a history of nausea in the past, but currently, she is doing well in terms of nausea. No vomiting. She has a history of reflux that is well controlled with Pantoprazole. No difficulty swallowing.     Interval History:  2018 per Dr. Servando oRoney  The patient came back for follow visit today. The patient was having left lower quadrant abdominal pain which was moderately severe, gradual in onset and aching in character. The patient was found to have tenderness in the left lower quadrant consistent with uncomplicated diverticulitis. She was treated with oral antibiotics. She feels much better. She denies further abdominal pain.     The patient denies furthe diarrhea or constipation. She has been having good bowel movements. She denies further upper abdominal pain. Her reflux symptoms are well under control.There is no dysphagia or odynophagia. The patient has history of gastric ulcer. There is no history of overt GI bleed (Hematemesis, melena or hematochezia).There is no liver or pancreatic disease. There is no family history of colon cancer inflammatory bowel disease or chronic liver disease.    Subjective      Past Medical History:   Diagnosis Date   •  Anxiety    • Arthritis    • CAD (coronary artery disease)     stent 2007   • Chronic kidney disease     SEES DR. MIRANDA   • Constipation    • Depression    • Diabetes mellitus (CMS/[facility])    • Diarrhea    • Dysphagia     SPOUSE REPORTS VERY MILD.   • Elevated cholesterol    • Gastric ulcer    • GERD (gastroesophageal reflux disease)    • Heart disease    • History of chest pain     SPOUSE REPORTS APPROXIMATLEY FEW MONTHS AGO AND THAT PATIENT HAD WORK UP WITH DR MAGANA.  STATES HAD NUCLEAR STRESS AND IT WAS WNL.     • Hyperlipidemia    • Hypertension    • Hypokalemia    • Kidney stone    • Low sodium levels 2017   • Psychosis (CMS/[facility])    • PVD (peripheral vascular disease) (CMS/[facility])    • Schizophrenia (CMS/[facility])     SPOUSE REPORTED   • Seizures (CMS/[facility]) 01/2016    ONLY HAD ONE EPISODE.  STATED SIDE EFFECTS OF MEDICATION   • Sleep apnea     SPOUSE REPORTS PATIENT WAS NOT ABLE TO TOLERATE THE CPAP.     • Thyroid condition    • TIA (transient ischemic attack)     2010.  SPOUSE REPORTS NO RESIDUAL PROBLEMS.   • Tremors of nervous system     Poss. r/t medications pt is on.   • Uterine fibroid    • Vitamin B12 deficiency      Past Surgical History:   Procedure Laterality Date   • CARDIAC CATHETERIZATION      SPOUSE REPORTS 2005 AND 2007 (REPORTS A TOTAL OF 4 STENTS WERE PLACED)   • CARDIAC SURGERY      SPOUSE REPORTS 2 STENTS PLACED IN 2005 AND 2 STENTS IN 2007   • COLONOSCOPY N/A 5/31/2018    Procedure: COLONOSCOPY with cold biopsy polypectomies and biopsies;  Surgeon: Servando Rooney MD;  Location: [facility] ENDOSCOPY;  Service: Gastroenterology   • CYST REMOVAL      SPOUSE REPORTS REMOVED FROM NECK   • ENDOSCOPY N/A 9/28/2017    Procedure: ESOPHAGOGASTRODUODENOSCOPY WITH BIOPSIES;  Surgeon: Servando Rooney MD;  Location: [facility] ENDOSCOPY;  Service:    • ENDOSCOPY N/A 1/25/2018    Procedure: ESOPHAGOGASTRODUODENOSCOPY with biopsies;  Surgeon: Servando Rooney MD;  Location: [facility] ENDOSCOPY;  Service:    • ENDOSCOPY     •  "ENDOSCOPY N/A 2018    Procedure: ESOPHAGOGASTRODUODENOSCOPY WITH BIOPSIES;  Surgeon: Servando Rooney MD;  Location: [facility] ENDOSCOPY;  Service: Gastroenterology   • OTHER SURGICAL HISTORY      SPOUSE REPORTS PATIENT ATTEMPTED COLONOSCOPY BEFORE BUT PREP WAS NOT COMPLETE   • TUBAL ABDOMINAL LIGATION       Family History   Problem Relation Age of Onset   • Heart disease Mother    • Hypertension Mother    • Alcohol abuse Father    • Colon cancer Neg Hx      Social History     Socioeconomic History   • Marital status:      Spouse name: Not on file   • Number of children: Not on file   • Years of education: Not on file   • Highest education level: Not on file   Tobacco Use   • Smoking status: Former Smoker     Packs/day: 0.25     Years: 5.00     Pack years: 1.25     Types: Cigarettes     Quit date: 1992     Years since quittin.6   • Smokeless tobacco: Never Used   • Tobacco comment: SPOUSE REPORTS \"SHE DIDN'T INHALE\"   Vaping Use   • Vaping Use: Never used   Substance and Sexual Activity   • Alcohol use: No   • Drug use: No   • Sexual activity: Defer       Current Outpatient Medications:   •  aspirin 81 MG EC tablet, Take 81 mg by mouth Daily., Disp: , Rfl:   •  atorvastatin (LIPITOR) 80 MG tablet, Take 80 mg by mouth Daily., Disp: , Rfl: 5  •  benztropine (COGENTIN) 0.5 MG tablet, Take 0.5 mg by mouth Daily., Disp: , Rfl: 2  •  chlorthalidone (HYGROTON) 25 MG tablet, Take 25 mg by mouth Daily., Disp: , Rfl:   •  levothyroxine (SYNTHROID, LEVOTHROID) 25 MCG tablet, Take 25 mcg by mouth Daily., Disp: , Rfl: 3  •  lisinopril (PRINIVIL,ZESTRIL) 20 MG tablet, Take 40 mg by mouth Daily., Disp: , Rfl:   •  metFORMIN (GLUCOPHAGE) 500 MG tablet, Take 500 mg by mouth 2 (Two) Times a Day With Meals. SPOUSE REPORTS PATIENT TAKES ONE HALF OF A 500MG TABLET, TWICE DAILY, Disp: , Rfl: 3  •  metoprolol tartrate (LOPRESSOR) 100 MG tablet, Take 100 mg by mouth 2 (Two) Times a Day., Disp: , Rfl: 0  •  Paliperidone " Palmitate (INVEGA TRINZA) 546 MG/1.75ML suspension IM injection, Inject 546 mg into the appropriate muscle as directed by prescriber Every 3 (Three) Months., Disp: , Rfl:   •  pantoprazole (PROTONIX) 40 MG EC tablet, TAKE 1 TABLET BY MOUTH DAILY HALF AN HOUR BEFORE BREAKFAST, Disp: 30 tablet, Rfl: 6  •  dicyclomine (BENTYL) 20 MG tablet, Take 1 tablet by mouth 3 (Three) Times a Day As Needed (lower abdominal pain and diarrhea)., Disp: 30 tablet, Rfl: 1     Allergies   Allergen Reactions   • Penicillins Other (See Comments)     Pt is not sure reaction.     The following portions of the patient's history were reviewed and updated as appropriate: allergies, current medications, past family history, past medical history, past social history, past surgical history and problem list.  Objective     Physical Exam  Vitals and nursing note reviewed.   Constitutional:       General: She is awake. She is not in acute distress.     Appearance: Normal appearance. She is well-developed. She is not diaphoretic.   HENT:      Head: Normocephalic and atraumatic.      Right Ear: Hearing and external ear normal.      Left Ear: Hearing and external ear normal.      Mouth/Throat:      Mouth: Mucous membranes are not pale, not dry and not cyanotic.   Eyes:      General: Lids are normal.         Right eye: No discharge.         Left eye: No discharge.      Conjunctiva/sclera: Conjunctivae normal.   Neck:      Trachea: Trachea normal.   Cardiovascular:      Rate and Rhythm: Normal rate and regular rhythm.      Heart sounds: Normal heart sounds.   Pulmonary:      Effort: Pulmonary effort is normal. No respiratory distress.      Breath sounds: Normal breath sounds.   Abdominal:      General: Bowel sounds are normal. There is no distension.      Palpations: Abdomen is not rigid. There is no hepatomegaly or splenomegaly.      Tenderness: There is no abdominal tenderness. There is no guarding or rebound.   Musculoskeletal:         General: Normal  "range of motion.      Cervical back: Normal range of motion. No edema.   Skin:     General: Skin is warm and dry.      Coloration: Skin is not pale.      Findings: No rash.      Nails: There is no clubbing.   Neurological:      Mental Status: She is alert and oriented to person, place, and time.   Psychiatric:         Mood and Affect: Mood normal.         Speech: Speech normal.         Behavior: Behavior is cooperative.       Vitals:    05/19/21 1421   BP: 152/66   Pulse: 74   Resp: 16   Temp: 98 °F (36.7 °C)   Weight: 73 kg (161 lb)   Height: 167.6 cm (66\")     Results Review:   I reviewed the patient's new clinical results.    No visits with results within 90 Day(s) from this visit.   Latest known visit with results is:   Admission on 07/26/2018, Discharged on 07/26/2018   Component Date Value Ref Range Status   • Glucose 07/26/2018 97  70 - 130 mg/dL Final    Serial Number: [id number]:  [id number]   • Case Report 07/26/2018    Final                    Value:Surgical Pathology Report                         Case: [id number]                                  Authorizing Provider:  Servando Rooney MD          Collected:           07/26/2018 09:29 AM          Ordering Location:     [facility]    Received:            07/27/2018 09:35 AM                                 [facility]                                                                    Pathologist:           Hernan Shah MD                                                           Specimens:   1) - Gastric, Body, GREATER CURVE AREA OF HEALED ULCER                                              2) - Small Intestine, Duodenum, SECOND PORTION DUODENAL BIOPSY                            • Final Diagnosis 07/26/2018    Final                    Value:This result contains rich text formatting which cannot be displayed here.      Dated May 31, 2018 the patient underwent a colonoscopy to the terminal ileum which revealed: Left-sided diverticulosis.  " Small 3 mL sessile colon polyps.  2 were removed in the rectum.  Mild patchy erythema within the terminal ileum.  This changes can be at times associated with use of NSAIDs.  Internal hemorrhoids.  Significant erythema in the perineal area.  Terminal ileum, biopsies revealed benign small bowel mucosa with no diagnostic abnormality.  Random colon biopsies revealed benign colonic mucosa with no diagnostic abnormality.  Rectal polyps, biopsies revealed hyperplastic polyps.  Ascending colon, polyp, biopsy revealed benign colonic mucosa with lymphoid aggregate.    EGD dated 7/26/2018 tiny sliding hiatal hernia.  Erythematous gastritis.  Complete healing of gastric ulcer.  Stomach biopsy greater curve of healed ulcer minimal chronic gastritis.  Negative for H. pylori, metaplasia, dysplasia or malignancy.  Second portion of duodenum biopsy with no pathologic alterations.  Negative for celiac, microorganisms, metaplasia or atypia.    Dated 1/16/2021 total bilirubin 0.6 alkaline phosphatase 64 AST 18 ALT 16 albumin 3.9 hemoglobin 12 hematocrit 37 platelet count 193 vitamin D 1145 TSH 2.57 hemoglobin A1c 5.9     Assessment / Plan      1. Diarrhea, unspecified type  2. Bilateral lower abdominal cramping  Longstanding history of diarrhea with lower abdominal cramping.  She has diarrhea 2-3 times per week with 3-4 episodes per day.  She takes Imodium as needed with no improvement.  There is no history of rectal bleeding.  Recent TSH normal.  Colonoscopy with random colon biopsies unremarkable, no evidence of IBD or microscopic colitis. The patient is on metformin, and eats ice cream regularly. Suspect irritable bowel syndrome with diarrhea.  Low FODMAP diet - avoid dairy  Imodium 2 mg 1/2-1 caplet 1-2 times per day as needed for >3 episodes of diarrhea.  Bentyl 20 mg 1 po 3 times per day as needed for lower abdominal cramping and diarrhea.  Labs  CTAP to rule out solid organ pathology.    - CT Abdomen Pelvis Without Contrast;  Future  - CBC (No Diff); Future  - Comprehensive Metabolic Panel; Future  - IgA; Future  - Tissue Transglutaminase, IgA; Future  - dicyclomine (BENTYL) 20 MG tablet; Take 1 tablet by mouth 3 (Three) Times a Day As Needed (lower abdominal pain and diarrhea).  Dispense: 30 tablet; Refill: 1    3. Gastroparesis  Longstanding history of epigastric pain and nausea.  Currently she is doing well and has not been having any epigastric pain or nausea.  EGD in 2017 with changes consistent with delayed gastric emptying.  The patient is a diabetic and is on psychiatric medications, which can contribute to gastroparesis.  Will monitor for now as patient is not symptomatic.    4. Gastroesophageal reflux disease with esophagitis without hemorrhage  Longstanding history of reflux.  Reasonable control with pantoprazole daily.  Patient has tried to decrease/stop PPI therapy in the past and reflux was severe.  EGD in 2018 with no Guzman's noted. Patient has a history of gastric ulcer per EGD in January 2018. Complete healing of ulcer noted on follow up EGD. Recent labs normal, no anemia.  Antireflux measures.  Continue pantoprazole 40 mg daily for now.    5. Encounter for screening for malignant neoplasm of colon  Patient's last colonoscopy was in May 2018 with 2 small sessile polyps removed, hyperplastic.  There is no family history of colon cancer.  Colonoscopy for colon cancer screening in 10 years, 2028, per current ACG guidelines.    Patient Instructions   Antireflux measures: Avoid fried, fatty foods, alcohol, chocolate, coffee, tea,  soft drinks, peppermint and spearmint, spicy foods, tomatoes and tomato based foods, onion based foods, and smoking. Other antireflux measures include weight reduction if overweight, avoiding tight clothing around the abdomen, elevating the head of the bed 6 inches with blocks under the head board, and don't drink or eat before going to bed and avoid lying down immediately after  meals.  Pantoprazole 40 mg 1 po daily in the am 30 minutes before breakfast.  Recommended to take Levothyroxine first in the am upon waking, wait 30 minutes, then take Pantoprazole 40 mg, wait 30 minutes, then eat breakfast and take other medications.   The patient should eat 4-5 very small meals throughout the day. Avoid large meals.   Bentyl 20 mg 1 po 3 times per day as needed for lower abdominal cramping and diarrhea.   May take Imodium 2 mg 1/2-1 caplet 1-2 times per day as needed for >3 episodes of diarrhea per day.  It is recommended to eat a softer diet. Meats are best consumed ground. Fruits and vegetables are best consumed cooked or steamed and then mashed.   Low FODMAP diet - avoid all dairy  Labs  CT abdomen and pelvis  Follow up: 8 weeks or sooner if symptoms worsen    The patient was seen along with her .     Low-FODMAP Eating Plan    FODMAPs (fermentable oligosaccharides, disaccharides, monosaccharides, and polyols) are sugars that are hard for some people to digest. A low-FODMAP eating plan may help some people who have bowel (intestinal) diseases to manage their symptoms.  This meal plan can be complicated to follow. Work with a diet and nutrition specialist (dietitian) to make a low-FODMAP eating plan that is right for you. A dietitian can make sure that you get enough nutrition from this diet.  What are tips for following this plan?  Reading food labels  · Check labels for hidden FODMAPs such as:  ? High-fructose syrup.  ? Honey.  ? Agave.  ? Natural fruit flavors.  ? Onion or garlic powder.  · Choose low-FODMAP foods that contain 3-4 grams of fiber per serving.  · Check food labels for serving sizes. Eat only one serving at a time to make sure FODMAP levels stay low.  Meal planning  · Follow a low-FODMAP eating plan for up to 6 weeks, or as told by your health care provider or dietitian.  · To follow the eating plan:  1. Eliminate high-FODMAP foods from your diet completely.  2. Gradually  reintroduce high-FODMAP foods into your diet one at a time. Most people should wait a few days after introducing one high-FODMAP food before they introduce the next high-FODMAP food. Your dietitian can recommend how quickly you may reintroduce foods.  3. Keep a daily record of what you eat and drink, and make note of any symptoms that you have after eating.  4. Review your daily record with a dietitian regularly. Your dietitian can help you identify which foods you can eat and which foods you should avoid.  General tips  · Drink enough fluid each day to keep your urine pale yellow.  · Avoid processed foods. These often have added sugar and may be high in FODMAPs.  · Avoid most dairy products, whole grains, and sweeteners.  · Work with a dietitian to make sure you get enough fiber in your diet.  Recommended foods  Grains  · Gluten-free grains, such as rice, oats, buckwheat, quinoa, corn, polenta, and millet. Gluten-free pasta, bread, or cereal. Rice noodles. Corn tortillas.  Vegetables  · Eggplant, zucchini, cucumber, peppers, green beans, [place] sprouts, bean sprouts, lettuce, arugula, kale, Swiss chard, spinach, mi greens, bok lorenzo, summer squash, potato, and tomato. Limited amounts of corn, carrot, and sweet potato. Green parts of scallions.  Fruits  · Bananas, oranges, bharti, limes, blueberries, raspberries, strawberries, grapes, cantaloupe, honeydew melon, kiwi, papaya, passion fruit, and pineapple. Limited amounts of dried cranberries, banana chips, and shredded coconut.  Dairy  · Lactose-free milk, yogurt, and kefir. Lactose-free cottage cheese and ice cream. Non-dairy milks, such as almond, coconut, hemp, and rice milk. Yogurts made of non-dairy milks. Limited amounts of goat cheese, brie, mozzarella, parmesan, swiss, and other hard cheeses.  Meats and other protein foods  · Unseasoned beef, pork, poultry, or fish. Eggs. Villa. Tofu (firm) and tempeh. Limited amounts of nuts and seeds, such as  "almonds, walnuts, brazil nuts, pecans, peanuts, pumpkin seeds, shira seeds, and sunflower seeds.  Fats and oils  · Butter-free spreads. Vegetable oils, such as olive, canola, and sunflower oil.  Seasoning and other foods  · Artificial sweeteners with names that do not end in \"ol\" such as aspartame, saccharine, and stevia. Maple syrup, white table sugar, raw sugar, brown sugar, and molasses. Fresh basil, coriander, parsley, rosemary, and thyme.  Beverages  · Water and mineral water. Sugar-sweetened soft drinks. Small amounts of orange juice or cranberry juice. Black and green tea. Most dry marvin. Coffee.  This may not be a complete list of low-FODMAP foods. Talk with your dietitian for more information.  Foods to avoid  Grains  · Wheat, including kamut, durum, and semolina. Barley and bulgur. Couscous. Wheat-based cereals. Wheat noodles, bread, crackers, and pastries.  Vegetables  · Chicory root, artichoke, asparagus, cabbage, snow peas, sugar snap peas, mushrooms, and cauliflower. Onions, garlic, leeks, and the white part of scallions.  Fruits  · Fresh, dried, and juiced forms of apple, pear, watermelon, peach, plum, cherries, apricots, blackberries, boysenberries, figs, nectarines, and césar. Avocado.  Dairy  · Milk, yogurt, ice cream, and soft cheese. Cream and sour cream. Milk-based sauces. Custard.  Meats and other protein foods  · Fried or fatty meat. Sausage. Cashews and pistachios. Soybeans, baked beans, black beans, chickpeas, kidney beans, eris beans, navy beans, lentils, and split peas.  Seasoning and other foods  · Any sugar-free gum or candy. Foods that contain artificial sweeteners such as sorbitol, mannitol, isomalt, or xylitol. Foods that contain honey, high-fructose corn syrup, or agave. Bouillon, vegetable stock, beef stock, and chicken stock. Garlic and onion powder. Condiments made with onion, such as hummus, chutney, pickles, relish, salad dressing, and salsa. Tomato " paste.  Beverages  · Chicory-based drinks. Coffee substitutes. Chamomile tea. Fennel tea. Sweet or fortified marvin such as port or stephanie. Diet soft drinks made with isomalt, mannitol, maltitol, sorbitol, or xylitol. Apple, pear, and césar juice. Juices with high-fructose corn syrup.  This may not be a complete list of high-FODMAP foods. Talk with your dietitian to discuss what dietary choices are best for you.   Summary  · A low-FODMAP eating plan is a short-term diet that eliminates FODMAPs from your diet to help ease symptoms of certain bowel diseases.  · The eating plan usually lasts up to 6 weeks. After that, high-FODMAP foods are restarted gradually, one at a time, so you can find out which may be causing symptoms.  · A low-FODMAP eating plan can be complicated. It is best to work with a dietitian who has experience with this type of plan.  This information is not intended to replace advice given to you by your health care provider. Make sure you discuss any questions you have with your health care provider.  Document Revised: 11/30/2018 Document Reviewed: 08/14/2018  [employer] Patient Education © 2021 [employer]    Julisa Rabago, APRN  5/19/2021    Please note that portions of this note may have been completed with a voice recognition program. Efforts were made to edit the dictations, but occasionally words are mistranscribed.

## 2021-05-19 NOTE — PATIENT INSTRUCTIONS
Antireflux measures: Avoid fried, fatty foods, alcohol, chocolate, coffee, tea,  soft drinks, peppermint and spearmint, spicy foods, tomatoes and tomato based foods, onion based foods, and smoking. Other antireflux measures include weight reduction if overweight, avoiding tight clothing around the abdomen, elevating the head of the bed 6 inches with blocks under the head board, and don't drink or eat before going to bed and avoid lying down immediately after meals.  Pantoprazole 40 mg 1 po daily in the am 30 minutes before breakfast.  Recommended to take Levothyroxine first in the am upon waking, wait 30 minutes, then take Pantoprazole 40 mg, wait 30 minutes, then eat breakfast and take other medications.   The patient should eat 4-5 very small meals throughout the day. Avoid large meals.   Bentyl 20 mg 1 po 3 times per day as needed for lower abdominal cramping and diarrhea.   May take Imodium 2 mg 1/2-1 caplet 1-2 times per day as needed for >3 episodes of diarrhea per day.  It is recommended to eat a softer diet. Meats are best consumed ground. Fruits and vegetables are best consumed cooked or steamed and then mashed.   Low FODMAP diet - avoid all dairy  Labs  CT abdomen and pelvis  Follow up: 8 weeks or sooner if symptoms worsen    The patient was seen along with her .     Low-FODMAP Eating Plan    FODMAPs (fermentable oligosaccharides, disaccharides, monosaccharides, and polyols) are sugars that are hard for some people to digest. A low-FODMAP eating plan may help some people who have bowel (intestinal) diseases to manage their symptoms.  This meal plan can be complicated to follow. Work with a diet and nutrition specialist (dietitian) to make a low-FODMAP eating plan that is right for you. A dietitian can make sure that you get enough nutrition from this diet.  What are tips for following this plan?  Reading food labels  · Check labels for hidden FODMAPs such as:  ? High-fructose  syrup.  ? Honey.  ? Agave.  ? Natural fruit flavors.  ? Onion or garlic powder.  · Choose low-FODMAP foods that contain 3-4 grams of fiber per serving.  · Check food labels for serving sizes. Eat only one serving at a time to make sure FODMAP levels stay low.  Meal planning  · Follow a low-FODMAP eating plan for up to 6 weeks, or as told by your health care provider or dietitian.  · To follow the eating plan:  1. Eliminate high-FODMAP foods from your diet completely.  2. Gradually reintroduce high-FODMAP foods into your diet one at a time. Most people should wait a few days after introducing one high-FODMAP food before they introduce the next high-FODMAP food. Your dietitian can recommend how quickly you may reintroduce foods.  3. Keep a daily record of what you eat and drink, and make note of any symptoms that you have after eating.  4. Review your daily record with a dietitian regularly. Your dietitian can help you identify which foods you can eat and which foods you should avoid.  General tips  · Drink enough fluid each day to keep your urine pale yellow.  · Avoid processed foods. These often have added sugar and may be high in FODMAPs.  · Avoid most dairy products, whole grains, and sweeteners.  · Work with a dietitian to make sure you get enough fiber in your diet.  Recommended foods  Grains  · Gluten-free grains, such as rice, oats, buckwheat, quinoa, corn, polenta, and millet. Gluten-free pasta, bread, or cereal. Rice noodles. Corn tortillas.  Vegetables  · Eggplant, zucchini, cucumber, peppers, green beans, [place] sprouts, bean sprouts, lettuce, arugula, kale, Swiss chard, spinach, mi greens, bok lorenzo, summer squash, potato, and tomato. Limited amounts of corn, carrot, and sweet potato. Green parts of scallions.  Fruits  · Bananas, oranges, bharti, limes, blueberries, raspberries, strawberries, grapes, cantaloupe, honeydew melon, kiwi, papaya, passion fruit, and pineapple. Limited amounts of dried  "cranberries, banana chips, and shredded coconut.  Dairy  · Lactose-free milk, yogurt, and kefir. Lactose-free cottage cheese and ice cream. Non-dairy milks, such as almond, coconut, hemp, and rice milk. Yogurts made of non-dairy milks. Limited amounts of goat cheese, brie, mozzarella, parmesan, swiss, and other hard cheeses.  Meats and other protein foods  · Unseasoned beef, pork, poultry, or fish. Eggs. Villa. Tofu (firm) and tempeh. Limited amounts of nuts and seeds, such as almonds, walnuts, brazil nuts, pecans, peanuts, pumpkin seeds, shira seeds, and sunflower seeds.  Fats and oils  · Butter-free spreads. Vegetable oils, such as olive, canola, and sunflower oil.  Seasoning and other foods  · Artificial sweeteners with names that do not end in \"ol\" such as aspartame, saccharine, and stevia. Maple syrup, white table sugar, raw sugar, brown sugar, and molasses. Fresh basil, coriander, parsley, rosemary, and thyme.  Beverages  · Water and mineral water. Sugar-sweetened soft drinks. Small amounts of orange juice or cranberry juice. Black and green tea. Most dry marvin. Coffee.  This may not be a complete list of low-FODMAP foods. Talk with your dietitian for more information.  Foods to avoid  Grains  · Wheat, including kamut, durum, and semolina. Barley and bulgur. Couscous. Wheat-based cereals. Wheat noodles, bread, crackers, and pastries.  Vegetables  · Chicory root, artichoke, asparagus, cabbage, snow peas, sugar snap peas, mushrooms, and cauliflower. Onions, garlic, leeks, and the white part of scallions.  Fruits  · Fresh, dried, and juiced forms of apple, pear, watermelon, peach, plum, cherries, apricots, blackberries, boysenberries, figs, nectarines, and césar. Avocado.  Dairy  · Milk, yogurt, ice cream, and soft cheese. Cream and sour cream. Milk-based sauces. Custard.  Meats and other protein foods  · Fried or fatty meat. Sausage. Cashews and pistachios. Soybeans, baked beans, black beans, chickpeas, kidney " beans, eris beans, navy beans, lentils, and split peas.  Seasoning and other foods  · Any sugar-free gum or candy. Foods that contain artificial sweeteners such as sorbitol, mannitol, isomalt, or xylitol. Foods that contain honey, high-fructose corn syrup, or agave. Bouillon, vegetable stock, beef stock, and chicken stock. Garlic and onion powder. Condiments made with onion, such as hummus, chutney, pickles, relish, salad dressing, and salsa. Tomato paste.  Beverages  · Chicory-based drinks. Coffee substitutes. Chamomile tea. Fennel tea. Sweet or fortified marvin such as port or stephanie. Diet soft drinks made with isomalt, mannitol, maltitol, sorbitol, or xylitol. Apple, pear, and césar juice. Juices with high-fructose corn syrup.  This may not be a complete list of high-FODMAP foods. Talk with your dietitian to discuss what dietary choices are best for you.   Summary  · A low-FODMAP eating plan is a short-term diet that eliminates FODMAPs from your diet to help ease symptoms of certain bowel diseases.  · The eating plan usually lasts up to 6 weeks. After that, high-FODMAP foods are restarted gradually, one at a time, so you can find out which may be causing symptoms.  · A low-FODMAP eating plan can be complicated. It is best to work with a dietitian who has experience with this type of plan.  This information is not intended to replace advice given to you by your health care provider. Make sure you discuss any questions you have with your health care provider.  Document Revised: 11/30/2018 Document Reviewed: 08/14/2018  [employer] Patient Education © 2021 [employer]

## 2021-06-03 ENCOUNTER — LAB (OUTPATIENT)
Dept: LAB | Facility: HOSPITAL | Age: 66
End: 2021-06-03

## 2021-06-03 ENCOUNTER — HOSPITAL ENCOUNTER (OUTPATIENT)
Dept: CT IMAGING | Facility: HOSPITAL | Age: 66
Discharge: HOME | End: 2021-06-03

## 2021-06-03 DIAGNOSIS — R19.7 DIARRHEA, UNSPECIFIED TYPE: Chronic | ICD-10-CM

## 2021-06-03 DIAGNOSIS — R10.31 BILATERAL LOWER ABDOMINAL CRAMPING: Chronic | ICD-10-CM

## 2021-06-03 DIAGNOSIS — R10.32 BILATERAL LOWER ABDOMINAL CRAMPING: Chronic | ICD-10-CM

## 2021-06-03 LAB
ALBUMIN SERPL-MCNC: 3.9 G/DL (ref 3.5–5.2)
ALBUMIN/GLOB SERPL: 1.4 G/DL
ALP SERPL-CCNC: 68 U/L (ref 39–117)
ALT SERPL W P-5'-P-CCNC: 16 U/L (ref 1–33)
ANION GAP SERPL CALCULATED.3IONS-SCNC: 9 MMOL/L (ref 5–15)
AST SERPL-CCNC: 22 U/L (ref 1–32)
BILIRUB SERPL-MCNC: 0.7 MG/DL (ref 0–1.2)
BUN SERPL-MCNC: 8 MG/DL (ref 8–23)
BUN/CREAT SERPL: 9 (ref 7–25)
CALCIUM SPEC-SCNC: 9.5 MG/DL (ref 8.6–10.5)
CHLORIDE SERPL-SCNC: 99 MMOL/L (ref 98–107)
CO2 SERPL-SCNC: 29 MMOL/L (ref 22–29)
CREAT SERPL-MCNC: 0.89 MG/DL (ref 0.57–1)
DEPRECATED RDW RBC AUTO: 46.5 FL (ref 37–54)
ERYTHROCYTE [DISTWIDTH] IN BLOOD BY AUTOMATED COUNT: 13.1 % (ref 12.3–15.4)
GFR SERPL CREATININE-BSD FRML MDRD: 63 ML/MIN/1.73
GLOBULIN UR ELPH-MCNC: 2.8 GM/DL
GLUCOSE SERPL-MCNC: 99 MG/DL (ref 65–99)
HCT VFR BLD AUTO: 37 % (ref 34–46.6)
HGB BLD-MCNC: 12.1 G/DL (ref 12–15.9)
IGA1 MFR SER: 235 MG/DL (ref 70–400)
MCH RBC QN AUTO: 31.5 PG (ref 26.6–33)
MCHC RBC AUTO-ENTMCNC: 32.7 G/DL (ref 31.5–35.7)
MCV RBC AUTO: 96.4 FL (ref 79–97)
PLATELET # BLD AUTO: 232 10*3/MM3 (ref 140–450)
PMV BLD AUTO: 11.1 FL (ref 6–12)
POTASSIUM SERPL-SCNC: 3.9 MMOL/L (ref 3.5–5.2)
PROT SERPL-MCNC: 6.7 G/DL (ref 6–8.5)
RBC # BLD AUTO: 3.84 10*6/MM3 (ref 3.77–5.28)
SODIUM SERPL-SCNC: 137 MMOL/L (ref 136–145)
WBC # BLD AUTO: 8.29 10*3/MM3 (ref 3.4–10.8)

## 2021-06-03 PROCEDURE — 0 DIATRIZOATE MEGLUMINE & SODIUM PER 1 ML: Performed by: NURSE PRACTITIONER

## 2021-06-03 PROCEDURE — 83516 IMMUNOASSAY NONANTIBODY: CPT

## 2021-06-03 PROCEDURE — 82784 ASSAY IGA/IGD/IGG/IGM EACH: CPT

## 2021-06-03 PROCEDURE — 74176 CT ABD & PELVIS W/O CONTRAST: CPT

## 2021-06-03 PROCEDURE — 80053 COMPREHEN METABOLIC PANEL: CPT

## 2021-06-03 PROCEDURE — 85027 COMPLETE CBC AUTOMATED: CPT

## 2021-06-03 PROCEDURE — 36415 COLL VENOUS BLD VENIPUNCTURE: CPT

## 2021-06-03 RX ADMIN — DIATRIZOATE MEGLUMINE AND DIATRIZOATE SODIUM 30 ML: 660; 100 LIQUID ORAL; RECTAL at 12:11

## 2021-06-04 LAB — TTG IGA SER-ACNC: <2 U/ML (ref 0–3)

## 2021-07-08 ENCOUNTER — OFFICE VISIT (OUTPATIENT)
Dept: GASTROENTEROLOGY | Facility: CLINIC | Age: 66
End: 2021-07-08

## 2021-07-08 VITALS
WEIGHT: 157 LBS | TEMPERATURE: 98.6 F | DIASTOLIC BLOOD PRESSURE: 79 MMHG | HEIGHT: 66 IN | HEART RATE: 66 BPM | BODY MASS INDEX: 25.23 KG/M2 | SYSTOLIC BLOOD PRESSURE: 163 MMHG | RESPIRATION RATE: 16 BRPM

## 2021-07-08 DIAGNOSIS — K58.0 IRRITABLE BOWEL SYNDROME WITH DIARRHEA: Primary | Chronic | ICD-10-CM

## 2021-07-08 DIAGNOSIS — Z12.11 ENCOUNTER FOR SCREENING FOR MALIGNANT NEOPLASM OF COLON: ICD-10-CM

## 2021-07-08 DIAGNOSIS — K31.84 GASTROPARESIS: Chronic | ICD-10-CM

## 2021-07-08 DIAGNOSIS — K21.00 GASTROESOPHAGEAL REFLUX DISEASE WITH ESOPHAGITIS WITHOUT HEMORRHAGE: Chronic | ICD-10-CM

## 2021-07-08 PROBLEM — R10.32 BILATERAL LOWER ABDOMINAL CRAMPING: Status: RESOLVED | Noted: 2021-05-19 | Resolved: 2021-07-08

## 2021-07-08 PROBLEM — R10.31 BILATERAL LOWER ABDOMINAL CRAMPING: Status: RESOLVED | Noted: 2021-05-19 | Resolved: 2021-07-08

## 2021-07-08 PROCEDURE — 99214 OFFICE O/P EST MOD 30 MIN: CPT | Performed by: NURSE PRACTITIONER

## 2021-07-08 RX ORDER — HYDROCHLOROTHIAZIDE 12.5 MG/1
12.5 CAPSULE, GELATIN COATED ORAL DAILY
COMMUNITY

## 2021-07-08 RX ORDER — DICYCLOMINE HCL 20 MG
20 TABLET ORAL 3 TIMES DAILY PRN
Qty: 30 TABLET | Refills: 2 | Status: SHIPPED | OUTPATIENT
Start: 2021-07-08

## 2021-07-08 RX ORDER — ONDANSETRON 4 MG/1
4 TABLET, ORALLY DISINTEGRATING ORAL EVERY 8 HOURS PRN
Qty: 30 TABLET | Refills: 1 | Status: SHIPPED | OUTPATIENT
Start: 2021-07-08

## 2021-07-08 NOTE — PATIENT INSTRUCTIONS
Antireflux measures: Avoid fried, fatty foods, alcohol, chocolate, coffee, tea,  soft drinks, peppermint and spearmint, spicy foods, tomatoes and tomato based foods, onion based foods, and smoking. Other antireflux measures include weight reduction if overweight, avoiding tight clothing around the abdomen, elevating the head of the bed 6 inches with blocks under the head board, and don't drink or eat before going to bed and avoid lying down immediately after meals.  Pantoprazole 40 mg 1 po daily in the am 30 minutes before breakfast.  Recommended to take Levothyroxine first in the am upon waking, wait 30 minutes, then take Pantoprazole 40 mg, wait 30 minutes, then eat breakfast and take other medications.  Zofran 4 mg 1 po every 8 hours as needed for nausea.   The patient should eat 4-5 very small meals throughout the day. Avoid large meals.   It is recommended to eat a softer diet. Meats are best consumed ground. Fruits and vegetables are best consumed cooked or steamed and then mashed.   Low FODMAP diet - avoid dairy  Imodium 2 mg 1/2-1 po 1-2 times per day as needed for diarrhea.   Bentyl 20 mg 1 po 3 times per day as needed for lower abdominal cramping and diarrhea.   Avoid sugar free candies.  Colonoscopy for screening in 10 years, 2028.  Follow up: 6 months or sooner if needed    Low-FODMAP Eating Plan    FODMAPs (fermentable oligosaccharides, disaccharides, monosaccharides, and polyols) are sugars that are hard for some people to digest. A low-FODMAP eating plan may help some people who have bowel (intestinal) diseases to manage their symptoms.  This meal plan can be complicated to follow. Work with a diet and nutrition specialist (dietitian) to make a low-FODMAP eating plan that is right for you. A dietitian can make sure that you get enough nutrition from this diet.  What are tips for following this plan?  Reading food labels  · Check labels for hidden FODMAPs such as:  ? High-fructose  syrup.  ? Honey.  ? Agave.  ? Natural fruit flavors.  ? Onion or garlic powder.  · Choose low-FODMAP foods that contain 3-4 grams of fiber per serving.  · Check food labels for serving sizes. Eat only one serving at a time to make sure FODMAP levels stay low.  Meal planning  · Follow a low-FODMAP eating plan for up to 6 weeks, or as told by your health care provider or dietitian.  · To follow the eating plan:  1. Eliminate high-FODMAP foods from your diet completely.  2. Gradually reintroduce high-FODMAP foods into your diet one at a time. Most people should wait a few days after introducing one high-FODMAP food before they introduce the next high-FODMAP food. Your dietitian can recommend how quickly you may reintroduce foods.  3. Keep a daily record of what you eat and drink, and make note of any symptoms that you have after eating.  4. Review your daily record with a dietitian regularly. Your dietitian can help you identify which foods you can eat and which foods you should avoid.  General tips  · Drink enough fluid each day to keep your urine pale yellow.  · Avoid processed foods. These often have added sugar and may be high in FODMAPs.  · Avoid most dairy products, whole grains, and sweeteners.  · Work with a dietitian to make sure you get enough fiber in your diet.  Recommended foods  Grains  · Gluten-free grains, such as rice, oats, buckwheat, quinoa, corn, polenta, and millet. Gluten-free pasta, bread, or cereal. Rice noodles. Corn tortillas.  Vegetables  · Eggplant, zucchini, cucumber, peppers, green beans, [place] sprouts, bean sprouts, lettuce, arugula, kale, Swiss chard, spinach, mi greens, bok lorenzo, summer squash, potato, and tomato. Limited amounts of corn, carrot, and sweet potato. Green parts of scallions.  Fruits  · Bananas, oranges, bharti, limes, blueberries, raspberries, strawberries, grapes, cantaloupe, honeydew melon, kiwi, papaya, passion fruit, and pineapple. Limited amounts of dried  "cranberries, banana chips, and shredded coconut.  Dairy  · Lactose-free milk, yogurt, and kefir. Lactose-free cottage cheese and ice cream. Non-dairy milks, such as almond, coconut, hemp, and rice milk. Yogurts made of non-dairy milks. Limited amounts of goat cheese, brie, mozzarella, parmesan, swiss, and other hard cheeses.  Meats and other protein foods  · Unseasoned beef, pork, poultry, or fish. Eggs. Villa. Tofu (firm) and tempeh. Limited amounts of nuts and seeds, such as almonds, walnuts, brazil nuts, pecans, peanuts, pumpkin seeds, shira seeds, and sunflower seeds.  Fats and oils  · Butter-free spreads. Vegetable oils, such as olive, canola, and sunflower oil.  Seasoning and other foods  · Artificial sweeteners with names that do not end in \"ol\" such as aspartame, saccharine, and stevia. Maple syrup, white table sugar, raw sugar, brown sugar, and molasses. Fresh basil, coriander, parsley, rosemary, and thyme.  Beverages  · Water and mineral water. Sugar-sweetened soft drinks. Small amounts of orange juice or cranberry juice. Black and green tea. Most dry marvin. Coffee.  This may not be a complete list of low-FODMAP foods. Talk with your dietitian for more information.  Foods to avoid  Grains  · Wheat, including kamut, durum, and semolina. Barley and bulgur. Couscous. Wheat-based cereals. Wheat noodles, bread, crackers, and pastries.  Vegetables  · Chicory root, artichoke, asparagus, cabbage, snow peas, sugar snap peas, mushrooms, and cauliflower. Onions, garlic, leeks, and the white part of scallions.  Fruits  · Fresh, dried, and juiced forms of apple, pear, watermelon, peach, plum, cherries, apricots, blackberries, boysenberries, figs, nectarines, and césar. Avocado.  Dairy  · Milk, yogurt, ice cream, and soft cheese. Cream and sour cream. Milk-based sauces. Custard.  Meats and other protein foods  · Fried or fatty meat. Sausage. Cashews and pistachios. Soybeans, baked beans, black beans, chickpeas, kidney " beans, eris beans, navy beans, lentils, and split peas.  Seasoning and other foods  · Any sugar-free gum or candy. Foods that contain artificial sweeteners such as sorbitol, mannitol, isomalt, or xylitol. Foods that contain honey, high-fructose corn syrup, or agave. Bouillon, vegetable stock, beef stock, and chicken stock. Garlic and onion powder. Condiments made with onion, such as hummus, chutney, pickles, relish, salad dressing, and salsa. Tomato paste.  Beverages  · Chicory-based drinks. Coffee substitutes. Chamomile tea. Fennel tea. Sweet or fortified mavrin such as port or stephanie. Diet soft drinks made with isomalt, mannitol, maltitol, sorbitol, or xylitol. Apple, pear, and césar juice. Juices with high-fructose corn syrup.  This may not be a complete list of high-FODMAP foods. Talk with your dietitian to discuss what dietary choices are best for you.   Summary  · A low-FODMAP eating plan is a short-term diet that eliminates FODMAPs from your diet to help ease symptoms of certain bowel diseases.  · The eating plan usually lasts up to 6 weeks. After that, high-FODMAP foods are restarted gradually, one at a time, so you can find out which may be causing symptoms.  · A low-FODMAP eating plan can be complicated. It is best to work with a dietitian who has experience with this type of plan.  This information is not intended to replace advice given to you by your health care provider. Make sure you discuss any questions you have with your health care provider.  Document Revised: 11/30/2018 Document Reviewed: 08/14/2018  [employer] Patient Education © 2021 [employer]

## 2021-07-08 NOTE — PROGRESS NOTES
Follow Up Note     Date: 2021   Patient Name: Trang Maynard  MRN: [id number]  : 1955     Primary Care Provider: Rod Vance MD     Chief Complaint   Patient presents with   • Follow-up   • Diarrhea     History of present illness:   2021  Trang Maynard is a 66 y.o. female who is here today for follow up for diarrhea.     Symptoms unchanged.  No rectal bleeding.  She has switched to almond milk and is cutting back on dairy.    Interval History:  2021  The patient has a history of diarrhea for several years that is unchanged. She has diarrhea 2-3 times per week with 3-4 episodes per day. She takes Imodium as needed with no improvement. She has lower abdominal cramping associated with the diarrhea. She frequently has bloating associated with diarrhea.  Patient denies rectal bleeding. Of interest, the patient is on Metformin and eats ice cream regularly.     She has a history of nausea in the past, but currently, she is doing well in terms of nausea. No vomiting. She has a history of reflux that is well controlled with Pantoprazole. No difficulty swallowing.      2018 per Dr. Servando Rooney  The patient came back for follow visit today. The patient was having left lower quadrant abdominal pain which was moderately severe, gradual in onset and aching in character. The patient was found to have tenderness in the left lower quadrant consistent with uncomplicated diverticulitis. She was treated with oral antibiotics. She feels much better. She denies further abdominal pain.     The patient denies furthe diarrhea or constipation. She has been having good bowel movements. She denies further upper abdominal pain. Her reflux symptoms are well under control.There is no dysphagia or odynophagia. The patient has history of gastric ulcer. There is no history of overt GI bleed (Hematemesis, melena or hematochezia).There is no liver or pancreatic disease. There is no family history of  colon cancer inflammatory bowel disease or chronic liver disease.    Subjective      Past Medical History:   Diagnosis Date   • Anxiety    • Arthritis    • CAD (coronary artery disease)     stent 2007   • Chronic kidney disease     SEES DR. MIRANDA   • Constipation    • Depression    • Diabetes mellitus (CMS/HCC)    • Diarrhea    • Dysphagia     SPOUSE REPORTS VERY MILD.   • Elevated cholesterol    • Gastric ulcer    • GERD (gastroesophageal reflux disease)    • Heart disease    • History of chest pain     SPOUSE REPORTS APPROXIMATLEY FEW MONTHS AGO AND THAT PATIENT HAD WORK UP WITH DR MAGANA.  STATES HAD NUCLEAR STRESS AND IT WAS WNL.     • Hyperlipidemia    • Hypertension    • Hypokalemia    • Kidney stone    • Low sodium levels 2017   • Psychosis (CMS/HCC)    • PVD (peripheral vascular disease) (CMS/HCC)    • Schizophrenia (CMS/HCC)     SPOUSE REPORTED   • Seizures (CMS/[facility]) 01/2016    ONLY HAD ONE EPISODE.  STATED SIDE EFFECTS OF MEDICATION   • Sleep apnea     SPOUSE REPORTS PATIENT WAS NOT ABLE TO TOLERATE THE CPAP.     • Thyroid condition    • TIA (transient ischemic attack)     2010.  SPOUSE REPORTS NO RESIDUAL PROBLEMS.   • Tremors of nervous system     Poss. r/t medications pt is on.   • Uterine fibroid    • Vitamin B12 deficiency      Past Surgical History:   Procedure Laterality Date   • CARDIAC CATHETERIZATION      SPOUSE REPORTS 2005 AND 2007 (REPORTS A TOTAL OF 4 STENTS WERE PLACED)   • CARDIAC SURGERY      SPOUSE REPORTS 2 STENTS PLACED IN 2005 AND 2 STENTS IN 2007   • COLONOSCOPY N/A 5/31/2018    Procedure: COLONOSCOPY with cold biopsy polypectomies and biopsies;  Surgeon: Servando Rooney MD;  Location: [facility] ENDOSCOPY;  Service: Gastroenterology   • CYST REMOVAL      SPOUSE REPORTS REMOVED FROM NECK   • ENDOSCOPY N/A 9/28/2017    Procedure: ESOPHAGOGASTRODUODENOSCOPY WITH BIOPSIES;  Surgeon: Servando Rooney MD;  Location: [facility] ENDOSCOPY;  Service:    • ENDOSCOPY N/A 1/25/2018    Procedure:  "ESOPHAGOGASTRODUODENOSCOPY with biopsies;  Surgeon: Servando Rooney MD;  Location: [facility] ENDOSCOPY;  Service:    • ENDOSCOPY     • ENDOSCOPY N/A 2018    Procedure: ESOPHAGOGASTRODUODENOSCOPY WITH BIOPSIES;  Surgeon: Servando Rooney MD;  Location: [facility] ENDOSCOPY;  Service: Gastroenterology   • OTHER SURGICAL HISTORY      SPOUSE REPORTS PATIENT ATTEMPTED COLONOSCOPY BEFORE BUT PREP WAS NOT COMPLETE   • TUBAL ABDOMINAL LIGATION       Family History   Problem Relation Age of Onset   • Heart disease Mother    • Hypertension Mother    • Alcohol abuse Father    • Colon cancer Neg Hx      Social History     Socioeconomic History   • Marital status:      Spouse name: Not on file   • Number of children: Not on file   • Years of education: Not on file   • Highest education level: Not on file   Tobacco Use   • Smoking status: Former Smoker     Packs/day: 0.25     Years: 5.00     Pack years: 1.25     Types: Cigarettes     Quit date: 1992     Years since quittin.8   • Smokeless tobacco: Never Used   • Tobacco comment: SPOUSE REPORTS \"SHE DIDN'T INHALE\"   Vaping Use   • Vaping Use: Never used   Substance and Sexual Activity   • Alcohol use: No   • Drug use: No   • Sexual activity: Defer       Current Outpatient Medications:   •  aspirin 81 MG EC tablet, Take 81 mg by mouth Daily., Disp: , Rfl:   •  atorvastatin (LIPITOR) 80 MG tablet, Take 80 mg by mouth Daily., Disp: , Rfl: 5  •  benztropine (COGENTIN) 0.5 MG tablet, Take 0.5 mg by mouth Daily., Disp: , Rfl: 2  •  chlorthalidone (HYGROTON) 25 MG tablet, Take 25 mg by mouth Daily., Disp: , Rfl:   •  dicyclomine (BENTYL) 20 MG tablet, Take 1 tablet by mouth 3 (Three) Times a Day As Needed (lower abdominal pain and diarrhea)., Disp: 30 tablet, Rfl: 2  •  hydroCHLOROthiazide (MICROZIDE) 12.5 MG capsule, Take 12.5 mg by mouth Daily., Disp: , Rfl:   •  levothyroxine (SYNTHROID, LEVOTHROID) 25 MCG tablet, Take 25 mcg by mouth Daily., Disp: , Rfl: 3  •  lisinopril " (PRINIVIL,ZESTRIL) 20 MG tablet, Take 40 mg by mouth Daily., Disp: , Rfl:   •  metFORMIN (GLUCOPHAGE) 500 MG tablet, Take 500 mg by mouth 2 (Two) Times a Day With Meals. SPOUSE REPORTS PATIENT TAKES ONE HALF OF A 500MG TABLET, TWICE DAILY, Disp: , Rfl: 3  •  metoprolol tartrate (LOPRESSOR) 100 MG tablet, Take 100 mg by mouth 2 (Two) Times a Day., Disp: , Rfl: 0  •  Paliperidone Palmitate (INVEGA TRINZA) 546 MG/1.75ML suspension IM injection, Inject 546 mg into the appropriate muscle as directed by prescriber Every 3 (Three) Months., Disp: , Rfl:   •  pantoprazole (PROTONIX) 40 MG EC tablet, TAKE 1 TABLET BY MOUTH DAILY HALF AN HOUR BEFORE BREAKFAST, Disp: 30 tablet, Rfl: 6  •  ondansetron ODT (ZOFRAN-ODT) 4 MG disintegrating tablet, Place 1 tablet on the tongue Every 8 (Eight) Hours As Needed for Nausea or Vomiting., Disp: 30 tablet, Rfl: 1     Allergies   Allergen Reactions   • Penicillins Other (See Comments)     Pt is not sure reaction.     The following portions of the patient's history were reviewed and updated as appropriate: allergies, current medications, past family history, past medical history, past social history, past surgical history and problem list.  Objective     Physical Exam  Vitals and nursing note reviewed.   Constitutional:       General: She is not in acute distress.     Appearance: Normal appearance. She is well-developed. She is not diaphoretic.   HENT:      Head: Normocephalic and atraumatic.      Right Ear: Hearing and external ear normal.      Left Ear: Hearing and external ear normal.      Nose: Nose normal.      Mouth/Throat:      Mouth: Mucous membranes are not pale, not dry and not cyanotic.   Eyes:      General: Lids are normal.         Right eye: No discharge.         Left eye: No discharge.      Conjunctiva/sclera: Conjunctivae normal.   Neck:      Trachea: Trachea normal.   Cardiovascular:      Rate and Rhythm: Normal rate and regular rhythm.      Heart sounds: Normal heart  "sounds.   Pulmonary:      Effort: Pulmonary effort is normal. No respiratory distress.      Breath sounds: Normal breath sounds.   Chest:      Chest wall: No tenderness.   Abdominal:      General: Bowel sounds are normal. There is no distension.      Palpations: Abdomen is soft. There is no mass.      Tenderness: There is no abdominal tenderness. There is no guarding or rebound.      Hernia: No hernia is present.   Musculoskeletal:      Cervical back: No edema.   Skin:     General: Skin is warm and dry.      Coloration: Skin is not pale.      Findings: No rash.   Neurological:      Mental Status: She is alert and oriented to person, place, and time.      Cranial Nerves: No cranial nerve deficit.   Psychiatric:         Mood and Affect: Mood normal.         Speech: Speech normal.         Behavior: Behavior is cooperative.       Vitals:    07/08/21 1016   BP: 163/79   Pulse: 66   Resp: 16   Temp: 98.6 °F (37 °C)   Weight: 71.2 kg (157 lb)   Height: 167.6 cm (66\")     Results Review:   I reviewed the patient's new clinical results.    Lab on 06/03/2021   Component Date Value Ref Range Status   • WBC 06/03/2021 8.29  3.40 - 10.80 10*3/mm3 Final   • RBC 06/03/2021 3.84  3.77 - 5.28 10*6/mm3 Final   • Hemoglobin 06/03/2021 12.1  12.0 - 15.9 g/dL Final   • Hematocrit 06/03/2021 37.0  34.0 - 46.6 % Final   • MCV 06/03/2021 96.4  79.0 - 97.0 fL Final   • MCH 06/03/2021 31.5  26.6 - 33.0 pg Final   • MCHC 06/03/2021 32.7  31.5 - 35.7 g/dL Final   • RDW 06/03/2021 13.1  12.3 - 15.4 % Final   • RDW-SD 06/03/2021 46.5  37.0 - 54.0 fl Final   • MPV 06/03/2021 11.1  6.0 - 12.0 fL Final   • Platelets 06/03/2021 232  140 - 450 10*3/mm3 Final   • Glucose 06/03/2021 99  65 - 99 mg/dL Final   • BUN 06/03/2021 8  8 - 23 mg/dL Final   • Creatinine 06/03/2021 0.89  0.57 - 1.00 mg/dL Final   • Sodium 06/03/2021 137  136 - 145 mmol/L Final   • Potassium 06/03/2021 3.9  3.5 - 5.2 mmol/L Final   • Chloride 06/03/2021 99  98 - 107 mmol/L Final "   • CO2 06/03/2021 29.0  22.0 - 29.0 mmol/L Final   • Calcium 06/03/2021 9.5  8.6 - 10.5 mg/dL Final   • Total Protein 06/03/2021 6.7  6.0 - 8.5 g/dL Final   • Albumin 06/03/2021 3.90  3.50 - 5.20 g/dL Final   • ALT (SGPT) 06/03/2021 16  1 - 33 U/L Final   • AST (SGOT) 06/03/2021 22  1 - 32 U/L Final   • Alkaline Phosphatase 06/03/2021 68  39 - 117 U/L Final   • Total Bilirubin 06/03/2021 0.7  0.0 - 1.2 mg/dL Final   • eGFR Non  Amer 06/03/2021 63  >60 mL/min/1.73 Final   • Globulin 06/03/2021 2.8  gm/dL Final   • A/G Ratio 06/03/2021 1.4  g/dL Final   • BUN/Creatinine Ratio 06/03/2021 9.0  7.0 - 25.0 Final   • Anion Gap 06/03/2021 9.0  5.0 - 15.0 mmol/L Final   • IgA 06/03/2021 235  70 - 400 mg/dL Final   • Tissue Transglutaminase IgA 06/03/2021 <2  0 - 3 U/mL Final                                  Negative        0 -  3                                Weak Positive   4 - 10                                Positive           >10   Tissue Transglutaminase (tTG) has been identified   as the endomysial antigen.  Studies have demonstr-   ated that endomysial IgA antibodies have over 99%   specificity for gluten sensitive enteropathy.      CT Abdomen Pelvis Without Contrast     Result Date: 6/3/2021  1. No evidence of bowel obstruction or bowel wall thickening. 2. Bilateral nonobstructing renal stones.  This report was finalized on 6/3/2021 5:37 PM by Jeremi Rodriguez MD.     Dated May 31, 2018 the patient underwent a colonoscopy to the terminal ileum which revealed: Left-sided diverticulosis.  Small 3 mL sessile colon polyps.  2 were removed in the rectum.  Mild patchy erythema within the terminal ileum.  This changes can be at times associated with use of NSAIDs.  Internal hemorrhoids.  Significant erythema in the perineal area.  Terminal ileum, biopsies revealed benign small bowel mucosa with no diagnostic abnormality.  Random colon biopsies revealed benign colonic mucosa with no diagnostic abnormality.  Rectal  polyps, biopsies revealed hyperplastic polyps.  Ascending colon, polyp, biopsy revealed benign colonic mucosa with lymphoid aggregate.     EGD dated 7/26/2018 tiny sliding hiatal hernia.  Erythematous gastritis.  Complete healing of gastric ulcer.  Stomach biopsy greater curve of healed ulcer minimal chronic gastritis.  Negative for H. pylori, metaplasia, dysplasia or malignancy.  Second portion of duodenum biopsy with no pathologic alterations.  Negative for celiac, microorganisms, metaplasia or atypia.     Dated 1/16/2021 total bilirubin 0.6 alkaline phosphatase 64 AST 18 ALT 16 albumin 3.9 hemoglobin 12 hematocrit 37 platelet count 193 vitamin D 1145 TSH 2.57 hemoglobin A1c 5.9    Assessment / Plan      1. Irritable bowel syndrome with diarrhea  Symptoms unchanged. She has been taking Bentyl and Imodium as needed with some improvement. No rectal bleeding. She does eat sugar free candies regularly due to her diabetes. Basic labs unremarkable. Celiac panel negative. CTAP with unremarkable GI tract.  Low FODMAP diet - avoid dairy  Avoid sugar free candies as this can make symptoms worse  Continue Bentyl and Imodium as needed     - dicyclomine (BENTYL) 20 MG tablet; Take 1 tablet by mouth 3 (Three) Times a Day As Needed (lower abdominal pain and diarrhea).  Dispense: 30 tablet; Refill: 2    5/19/2021  Longstanding history of diarrhea with lower abdominal cramping.  She has diarrhea 2-3 times per week with 3-4 episodes per day.  She takes Imodium as needed with no improvement.  There is no history of rectal bleeding.  Recent TSH normal.  Colonoscopy with random colon biopsies unremarkable, no evidence of IBD or microscopic colitis. The patient is on metformin, and eats ice cream regularly. Suspect irritable bowel syndrome with diarrhea.  Low FODMAP diet - avoid dairy  Imodium 2 mg 1/2-1 caplet 1-2 times per day as needed for >3 episodes of diarrhea.  Bentyl 20 mg 1 po 3 times per day as needed for lower abdominal  cramping and diarrhea.  Labs  CTAP to rule out solid organ pathology.    2. Gastroparesis  Epigastric pain and nausea doing okay. Needs refills of Zofran.  Celiac panel negative.  CTAP with unremarkable GI tract.  Advised to eat a softer diet with 4-5 very small meals throughout the day.  Zofran as needed    - ondansetron ODT (ZOFRAN-ODT) 4 MG disintegrating tablet; Place 1 tablet on the tongue Every 8 (Eight) Hours As Needed for Nausea or Vomiting.  Dispense: 30 tablet; Refill: 1    5/19/2021  Longstanding history of epigastric pain and nausea.  Currently she is doing well and has not been having any epigastric pain or nausea.  EGD in 2017 with changes consistent with delayed gastric emptying.  The patient is a diabetic and is on psychiatric medications, which can contribute to gastroparesis.  Will monitor for now as patient is not symptomatic.    3. Gastroesophageal reflux disease with esophagitis without hemorrhage  Reflux reasonably controlled with PPI, but severe if she tries to decrease or stop. Continue same for now. CTAP unremarkable.  Anti-reflux measures.    5/19/2021  Longstanding history of reflux.  Reasonable control with pantoprazole daily.  Patient has tried to decrease/stop PPI therapy in the past and reflux was severe.  EGD in 2018 with no Guzman's noted. Patient has a history of gastric ulcer per EGD in January 2018. Complete healing of ulcer noted on follow up EGD. Recent labs normal, no anemia.  Antireflux measures.  Continue pantoprazole 40 mg daily for now.    4. Encounter for screening for malignant neoplasm of colon  Her last colonoscopy was May 2018 with 2 small sessile polyps removed, hyperplastic.  There is no family history of colon cancer.  Colonoscopy for colon cancer screening in 10 years, 2028.    Patient Instructions   Antireflux measures: Avoid fried, fatty foods, alcohol, chocolate, coffee, tea,  soft drinks, peppermint and spearmint, spicy foods, tomatoes and tomato based foods,  onion based foods, and smoking. Other antireflux measures include weight reduction if overweight, avoiding tight clothing around the abdomen, elevating the head of the bed 6 inches with blocks under the head board, and don't drink or eat before going to bed and avoid lying down immediately after meals.  Pantoprazole 40 mg 1 po daily in the am 30 minutes before breakfast.  Recommended to take Levothyroxine first in the am upon waking, wait 30 minutes, then take Pantoprazole 40 mg, wait 30 minutes, then eat breakfast and take other medications.  Zofran 4 mg 1 po every 8 hours as needed for nausea.   The patient should eat 4-5 very small meals throughout the day. Avoid large meals.   It is recommended to eat a softer diet. Meats are best consumed ground. Fruits and vegetables are best consumed cooked or steamed and then mashed.   Low FODMAP diet - avoid dairy  Imodium 2 mg 1/2-1 po 1-2 times per day as needed for diarrhea.   Bentyl 20 mg 1 po 3 times per day as needed for lower abdominal cramping and diarrhea.   Avoid sugar free candies.  Colonoscopy for screening in 10 years, 2028.  Follow up: 6 months or sooner if needed    Low-FODMAP Eating Plan    FODMAPs (fermentable oligosaccharides, disaccharides, monosaccharides, and polyols) are sugars that are hard for some people to digest. A low-FODMAP eating plan may help some people who have bowel (intestinal) diseases to manage their symptoms.  This meal plan can be complicated to follow. Work with a diet and nutrition specialist (dietitian) to make a low-FODMAP eating plan that is right for you. A dietitian can make sure that you get enough nutrition from this diet.  What are tips for following this plan?  Reading food labels  · Check labels for hidden FODMAPs such as:  ? High-fructose syrup.  ? Honey.  ? Agave.  ? Natural fruit flavors.  ? Onion or garlic powder.  · Choose low-FODMAP foods that contain 3-4 grams of fiber per serving.  · Check food labels for serving  sizes. Eat only one serving at a time to make sure FODMAP levels stay low.  Meal planning  · Follow a low-FODMAP eating plan for up to 6 weeks, or as told by your health care provider or dietitian.  · To follow the eating plan:  1. Eliminate high-FODMAP foods from your diet completely.  2. Gradually reintroduce high-FODMAP foods into your diet one at a time. Most people should wait a few days after introducing one high-FODMAP food before they introduce the next high-FODMAP food. Your dietitian can recommend how quickly you may reintroduce foods.  3. Keep a daily record of what you eat and drink, and make note of any symptoms that you have after eating.  4. Review your daily record with a dietitian regularly. Your dietitian can help you identify which foods you can eat and which foods you should avoid.  General tips  · Drink enough fluid each day to keep your urine pale yellow.  · Avoid processed foods. These often have added sugar and may be high in FODMAPs.  · Avoid most dairy products, whole grains, and sweeteners.  · Work with a dietitian to make sure you get enough fiber in your diet.  Recommended foods  Grains  · Gluten-free grains, such as rice, oats, buckwheat, quinoa, corn, polenta, and millet. Gluten-free pasta, bread, or cereal. Rice noodles. Corn tortillas.  Vegetables  · Eggplant, zucchini, cucumber, peppers, green beans, Lincoln sprouts, bean sprouts, lettuce, arugula, kale, Swiss chard, spinach, mi greens, bok lorenzo, summer squash, potato, and tomato. Limited amounts of corn, carrot, and sweet potato. Green parts of scallions.  Fruits  · Bananas, oranges, bharti, limes, blueberries, raspberries, strawberries, grapes, cantaloupe, honeydew melon, kiwi, papaya, passion fruit, and pineapple. Limited amounts of dried cranberries, banana chips, and shredded coconut.  Dairy  · Lactose-free milk, yogurt, and kefir. Lactose-free cottage cheese and ice cream. Non-dairy milks, such as almond, coconut, hemp,  "and rice milk. Yogurts made of non-dairy milks. Limited amounts of goat cheese, brie, mozzarella, parmesan, swiss, and other hard cheeses.  Meats and other protein foods  · Unseasoned beef, pork, poultry, or fish. Eggs. Villa. Tofu (firm) and tempeh. Limited amounts of nuts and seeds, such as almonds, walnuts, brazil nuts, pecans, peanuts, pumpkin seeds, shira seeds, and sunflower seeds.  Fats and oils  · Butter-free spreads. Vegetable oils, such as olive, canola, and sunflower oil.  Seasoning and other foods  · Artificial sweeteners with names that do not end in \"ol\" such as aspartame, saccharine, and stevia. Maple syrup, white table sugar, raw sugar, brown sugar, and molasses. Fresh basil, coriander, parsley, rosemary, and thyme.  Beverages  · Water and mineral water. Sugar-sweetened soft drinks. Small amounts of orange juice or cranberry juice. Black and green tea. Most dry marvin. Coffee.  This may not be a complete list of low-FODMAP foods. Talk with your dietitian for more information.  Foods to avoid  Grains  · Wheat, including kamut, durum, and semolina. Barley and bulgur. Couscous. Wheat-based cereals. Wheat noodles, bread, crackers, and pastries.  Vegetables  · Chicory root, artichoke, asparagus, cabbage, snow peas, sugar snap peas, mushrooms, and cauliflower. Onions, garlic, leeks, and the white part of scallions.  Fruits  · Fresh, dried, and juiced forms of apple, pear, watermelon, peach, plum, cherries, apricots, blackberries, boysenberries, figs, nectarines, and césar. Avocado.  Dairy  · Milk, yogurt, ice cream, and soft cheese. Cream and sour cream. Milk-based sauces. Custard.  Meats and other protein foods  · Fried or fatty meat. Sausage. Cashews and pistachios. Soybeans, baked beans, black beans, chickpeas, kidney beans, eris beans, navy beans, lentils, and split peas.  Seasoning and other foods  · Any sugar-free gum or candy. Foods that contain artificial sweeteners such as sorbitol, mannitol, " isomalt, or xylitol. Foods that contain honey, high-fructose corn syrup, or agave. Bouillon, vegetable stock, beef stock, and chicken stock. Garlic and onion powder. Condiments made with onion, such as hummus, chutney, pickles, relish, salad dressing, and salsa. Tomato paste.  Beverages  · Chicory-based drinks. Coffee substitutes. Chamomile tea. Fennel tea. Sweet or fortified marvni such as port or stephanie. Diet soft drinks made with isomalt, mannitol, maltitol, sorbitol, or xylitol. Apple, pear, and césar juice. Juices with high-fructose corn syrup.  This may not be a complete list of high-FODMAP foods. Talk with your dietitian to discuss what dietary choices are best for you.   Summary  · A low-FODMAP eating plan is a short-term diet that eliminates FODMAPs from your diet to help ease symptoms of certain bowel diseases.  · The eating plan usually lasts up to 6 weeks. After that, high-FODMAP foods are restarted gradually, one at a time, so you can find out which may be causing symptoms.  · A low-FODMAP eating plan can be complicated. It is best to work with a dietitian who has experience with this type of plan.  This information is not intended to replace advice given to you by your health care provider. Make sure you discuss any questions you have with your health care provider.  Document Revised: 11/30/2018 Document Reviewed: 08/14/2018  [employer] Patient Education © 2021 [employer]    Julisa Rabago, APRN  7/8/2021    Please note that portions of this note may have been completed with a voice recognition program. Efforts were made to edit the dictations, but occasionally words are mistranscribed.

## 2022-01-24 ENCOUNTER — TRANSCRIBE ORDERS (OUTPATIENT)
Dept: ADMINISTRATIVE | Facility: HOSPITAL | Age: 67
End: 2022-01-24

## 2022-01-24 DIAGNOSIS — Z12.31 VISIT FOR SCREENING MAMMOGRAM: Primary | ICD-10-CM

## 2022-03-28 ENCOUNTER — HOSPITAL ENCOUNTER (OUTPATIENT)
Dept: MAMMOGRAPHY | Facility: HOSPITAL | Age: 67
Discharge: HOME | End: 2022-03-28
Admitting: INTERNAL MEDICINE

## 2022-03-28 DIAGNOSIS — Z12.31 VISIT FOR SCREENING MAMMOGRAM: ICD-10-CM

## 2022-03-28 PROCEDURE — 77063 BREAST TOMOSYNTHESIS BI: CPT

## 2022-03-28 PROCEDURE — 77067 SCR MAMMO BI INCL CAD: CPT

## 2022-09-06 ENCOUNTER — TRANSCRIBE ORDERS (OUTPATIENT)
Dept: CARDIAC REHAB | Facility: HOSPITAL | Age: 67
End: 2022-09-06

## 2022-09-06 DIAGNOSIS — Z95.1 S/P CABG (CORONARY ARTERY BYPASS GRAFT): Primary | ICD-10-CM

## 2022-09-29 ENCOUNTER — TREATMENT (OUTPATIENT)
Dept: CARDIAC REHAB | Facility: HOSPITAL | Age: 67
End: 2022-09-29

## 2022-09-29 DIAGNOSIS — Z95.1 STATUS POST CORONARY ARTERY BYPASS GRAFT: Primary | ICD-10-CM

## 2022-09-29 PROCEDURE — 93798 PHYS/QHP OP CAR RHAB W/ECG: CPT

## 2022-09-29 NOTE — PROGRESS NOTES
Pt was seen today in [facility] for a Phase II visit. Vital signs and session notes recorded in [employer] and will be scanned into Epic by HIM.

## 2022-10-06 ENCOUNTER — TREATMENT (OUTPATIENT)
Dept: CARDIAC REHAB | Facility: HOSPITAL | Age: 67
End: 2022-10-06

## 2022-10-06 DIAGNOSIS — Z95.1 STATUS POST CORONARY ARTERY BYPASS GRAFT: Primary | ICD-10-CM

## 2022-10-06 PROCEDURE — 93798 PHYS/QHP OP CAR RHAB W/ECG: CPT

## 2022-10-06 NOTE — PROGRESS NOTES
Pt was seen today in [facility] for a Phase II visit. Vital signs and session notes recorded in [employer] and will be scanned into Epic by HIM.

## 2022-10-11 ENCOUNTER — APPOINTMENT (OUTPATIENT)
Dept: CARDIAC REHAB | Facility: HOSPITAL | Age: 67
End: 2022-10-11

## 2022-10-13 ENCOUNTER — APPOINTMENT (OUTPATIENT)
Dept: CARDIAC REHAB | Facility: HOSPITAL | Age: 67
End: 2022-10-13

## 2022-10-18 ENCOUNTER — TREATMENT (OUTPATIENT)
Dept: CARDIAC REHAB | Facility: HOSPITAL | Age: 67
End: 2022-10-18

## 2022-10-18 DIAGNOSIS — Z95.1 STATUS POST CORONARY ARTERY BYPASS GRAFT: Primary | ICD-10-CM

## 2022-10-18 PROCEDURE — 93798 PHYS/QHP OP CAR RHAB W/ECG: CPT

## 2022-10-18 NOTE — PROGRESS NOTES
Pt was seen today in [facility] for a Phase II visit. Vital signs and session notes recorded in [employer] and will be scanned into Epic by HIM.

## 2023-01-17 ENCOUNTER — OFFICE VISIT (OUTPATIENT)
Dept: CARDIOLOGY | Facility: CLINIC | Age: 68
End: 2023-01-17
Payer: MEDICARE

## 2023-01-17 VITALS
WEIGHT: 146 LBS | DIASTOLIC BLOOD PRESSURE: 72 MMHG | OXYGEN SATURATION: 99 % | HEART RATE: 90 BPM | RESPIRATION RATE: 8 BRPM | SYSTOLIC BLOOD PRESSURE: 122 MMHG | HEIGHT: 66 IN | BODY MASS INDEX: 23.46 KG/M2

## 2023-01-17 DIAGNOSIS — I10 ESSENTIAL HYPERTENSION: ICD-10-CM

## 2023-01-17 DIAGNOSIS — E11.00 TYPE 2 DIABETES MELLITUS WITH HYPEROSMOLARITY WITHOUT COMA, WITHOUT LONG-TERM CURRENT USE OF INSULIN: ICD-10-CM

## 2023-01-17 DIAGNOSIS — I25.10 CORONARY ARTERY DISEASE INVOLVING NATIVE CORONARY ARTERY OF NATIVE HEART WITHOUT ANGINA PECTORIS: Primary | ICD-10-CM

## 2023-01-17 DIAGNOSIS — E78.5 DYSLIPIDEMIA: ICD-10-CM

## 2023-01-17 PROCEDURE — 93000 ELECTROCARDIOGRAM COMPLETE: CPT | Performed by: INTERNAL MEDICINE

## 2023-01-17 PROCEDURE — 99204 OFFICE O/P NEW MOD 45 MIN: CPT | Performed by: INTERNAL MEDICINE

## 2023-01-17 RX ORDER — CLOPIDOGREL BISULFATE 75 MG/1
75 TABLET ORAL DAILY
COMMUNITY
Start: 2022-12-22

## 2023-01-27 ENCOUNTER — TRANSCRIBE ORDERS (OUTPATIENT)
Dept: ADMINISTRATIVE | Facility: HOSPITAL | Age: 68
End: 2023-01-27
Payer: MEDICARE

## 2023-01-27 DIAGNOSIS — Z12.31 ENCOUNTER FOR SCREENING MAMMOGRAM FOR MALIGNANT NEOPLASM OF BREAST: Primary | ICD-10-CM

## 2023-12-28 ENCOUNTER — TRANSCRIBE ORDERS (OUTPATIENT)
Dept: ADMINISTRATIVE | Facility: HOSPITAL | Age: 68
End: 2023-12-28
Payer: MEDICARE

## 2023-12-28 DIAGNOSIS — Z12.31 SCREENING MAMMOGRAM FOR BREAST CANCER: Primary | ICD-10-CM

## 2024-05-08 ENCOUNTER — APPOINTMENT (OUTPATIENT)
Dept: GENERAL RADIOLOGY | Facility: HOSPITAL | Age: 69
DRG: 522 | End: 2024-05-08
Payer: MEDICARE

## 2024-05-08 ENCOUNTER — HOSPITAL ENCOUNTER (INPATIENT)
Facility: HOSPITAL | Age: 69
LOS: 4 days | Discharge: HOME | DRG: 522 | End: 2024-05-12
Attending: EMERGENCY MEDICINE | Admitting: INTERNAL MEDICINE
Payer: MEDICARE

## 2024-05-08 DIAGNOSIS — S72.002A LEFT DISPLACED FEMORAL NECK FRACTURE: Primary | ICD-10-CM

## 2024-05-08 DIAGNOSIS — S72.002A CLOSED FRACTURE OF NECK OF LEFT FEMUR, INITIAL ENCOUNTER: ICD-10-CM

## 2024-05-08 DIAGNOSIS — S72.009A HIP FRACTURE: ICD-10-CM

## 2024-05-08 PROBLEM — I25.10 CAD (CORONARY ARTERY DISEASE): Status: ACTIVE | Noted: 2024-05-08

## 2024-05-08 LAB
ALBUMIN SERPL-MCNC: 4.2 G/DL (ref 3.5–5.2)
ALBUMIN/GLOB SERPL: 1.6 G/DL
ALP SERPL-CCNC: 101 U/L (ref 39–117)
ALT SERPL W P-5'-P-CCNC: 17 U/L (ref 1–33)
ANION GAP SERPL CALCULATED.3IONS-SCNC: 10.8 MMOL/L (ref 5–15)
AST SERPL-CCNC: 22 U/L (ref 1–32)
BASOPHILS # BLD AUTO: 0.03 10*3/MM3 (ref 0–0.2)
BASOPHILS NFR BLD AUTO: 0.3 % (ref 0–1.5)
BILIRUB SERPL-MCNC: 0.6 MG/DL (ref 0–1.2)
BUN SERPL-MCNC: 8 MG/DL (ref 8–23)
BUN/CREAT SERPL: 9.5 (ref 7–25)
CALCIUM SPEC-SCNC: 9 MG/DL (ref 8.6–10.5)
CHLORIDE SERPL-SCNC: 90 MMOL/L (ref 98–107)
CO2 SERPL-SCNC: 27.2 MMOL/L (ref 22–29)
CREAT SERPL-MCNC: 0.84 MG/DL (ref 0.57–1)
DEPRECATED RDW RBC AUTO: 46.1 FL (ref 37–54)
EGFRCR SERPLBLD CKD-EPI 2021: 74.4 ML/MIN/1.73
EOSINOPHIL # BLD AUTO: 0.01 10*3/MM3 (ref 0–0.4)
EOSINOPHIL NFR BLD AUTO: 0.1 % (ref 0.3–6.2)
ERYTHROCYTE [DISTWIDTH] IN BLOOD BY AUTOMATED COUNT: 14.3 % (ref 12.3–15.4)
GLOBULIN UR ELPH-MCNC: 2.7 GM/DL
GLUCOSE SERPL-MCNC: 109 MG/DL (ref 65–99)
HCT VFR BLD AUTO: 37.1 % (ref 34–46.6)
HGB BLD-MCNC: 12.6 G/DL (ref 12–15.9)
IMM GRANULOCYTES # BLD AUTO: 0.06 10*3/MM3 (ref 0–0.05)
IMM GRANULOCYTES NFR BLD AUTO: 0.5 % (ref 0–0.5)
INR PPP: 1.02 (ref 0.9–1.1)
LYMPHOCYTES # BLD AUTO: 0.96 10*3/MM3 (ref 0.7–3.1)
LYMPHOCYTES NFR BLD AUTO: 8.8 % (ref 19.6–45.3)
MCH RBC QN AUTO: 29.7 PG (ref 26.6–33)
MCHC RBC AUTO-ENTMCNC: 34 G/DL (ref 31.5–35.7)
MCV RBC AUTO: 87.5 FL (ref 79–97)
MONOCYTES # BLD AUTO: 0.65 10*3/MM3 (ref 0.1–0.9)
MONOCYTES NFR BLD AUTO: 5.9 % (ref 5–12)
NEUTROPHILS NFR BLD AUTO: 84.4 % (ref 42.7–76)
NEUTROPHILS NFR BLD AUTO: 9.25 10*3/MM3 (ref 1.7–7)
NRBC BLD AUTO-RTO: 0 /100 WBC (ref 0–0.2)
PLATELET # BLD AUTO: 183 10*3/MM3 (ref 140–450)
PMV BLD AUTO: 9.7 FL (ref 6–12)
POTASSIUM SERPL-SCNC: 4.1 MMOL/L (ref 3.5–5.2)
PROT SERPL-MCNC: 6.9 G/DL (ref 6–8.5)
PROTHROMBIN TIME: 13.9 SECONDS (ref 12.3–15.1)
RBC # BLD AUTO: 4.24 10*6/MM3 (ref 3.77–5.28)
SODIUM SERPL-SCNC: 128 MMOL/L (ref 136–145)
T4 FREE SERPL-MCNC: 1.4 NG/DL (ref 0.93–1.7)
WBC NRBC COR # BLD AUTO: 10.96 10*3/MM3 (ref 3.4–10.8)

## 2024-05-08 PROCEDURE — 25010000002 ONDANSETRON PER 1 MG: Performed by: EMERGENCY MEDICINE

## 2024-05-08 PROCEDURE — 85025 COMPLETE CBC W/AUTO DIFF WBC: CPT | Performed by: EMERGENCY MEDICINE

## 2024-05-08 PROCEDURE — 73552 X-RAY EXAM OF FEMUR 2/>: CPT

## 2024-05-08 PROCEDURE — 85610 PROTHROMBIN TIME: CPT | Performed by: EMERGENCY MEDICINE

## 2024-05-08 PROCEDURE — 84439 ASSAY OF FREE THYROXINE: CPT | Performed by: INTERNAL MEDICINE

## 2024-05-08 PROCEDURE — 71045 X-RAY EXAM CHEST 1 VIEW: CPT

## 2024-05-08 PROCEDURE — 93005 ELECTROCARDIOGRAM TRACING: CPT | Performed by: EMERGENCY MEDICINE

## 2024-05-08 PROCEDURE — 99285 EMERGENCY DEPT VISIT HI MDM: CPT

## 2024-05-08 PROCEDURE — 25010000002 MORPHINE PER 10 MG: Performed by: INTERNAL MEDICINE

## 2024-05-08 PROCEDURE — 25810000003 SODIUM CHLORIDE 0.9 % SOLUTION: Performed by: INTERNAL MEDICINE

## 2024-05-08 PROCEDURE — 25010000002 MORPHINE PER 10 MG: Performed by: EMERGENCY MEDICINE

## 2024-05-08 PROCEDURE — 80053 COMPREHEN METABOLIC PANEL: CPT | Performed by: EMERGENCY MEDICINE

## 2024-05-08 PROCEDURE — 73502 X-RAY EXAM HIP UNI 2-3 VIEWS: CPT

## 2024-05-08 RX ORDER — ATORVASTATIN CALCIUM 80 MG/1
80 TABLET, FILM COATED ORAL DAILY
Status: DISCONTINUED | OUTPATIENT
Start: 2024-05-08 | End: 2024-05-12 | Stop reason: HOSPADM

## 2024-05-08 RX ORDER — HYDROCODONE BITARTRATE AND ACETAMINOPHEN 5; 325 MG/1; MG/1
1 TABLET ORAL EVERY 6 HOURS PRN
Status: DISCONTINUED | OUTPATIENT
Start: 2024-05-08 | End: 2024-05-12 | Stop reason: HOSPADM

## 2024-05-08 RX ORDER — SODIUM CHLORIDE 9 MG/ML
75 INJECTION, SOLUTION INTRAVENOUS CONTINUOUS
Status: DISCONTINUED | OUTPATIENT
Start: 2024-05-08 | End: 2024-05-12 | Stop reason: HOSPADM

## 2024-05-08 RX ORDER — MORPHINE SULFATE 2 MG/ML
2 INJECTION, SOLUTION INTRAMUSCULAR; INTRAVENOUS
Status: DISCONTINUED | OUTPATIENT
Start: 2024-05-08 | End: 2024-05-12 | Stop reason: HOSPADM

## 2024-05-08 RX ORDER — ONDANSETRON 2 MG/ML
4 INJECTION INTRAMUSCULAR; INTRAVENOUS ONCE
Status: COMPLETED | OUTPATIENT
Start: 2024-05-08 | End: 2024-05-08

## 2024-05-08 RX ORDER — LEVOTHYROXINE SODIUM 0.03 MG/1
25 TABLET ORAL DAILY
Status: DISCONTINUED | OUTPATIENT
Start: 2024-05-08 | End: 2024-05-08

## 2024-05-08 RX ORDER — SODIUM CHLORIDE 0.9 % (FLUSH) 0.9 %
10 SYRINGE (ML) INJECTION AS NEEDED
Status: DISCONTINUED | OUTPATIENT
Start: 2024-05-08 | End: 2024-05-12 | Stop reason: HOSPADM

## 2024-05-08 RX ORDER — LEVOTHYROXINE SODIUM 0.05 MG/1
25 TABLET ORAL DAILY
Status: DISCONTINUED | OUTPATIENT
Start: 2024-05-09 | End: 2024-05-12 | Stop reason: HOSPADM

## 2024-05-08 RX ORDER — LISINOPRIL 10 MG/1
40 TABLET ORAL
Status: DISCONTINUED | OUTPATIENT
Start: 2024-05-08 | End: 2024-05-12 | Stop reason: HOSPADM

## 2024-05-08 RX ORDER — CLOPIDOGREL BISULFATE 75 MG/1
75 TABLET ORAL DAILY
Status: DISCONTINUED | OUTPATIENT
Start: 2024-05-08 | End: 2024-05-12 | Stop reason: HOSPADM

## 2024-05-08 RX ORDER — ASPIRIN 81 MG/1
81 TABLET ORAL DAILY
Status: DISCONTINUED | OUTPATIENT
Start: 2024-05-08 | End: 2024-05-12 | Stop reason: HOSPADM

## 2024-05-08 RX ORDER — PANTOPRAZOLE SODIUM 40 MG/1
40 TABLET, DELAYED RELEASE ORAL DAILY
Status: DISCONTINUED | OUTPATIENT
Start: 2024-05-08 | End: 2024-05-12 | Stop reason: HOSPADM

## 2024-05-08 RX ADMIN — ATORVASTATIN CALCIUM 80 MG: 80 TABLET, FILM COATED ORAL at 20:06

## 2024-05-08 RX ADMIN — MORPHINE SULFATE 4 MG: 4 INJECTION, SOLUTION INTRAMUSCULAR; INTRAVENOUS at 16:38

## 2024-05-08 RX ADMIN — ONDANSETRON 4 MG: 2 INJECTION INTRAMUSCULAR; INTRAVENOUS at 18:43

## 2024-05-08 RX ADMIN — HYDROCODONE BITARTRATE AND ACETAMINOPHEN 1 TABLET: 5; 325 TABLET ORAL at 20:33

## 2024-05-08 RX ADMIN — METOPROLOL TARTRATE 50 MG: 25 TABLET, FILM COATED ORAL at 20:06

## 2024-05-08 RX ADMIN — PANTOPRAZOLE SODIUM 40 MG: 40 TABLET, DELAYED RELEASE ORAL at 20:06

## 2024-05-08 RX ADMIN — ASPIRIN 81 MG: 81 TABLET, COATED ORAL at 20:06

## 2024-05-08 RX ADMIN — ONDANSETRON 4 MG: 2 INJECTION INTRAMUSCULAR; INTRAVENOUS at 16:37

## 2024-05-08 RX ADMIN — CLOPIDOGREL BISULFATE 75 MG: 75 TABLET ORAL at 20:06

## 2024-05-08 RX ADMIN — SODIUM CHLORIDE 50 ML/HR: 9 INJECTION, SOLUTION INTRAVENOUS at 20:06

## 2024-05-08 RX ADMIN — LISINOPRIL 40 MG: 10 TABLET ORAL at 20:06

## 2024-05-08 RX ADMIN — MORPHINE SULFATE 2 MG: 2 INJECTION, SOLUTION INTRAMUSCULAR; INTRAVENOUS at 22:15

## 2024-05-08 NOTE — ED PROVIDER NOTES
EMERGENCY DEPARTMENT ENCOUNTER    Pt Name: Trang Maynard  MRN: [id number]  Pt :   1955  Room Number:  15/15  Date of encounter:  2024  PCP: Rod Vance MD  ED Provider: Roger Simental MD    Historian: Patient      HPI:  Chief Complaint   Patient presents with    Fall     EMS reports ground level fall about 1 hour ago. Pt does not take blood thinner and denies any LOC. C/o left hip pain           Context: Trang Maynard is a 69 y.o. female who presents to the ED c/o left hip pain, the patient tripped while walking on the couch, landing on her left hip.  She reports significant pain in the lower left extremity, denies head injury, neck pain.  Denies blood thinners.  Unable to bear weight on the left lower extremity due to pain      PAST MEDICAL HISTORY  Past Medical History:   Diagnosis Date    Anxiety     Arthritis     CAD (coronary artery disease)     stent     Chronic kidney disease     SEES DR. MIRANDA    Constipation     Depression     Diabetes mellitus     Diarrhea     Dysphagia     SPOUSE REPORTS VERY MILD.    Elevated cholesterol     Gastric ulcer     GERD (gastroesophageal reflux disease)     Heart disease     History of chest pain     SPOUSE REPORTS APPROXIMATLEY FEW MONTHS AGO AND THAT PATIENT HAD WORK UP WITH DR MAGANA.  STATES HAD NUCLEAR STRESS AND IT WAS WNL.      Hyperlipidemia     Hypertension     Hypokalemia     Kidney stone     Low sodium levels     Psychosis     PVD (peripheral vascular disease)     Schizophrenia     SPOUSE REPORTED    Seizures 2016    ONLY HAD ONE EPISODE.  STATED SIDE EFFECTS OF MEDICATION    Sleep apnea     SPOUSE REPORTS PATIENT WAS NOT ABLE TO TOLERATE THE CPAP.      Thyroid condition     TIA (transient ischemic attack)     .  SPOUSE REPORTS NO RESIDUAL PROBLEMS.    Tremors of nervous system     Poss. r/t medications pt is on.    Uterine fibroid     Vitamin B12 deficiency          PAST SURGICAL HISTORY  Past Surgical  "History:   Procedure Laterality Date    CARDIAC CATHETERIZATION      SPOUSE REPORTS  AND  (REPORTS A TOTAL OF 4 STENTS WERE PLACED)    CARDIAC SURGERY      SPOUSE REPORTS 2 STENTS PLACED IN  AND 2 STENTS IN     COLONOSCOPY N/A 2018    Procedure: COLONOSCOPY with cold biopsy polypectomies and biopsies;  Surgeon: Servando Rooney MD;  Location: [facility] ENDOSCOPY;  Service: Gastroenterology    CYST REMOVAL      SPOUSE REPORTS REMOVED FROM NECK    ENDOSCOPY N/A 2017    Procedure: ESOPHAGOGASTRODUODENOSCOPY WITH BIOPSIES;  Surgeon: Servando Rooney MD;  Location: [facility] ENDOSCOPY;  Service:     ENDOSCOPY N/A 2018    Procedure: ESOPHAGOGASTRODUODENOSCOPY with biopsies;  Surgeon: Servando Rooney MD;  Location: [facility] ENDOSCOPY;  Service:     ENDOSCOPY      ENDOSCOPY N/A 2018    Procedure: ESOPHAGOGASTRODUODENOSCOPY WITH BIOPSIES;  Surgeon: Servando Rooney MD;  Location: [facility] ENDOSCOPY;  Service: Gastroenterology    OTHER SURGICAL HISTORY      SPOUSE REPORTS PATIENT ATTEMPTED COLONOSCOPY BEFORE BUT PREP WAS NOT COMPLETE    TUBAL ABDOMINAL LIGATION           FAMILY HISTORY  Family History   Problem Relation Age of Onset    Heart disease Mother     Hypertension Mother     Alcohol abuse Father     Colon cancer Neg Hx          SOCIAL HISTORY  Social History     Socioeconomic History    Marital status:    Tobacco Use    Smoking status: Former     Current packs/day: 0.00     Average packs/day: 0.3 packs/day for 5.0 years (1.3 ttl pk-yrs)     Types: Cigarettes     Start date: 1987     Quit date: 1992     Years since quittin.6    Smokeless tobacco: Never    Tobacco comments:     SPOUSE REPORTS \"SHE DIDN'T INHALE\"   Vaping Use    Vaping status: Never Used   Substance and Sexual Activity    Alcohol use: No    Drug use: No    Sexual activity: Defer         ALLERGIES  Penicillins        REVIEW OF SYSTEMS  Review of Systems   Constitutional:  Negative for chills and fever.   HENT:  " Negative for sore throat and trouble swallowing.    Eyes:  Negative for pain and redness.   Respiratory:  Negative for cough and shortness of breath.    Cardiovascular:  Negative for chest pain and leg swelling.   Gastrointestinal:  Negative for abdominal pain, nausea and vomiting.   Genitourinary:  Negative for dysuria and urgency.   Musculoskeletal:  Negative for back pain and neck pain.        Left hip pain   Skin:  Negative for rash and wound.   Neurological:  Negative for dizziness and weakness.        All systems reviewed and negative except for those discussed in HPI.       PHYSICAL EXAM    I have reviewed the triage vital signs and nursing notes.    ED Triage Vitals [05/08/24 1551]   Temp Heart Rate Resp BP SpO2   97.9 °F (36.6 °C) 89 22 (!) 145/109 100 %      Temp src Heart Rate Source Patient Position BP Location FiO2 (%)   -- -- -- -- --       Physical Exam  Constitutional:       Appearance: Normal appearance. She is not ill-appearing.   HENT:      Head: Normocephalic and atraumatic.      Right Ear: External ear normal.      Left Ear: External ear normal.      Nose: Nose normal.      Mouth/Throat:      Mouth: Mucous membranes are moist.      Pharynx: Oropharynx is clear.   Eyes:      Extraocular Movements: Extraocular movements intact.      Conjunctiva/sclera: Conjunctivae normal.      Pupils: Pupils are equal, round, and reactive to light.   Cardiovascular:      Rate and Rhythm: Normal rate and regular rhythm.      Pulses:           Radial pulses are 2+ on the right side and 2+ on the left side.   Pulmonary:      Effort: Pulmonary effort is normal.      Breath sounds: Normal breath sounds.   Abdominal:      General: There is no distension.      Palpations: Abdomen is soft.      Tenderness: There is no abdominal tenderness.   Musculoskeletal:         General: No tenderness or deformity. Normal range of motion.      Cervical back: Normal range of motion and neck supple.      Left hip: Deformity, tenderness  and bony tenderness present.      Right lower leg: No edema.      Left lower leg: No edema.   Skin:     General: Skin is warm and dry.      Capillary Refill: Capillary refill takes less than 2 seconds.   Neurological:      General: No focal deficit present.      Mental Status: She is alert and oriented to person, place, and time.      Motor: Tremor present.            LAB RESULTS  Recent Results (from the past 24 hour(s))   Protime-INR    Collection Time: 05/08/24  4:46 PM    Specimen: Blood   Result Value Ref Range    Protime 13.9 12.3 - 15.1 Seconds    INR 1.02 0.90 - 1.10   CBC Auto Differential    Collection Time: 05/08/24  4:46 PM    Specimen: Blood   Result Value Ref Range    WBC 10.96 (H) 3.40 - 10.80 10*3/mm3    RBC 4.24 3.77 - 5.28 10*6/mm3    Hemoglobin 12.6 12.0 - 15.9 g/dL    Hematocrit 37.1 34.0 - 46.6 %    MCV 87.5 79.0 - 97.0 fL    MCH 29.7 26.6 - 33.0 pg    MCHC 34.0 31.5 - 35.7 g/dL    RDW 14.3 12.3 - 15.4 %    RDW-SD 46.1 37.0 - 54.0 fl    MPV 9.7 6.0 - 12.0 fL    Platelets 183 140 - 450 10*3/mm3    Neutrophil % 84.4 (H) 42.7 - 76.0 %    Lymphocyte % 8.8 (L) 19.6 - 45.3 %    Monocyte % 5.9 5.0 - 12.0 %    Eosinophil % 0.1 (L) 0.3 - 6.2 %    Basophil % 0.3 0.0 - 1.5 %    Immature Grans % 0.5 0.0 - 0.5 %    Neutrophils, Absolute 9.25 (H) 1.70 - 7.00 10*3/mm3    Lymphocytes, Absolute 0.96 0.70 - 3.10 10*3/mm3    Monocytes, Absolute 0.65 0.10 - 0.90 10*3/mm3    Eosinophils, Absolute 0.01 0.00 - 0.40 10*3/mm3    Basophils, Absolute 0.03 0.00 - 0.20 10*3/mm3    Immature Grans, Absolute 0.06 (H) 0.00 - 0.05 10*3/mm3    nRBC 0.0 0.0 - 0.2 /100 WBC       If labs were ordered, I independently reviewed the results and considered them in treating the patient.        RADIOLOGY  XR Femur 2 View Left    Result Date: 5/8/2024  PROCEDURE: XR FEMUR 2 VW LEFT-  HISTORY: fall, pain, deformity  FINDINGS:  Two views show, fracture of the left femoral neck.  There is moderate displacement.  The joint spaces appear  normal.       Left femoral neck fracture as above.    This report was signed and finalized on 5/8/2024 4:59 PM by Jeremi Rodriguez MD.      XR Hip With or Without Pelvis 2 - 3 View Left    Result Date: 5/8/2024  PROCEDURE: XR HIP W OR WO PELVIS 2-3 VIEW LEFT-  HISTORY: Fall, pain, deformity  FINDINGS:  Two views show fracture of the left femoral neck.  There is moderate displacement. It is difficult to assess whether the fracture subcapital or mid cervical.  The joint spaces appear normal.       Moderately displaced femoral neck fracture as above.    This report was signed and finalized on 5/8/2024 4:45 PM by Jeremi Rodriguez MD.      XR Chest 1 View    Result Date: 5/8/2024  PROCEDURE: XR CHEST 1 VW-  HISTORY: fall, pain  COMPARISON: 4/26/2017  FINDINGS:  Portable view of the chest demonstrates the lungs to be grossly clear. There is no evidence of effusion, pneumothorax or other significant pleural disease. Patient is status post CABG. No obvious rib fracture is present.  The heart size is normal.      Unremarkable portable chest.    This report was signed and finalized on 5/8/2024 4:42 PM by Jeremi Rodriguez MD.           PROCEDURES    Procedures    Interpretations    O2 Sat: The patients oxygen saturation was 100% on Room Air.  This was independently interpreted by me as Normal    EKG: I reviewed and independently interpreted the EKG as sinus rhythm at 75 bpm, normal axis, normal intervals, no ST elevation, no T wave inversion    Cardiac Monitoring: I reviewed and independently interpreted the Rhythm Strip as Normal Sinus rhythm rate of 89    Radiology: I ordered and independently reviewed the above noted radiographic studies.  I viewed images of Xray of the left hip and left femur  which showed  femoral neck fracture  per my independent interpretation. See radiologist's dictation for official interpretation.     Radiology: I ordered and independently reviewed the above noted radiographic studies.  I viewed images of  Chest Xray which showed No pulmonary process per my independent interpretation. See radiologist's dictation for official interpretation      MEDICATIONS GIVEN IN ER    Medications   sodium chloride 0.9 % flush 10 mL (has no administration in time range)   morphine injection 4 mg (4 mg Intravenous Given 5/8/24 1638)   ondansetron (ZOFRAN) injection 4 mg (4 mg Intravenous Given 5/8/24 1637)         MEDICAL DECISION MAKING, PROGRESS, and CONSULTS    All labs, if obtained, have been independently reviewed by me.  All radiology studies, if obtained, have been reviewed by me and the radiologist dictating the report.  All EKG's, if obtained, have been independently viewed and interpreted by me      Discussion below represents my analysis of pertinent findings related to patient's condition, differential diagnosis, treatment plan and final disposition.      Differential diagnosis:    69-year-old female presented ED with complaint of left hip pain after fall, she has deformity left lower extremity, suspect she likely has left hip fracture, she no head injury, no neck pain, she not take anticoagulants, clinically cleared from that standpoint.  Will obtain x-rays of the left hip, left femur, as well as preop labs    Additional Sources:  External (non-ED) record review:  Cardiology visit 7/18/2023 for stable CAD       Orders placed during this visit:  Orders Placed This Encounter   Procedures    XR Hip With or Without Pelvis 2 - 3 View Left    XR Femur 2 View Left    XR Chest 1 View    Comprehensive Metabolic Panel    Protime-INR    CBC Auto Differential    ECG 12 Lead Pre-Op / Pre-Procedure    Insert Peripheral IV    Inpatient Admission    CBC & Differential         Additional orders considered but not ordered:  None    ED Course:    Consultants:  Hospitalist and Dr [facility]    ED Course as of 05/08/24 1712   Wed May 08, 2024   1617 XR Hip With or Without Pelvis 2 - 3 View Left [KH]   1657 Patient with acute left  femoral neck fracture, consistent with her presentation, will contact hospitalist for admission and orthopedics for evaluation [CS]   1704 Spoke to Dr. Hong agrees to evaluate patient for surgical management [CS]   1705 Spoke to Dr. Vigil agrees to evaluate patient for admission [CS]      ED Course User Index  [CS] Roger Simental MD  [KH] Tomas Estevez, MARTINA           After my consideration of clinical presentation and any laboratory/radiology studies obtained, I discussed the findings with the patient/patient representative who is in agreement with the treatment plan and the final disposition. Risks and benefits of admission was discussed     AS OF 17:12 EDT VITALS:    BP - (!) 145/109  HR - 89  TEMP - 97.9 °F (36.6 °C)  O2 SATS - 100%    I reviewed the patients prescription monitoring report if available prior to discharge    DIAGNOSIS  Final diagnoses:   Left displaced femoral neck fracture         DISPOSITION  ED Disposition       ED Disposition   Decision to Admit    Condition   --    Comment   Level of Care: Med/Surg [1]   Diagnosis: Femoral neck fracture [195717]   Admitting Physician: JORDAN VIGIL [[id number]]   Attending Physician: JORDAN VIGIL [[id number]]   Isolate for COVID?: No [0]   Certification: I Certify That Inpatient Hospital Services Are Medically Necessary For Greater Than 2 Midnights                     Please note that portions of this document were completed with voice recognition software.        Roger Simental MD  05/08/24 6610

## 2024-05-08 NOTE — H&P
[facility]   HISTORY AND PHYSICAL      Name:  Trang Maynard   Age:  69 y.o.  Sex:  female  :  1955  MRN:  [id number]   Visit Number:  [id number]  Admission Date:  2024  Date Of Service:  24  Primary Care Physician:  Rod Vance MD     Admitting diagnosis:      Femoral neck fracture    Anxiety    Dyslipidemia    Pill rolling tremors    Depression    Essential hypertension    Gastroesophageal reflux disease with esophagitis without hemorrhage    CAD (coronary artery disease)  Hyponatremia      History Of Presenting Illness:      69-year-old patient with past medical history of coronary artery disease with recent cath, hypertension, depression, GERD, tremors, anxiety disorder, who comes into the ER s/p fall  She fell accidentally could not get up and EMS was called.  She was found to have left femoral fracture and Dr. Hong was consulted who recommended admission for surgery and being the primary I was contacted for her to be admitted.    At present upon evaluation she denies any chest pain nausea vomiting still is having severe pain unable to move her leg.  She is very nervous anxious and scared.  She also has been having tremors which has been from before.  She states that she did have a heart cath and they found distal ostial blockage which could not be stented and has been on medical management.  Besides pain patient is not having any other symptoms  Patient's chart has been reviewed for her workup with labs and the x-ray reports    Review Of Systems:     The following systems were reviewed and negative;  constitution, eyes, ENT, respiratory, cardiovascular, gastrointestinal, genitourinary, musculoskeletal, neurological and behavioral/psych,  Skin except as above.     Past Medical History:    Past Medical History:   Diagnosis Date    Anxiety     Arthritis     CAD (coronary artery disease)     stent     Chronic kidney disease     SEES DR. MIRANDA     Constipation     Depression     Diabetes mellitus     Diarrhea     Dysphagia     SPOUSE REPORTS VERY MILD.    Elevated cholesterol     Gastric ulcer     GERD (gastroesophageal reflux disease)     Heart disease     History of chest pain     SPOUSE REPORTS APPROXIMATLEY FEW MONTHS AGO AND THAT PATIENT HAD WORK UP WITH DR MAGANA.  STATES HAD NUCLEAR STRESS AND IT WAS WNL.      Hyperlipidemia     Hypertension     Hypokalemia     Kidney stone     Low sodium levels 2017    Psychosis     PVD (peripheral vascular disease)     Schizophrenia     SPOUSE REPORTED    Seizures 01/2016    ONLY HAD ONE EPISODE.  STATED SIDE EFFECTS OF MEDICATION    Sleep apnea     SPOUSE REPORTS PATIENT WAS NOT ABLE TO TOLERATE THE CPAP.      Thyroid condition     TIA (transient ischemic attack)     2010.  SPOUSE REPORTS NO RESIDUAL PROBLEMS.    Tremors of nervous system     Poss. r/t medications pt is on.    Uterine fibroid     Vitamin B12 deficiency        Past Surgical history:    Past Surgical History:   Procedure Laterality Date    CARDIAC CATHETERIZATION      SPOUSE REPORTS 2005 AND 2007 (REPORTS A TOTAL OF 4 STENTS WERE PLACED)    CARDIAC SURGERY      SPOUSE REPORTS 2 STENTS PLACED IN 2005 AND 2 STENTS IN 2007    COLONOSCOPY N/A 5/31/2018    Procedure: COLONOSCOPY with cold biopsy polypectomies and biopsies;  Surgeon: Servando Rooney MD;  Location: [facility] ENDOSCOPY;  Service: Gastroenterology    CYST REMOVAL      SPOUSE REPORTS REMOVED FROM NECK    ENDOSCOPY N/A 9/28/2017    Procedure: ESOPHAGOGASTRODUODENOSCOPY WITH BIOPSIES;  Surgeon: Servando Rooney MD;  Location: [facility] ENDOSCOPY;  Service:     ENDOSCOPY N/A 1/25/2018    Procedure: ESOPHAGOGASTRODUODENOSCOPY with biopsies;  Surgeon: Servando Rooney MD;  Location: [facility] ENDOSCOPY;  Service:     ENDOSCOPY      ENDOSCOPY N/A 7/26/2018    Procedure: ESOPHAGOGASTRODUODENOSCOPY WITH BIOPSIES;  Surgeon: Servando Rooney MD;  Location: [facility] ENDOSCOPY;  Service: Gastroenterology    OTHER  "SURGICAL HISTORY      SPOUSE REPORTS PATIENT ATTEMPTED COLONOSCOPY BEFORE BUT PREP WAS NOT COMPLETE    TUBAL ABDOMINAL LIGATION         Social History:    Social History     Socioeconomic History    Marital status:    Tobacco Use    Smoking status: Former     Current packs/day: 0.00     Average packs/day: 0.3 packs/day for 5.0 years (1.3 ttl pk-yrs)     Types: Cigarettes     Start date: 1987     Quit date: 1992     Years since quittin.6    Smokeless tobacco: Never    Tobacco comments:     SPOUSE REPORTS \"SHE DIDN'T INHALE\"   Vaping Use    Vaping status: Never Used   Substance and Sexual Activity    Alcohol use: No    Drug use: No    Sexual activity: Defer       Family History:    Family History   Problem Relation Age of Onset    Heart disease Mother     Hypertension Mother     Alcohol abuse Father     Colon cancer Neg Hx          Allergies:      Penicillins    Home Medications:    Prior to Admission Medications       Prescriptions Last Dose Informant Patient Reported? Taking?    aspirin 81 MG EC tablet  Spouse/Significant Other Yes No    Take 1 tablet by mouth Daily.    atorvastatin (LIPITOR) 80 MG tablet  Spouse/Significant Other Yes No    Take 1 tablet by mouth Daily.    benztropine (COGENTIN) 0.5 MG tablet  Spouse/Significant Other Yes No    Take 1 tablet by mouth Daily.    clopidogrel (PLAVIX) 75 MG tablet   Yes No    Take 1 tablet by mouth Daily.    dicyclomine (BENTYL) 20 MG tablet   No No    Take 1 tablet by mouth 3 (Three) Times a Day As Needed (lower abdominal pain and diarrhea).    Patient not taking:  Reported on 2023    levothyroxine (SYNTHROID, LEVOTHROID) 25 MCG tablet  Spouse/Significant Other Yes No    Take 1 tablet by mouth Daily.    lisinopril (PRINIVIL,ZESTRIL) 20 MG tablet  Spouse/Significant Other Yes No    Take 1 tablet by mouth Daily.    metFORMIN (GLUCOPHAGE) 500 MG tablet  Spouse/Significant Other Yes No    Take 500 mg by mouth 2 (Two) Times a Day With Meals.    " Patient not taking:  Reported on 7/18/2023    metoprolol tartrate (LOPRESSOR) 100 MG tablet  Spouse/Significant Other Yes No    Take 50 mg by mouth 2 (Two) Times a Day. She takes 1/2 tab twice daily    Patient not taking:  Reported on 7/18/2023    metoprolol tartrate (LOPRESSOR) 50 MG tablet   Yes No    Take 1 tablet by mouth Every 12 (Twelve) Hours.    ondansetron ODT (ZOFRAN-ODT) 4 MG disintegrating tablet   No No    Place 1 tablet on the tongue Every 8 (Eight) Hours As Needed for Nausea or Vomiting.    Paliperidone Palmitate (INVEGA TRINZA) 546 MG/1.75ML suspension IM injection  Pharmacy Yes No    Inject 1.75 mL into the appropriate muscle as directed by prescriber Every 3 (Three) Months.    pantoprazole (PROTONIX) 40 MG EC tablet   No No    TAKE 1 TABLET BY MOUTH DAILY HALF AN HOUR BEFORE BREAKFAST    Patient not taking:  Reported on 7/18/2023                   Vital Signs:    Temp:  [97.9 °F (36.6 °C)] 97.9 °F (36.6 °C)  Heart Rate:  [70-89] 70  Resp:  [22] 22  BP: (145-187)/(101-109) 187/101        05/08/24  1551   Weight: 68 kg (150 lb)       Body mass index is 24.21 kg/m².    Physical Exam:      General Appearance:    Alert and cooperative,  And lying comfortably in bed   Head:    Atraumatic and normocephalic, without obvious abnormality.   Eyes:            PERRLA, conjunctivae and sclerae normal, no Icterus. No pallor. Extraocular movements are within normal limits.   Ears:    Ears appear intact with no abnormalities noted.   Throat:   No oral lesions, no thrush, oral mucosa moist.   Neck:   Supple, trachea midline, no thyromegaly, no carotid bruit, no lymphadenopathy   Lungs:    Breath sounds heard bilaterally equally.  No crackles or wheezing. No Pleural rub or bronchial breathing.       Heart:    Normal S1 and S2, no murmur, no gallop, no rub. No JVD   Abdomen:     Normal bowel sounds, no masses, no organomegaly. Soft   nontender, nondistended, no guarding, no rebound    tenderness   Extremities: Left  leg is straight cannot move Vitetal with slight deviation noted externally no edema, no cyanosis, no          clubbing   Skin:   No  bruising or rash   Neurologic:   Cranial nerves 2 - 12 grossly intact, sensation intact, Motor power is normal on the right side left side difficult to evaluate because of her left leg fracture and unable to move it       EKG:      Sinus rhythm with no ischemic findings    Labs:    Lab Results (last 24 hours)       Procedure Component Value Units Date/Time    Comprehensive Metabolic Panel [[id number]]  (Abnormal) Collected: 05/08/24 1646    Specimen: Blood Updated: 05/08/24 1714     Glucose 109 mg/dL      BUN 8 mg/dL      Creatinine 0.84 mg/dL      Sodium 128 mmol/L      Potassium 4.1 mmol/L      Chloride 90 mmol/L      CO2 27.2 mmol/L      Calcium 9.0 mg/dL      Total Protein 6.9 g/dL      Albumin 4.2 g/dL      ALT (SGPT) 17 U/L      AST (SGOT) 22 U/L      Alkaline Phosphatase 101 U/L      Total Bilirubin 0.6 mg/dL      Globulin 2.7 gm/dL      A/G Ratio 1.6 g/dL      BUN/Creatinine Ratio 9.5     Anion Gap 10.8 mmol/L      eGFR 74.4 mL/min/1.73     Narrative:      GFR Normal >60  Chronic Kidney Disease <60  Kidney Failure <15    The GFR formula is only valid for adults with stable renal function between ages 18 and 70.    Protime-INR [[id number]]  (Normal) Collected: 05/08/24 1646    Specimen: Blood Updated: 05/08/24 1710     Protime 13.9 Seconds      INR 1.02    Narrative:      Suggested INR therapeutic range for stable oral anticoagulant therapy:    Low Intensity therapy:   1.5-2.0  Moderate Intensity therapy:   2.0-3.0  High Intensity therapy:   2.5-4.0    CBC & Differential [[id number]]  (Abnormal) Collected: 05/08/24 1646    Specimen: Blood Updated: 05/08/24 1651    Narrative:      The following orders were created for panel order CBC & Differential.  Procedure                               Abnormality         Status                     ---------                                -----------         ------                     CBC Auto Differential[[id number]]        Abnormal            Final result                 Please view results for these tests on the individual orders.    CBC Auto Differential [[id number]]  (Abnormal) Collected: 05/08/24 1646    Specimen: Blood Updated: 05/08/24 1651     WBC 10.96 10*3/mm3      RBC 4.24 10*6/mm3      Hemoglobin 12.6 g/dL      Hematocrit 37.1 %      MCV 87.5 fL      MCH 29.7 pg      MCHC 34.0 g/dL      RDW 14.3 %      RDW-SD 46.1 fl      MPV 9.7 fL      Platelets 183 10*3/mm3      Neutrophil % 84.4 %      Lymphocyte % 8.8 %      Monocyte % 5.9 %      Eosinophil % 0.1 %      Basophil % 0.3 %      Immature Grans % 0.5 %      Neutrophils, Absolute 9.25 10*3/mm3      Lymphocytes, Absolute 0.96 10*3/mm3      Monocytes, Absolute 0.65 10*3/mm3      Eosinophils, Absolute 0.01 10*3/mm3      Basophils, Absolute 0.03 10*3/mm3      Immature Grans, Absolute 0.06 10*3/mm3      nRBC 0.0 /100 WBC             Radiology:    Imaging Results (Last 72 Hours)       Procedure Component Value Units Date/Time    XR Femur 2 View Left [[id number]] Collected: 05/08/24 1642     Updated: 05/08/24 1701    Narrative:      PROCEDURE: XR FEMUR 2 VW LEFT-     HISTORY: fall, pain, deformity     FINDINGS:  Two views show, fracture of the left femoral neck.  There is  moderate displacement.     The joint spaces appear normal.          Impression:      Left femoral neck fracture as above.           This report was signed and finalized on 5/8/2024 4:59 PM by Jeremi Rodriguez MD.       XR Hip With or Without Pelvis 2 - 3 View Left [[id number]] Collected: 05/08/24 1644     Updated: 05/08/24 1647    Narrative:      PROCEDURE: XR HIP W OR WO PELVIS 2-3 VIEW LEFT-     HISTORY: Fall, pain, deformity     FINDINGS:  Two views show fracture of the left femoral neck.  There is  moderate displacement. It is difficult to assess whether the fracture  subcapital or mid cervical.     The joint spaces appear  normal.          Impression:      Moderately displaced femoral neck fracture as above.           This report was signed and finalized on 5/8/2024 4:45 PM by Jeremi Rodriguez MD.       XR Chest 1 View [[id number]] Collected: 05/08/24 1642     Updated: 05/08/24 1645    Narrative:      PROCEDURE: XR CHEST 1 VW-     HISTORY: fall, pain     COMPARISON: 4/26/2017     FINDINGS:  Portable view of the chest demonstrates the lungs to be  grossly clear. There is no evidence of effusion, pneumothorax or other  significant pleural disease. Patient is status post CABG. No obvious rib  fracture is present.     The heart size is normal.       Impression:      Unremarkable portable chest.            This report was signed and finalized on 5/8/2024 4:42 PM by Jeremi Rodriguez MD.               Assessment:    Assessment & Plan       Femoral neck fracture    Anxiety    Dyslipidemia    Pill rolling tremors    Depression    Essential hypertension    Gastroesophageal reflux disease with esophagitis without hemorrhage    CAD (coronary artery disease)        Plan:     1.  Femoral  neck fracture on the left side s/p fall-will admit for further surgical management  Optimize pain control  She is clinically stable to undergo surgery and will consult cardiology due to recent history of CAD and on medical management    2.  Coronary artery disease-she is currently on medical management and cardiology consult has been done due to recent catheterization with some abnormality.  There are no obvious acute ischemic findings noted  She is on aspirin Plavix beta-blocker as well as ACE inhibitor's and statins    3.  Hypertension-she has been on metoprolol and continue with lisinopril will be increased to 40 mg daily    Goals of treatment plan of care has been addressed with the patient and see rest as per orders      Rod Vance MD  05/08/24  18:18 EDT    Please note that portions of this note were completed with a voice recognition program.

## 2024-05-09 LAB
ALBUMIN SERPL-MCNC: 3.9 G/DL (ref 3.5–5.2)
ALBUMIN/GLOB SERPL: 1.6 G/DL
ALP SERPL-CCNC: 84 U/L (ref 39–117)
ALT SERPL W P-5'-P-CCNC: 15 U/L (ref 1–33)
ANION GAP SERPL CALCULATED.3IONS-SCNC: 13.2 MMOL/L (ref 5–15)
AST SERPL-CCNC: 23 U/L (ref 1–32)
BASOPHILS # BLD AUTO: 0.03 10*3/MM3 (ref 0–0.2)
BASOPHILS NFR BLD AUTO: 0.2 % (ref 0–1.5)
BILIRUB SERPL-MCNC: 0.9 MG/DL (ref 0–1.2)
BUN SERPL-MCNC: 8 MG/DL (ref 8–23)
BUN/CREAT SERPL: 10.1 (ref 7–25)
CALCIUM SPEC-SCNC: 9.4 MG/DL (ref 8.6–10.5)
CHLORIDE SERPL-SCNC: 90 MMOL/L (ref 98–107)
CO2 SERPL-SCNC: 24.8 MMOL/L (ref 22–29)
CREAT SERPL-MCNC: 0.79 MG/DL (ref 0.57–1)
DEPRECATED RDW RBC AUTO: 46.2 FL (ref 37–54)
EGFRCR SERPLBLD CKD-EPI 2021: 80.1 ML/MIN/1.73
EOSINOPHIL # BLD AUTO: 0 10*3/MM3 (ref 0–0.4)
EOSINOPHIL NFR BLD AUTO: 0 % (ref 0.3–6.2)
ERYTHROCYTE [DISTWIDTH] IN BLOOD BY AUTOMATED COUNT: 14.4 % (ref 12.3–15.4)
GLOBULIN UR ELPH-MCNC: 2.5 GM/DL
GLUCOSE SERPL-MCNC: 129 MG/DL (ref 65–99)
HBA1C MFR BLD: 6 % (ref 4.8–5.6)
HCT VFR BLD AUTO: 36.1 % (ref 34–46.6)
HGB BLD-MCNC: 12.2 G/DL (ref 12–15.9)
IMM GRANULOCYTES # BLD AUTO: 0.04 10*3/MM3 (ref 0–0.05)
IMM GRANULOCYTES NFR BLD AUTO: 0.3 % (ref 0–0.5)
LYMPHOCYTES # BLD AUTO: 0.82 10*3/MM3 (ref 0.7–3.1)
LYMPHOCYTES NFR BLD AUTO: 6 % (ref 19.6–45.3)
MCH RBC QN AUTO: 29.6 PG (ref 26.6–33)
MCHC RBC AUTO-ENTMCNC: 33.8 G/DL (ref 31.5–35.7)
MCV RBC AUTO: 87.6 FL (ref 79–97)
MONOCYTES # BLD AUTO: 0.87 10*3/MM3 (ref 0.1–0.9)
MONOCYTES NFR BLD AUTO: 6.4 % (ref 5–12)
NEUTROPHILS NFR BLD AUTO: 11.88 10*3/MM3 (ref 1.7–7)
NEUTROPHILS NFR BLD AUTO: 87.1 % (ref 42.7–76)
NRBC BLD AUTO-RTO: 0 /100 WBC (ref 0–0.2)
PLATELET # BLD AUTO: 176 10*3/MM3 (ref 140–450)
PMV BLD AUTO: 9.9 FL (ref 6–12)
POTASSIUM SERPL-SCNC: 4 MMOL/L (ref 3.5–5.2)
PROT SERPL-MCNC: 6.4 G/DL (ref 6–8.5)
RBC # BLD AUTO: 4.12 10*6/MM3 (ref 3.77–5.28)
SODIUM SERPL-SCNC: 128 MMOL/L (ref 136–145)
TSH SERPL DL<=0.05 MIU/L-ACNC: 6.26 UIU/ML (ref 0.27–4.2)
WBC NRBC COR # BLD AUTO: 13.64 10*3/MM3 (ref 3.4–10.8)

## 2024-05-09 PROCEDURE — 25010000002 MORPHINE PER 10 MG: Performed by: INTERNAL MEDICINE

## 2024-05-09 PROCEDURE — 80053 COMPREHEN METABOLIC PANEL: CPT | Performed by: INTERNAL MEDICINE

## 2024-05-09 PROCEDURE — 25010000002 ONDANSETRON PER 1 MG: Performed by: STUDENT IN AN ORGANIZED HEALTH CARE EDUCATION/TRAINING PROGRAM

## 2024-05-09 PROCEDURE — 99222 1ST HOSP IP/OBS MODERATE 55: CPT | Performed by: INTERNAL MEDICINE

## 2024-05-09 PROCEDURE — 25010000002 PROCHLORPERAZINE 10 MG/2ML SOLUTION: Performed by: STUDENT IN AN ORGANIZED HEALTH CARE EDUCATION/TRAINING PROGRAM

## 2024-05-09 PROCEDURE — 85025 COMPLETE CBC W/AUTO DIFF WBC: CPT | Performed by: INTERNAL MEDICINE

## 2024-05-09 PROCEDURE — 25810000003 SODIUM CHLORIDE 0.9 % SOLUTION: Performed by: INTERNAL MEDICINE

## 2024-05-09 PROCEDURE — 83036 HEMOGLOBIN GLYCOSYLATED A1C: CPT | Performed by: INTERNAL MEDICINE

## 2024-05-09 PROCEDURE — 84443 ASSAY THYROID STIM HORMONE: CPT | Performed by: INTERNAL MEDICINE

## 2024-05-09 RX ORDER — PROCHLORPERAZINE EDISYLATE 5 MG/ML
2.5 INJECTION INTRAMUSCULAR; INTRAVENOUS EVERY 6 HOURS PRN
Status: DISCONTINUED | OUTPATIENT
Start: 2024-05-09 | End: 2024-05-12 | Stop reason: HOSPADM

## 2024-05-09 RX ORDER — NITROGLYCERIN 0.4 MG/1
0.4 TABLET SUBLINGUAL
Status: DISCONTINUED | OUTPATIENT
Start: 2024-05-09 | End: 2024-05-12 | Stop reason: HOSPADM

## 2024-05-09 RX ORDER — OMEPRAZOLE 40 MG/1
40 CAPSULE, DELAYED RELEASE ORAL DAILY
COMMUNITY

## 2024-05-09 RX ORDER — ONDANSETRON 2 MG/ML
4 INJECTION INTRAMUSCULAR; INTRAVENOUS EVERY 6 HOURS PRN
Status: DISCONTINUED | OUTPATIENT
Start: 2024-05-09 | End: 2024-05-12 | Stop reason: HOSPADM

## 2024-05-09 RX ADMIN — METOPROLOL TARTRATE 50 MG: 25 TABLET, FILM COATED ORAL at 20:36

## 2024-05-09 RX ADMIN — MORPHINE SULFATE 2 MG: 2 INJECTION, SOLUTION INTRAMUSCULAR; INTRAVENOUS at 13:34

## 2024-05-09 RX ADMIN — MORPHINE SULFATE 2 MG: 2 INJECTION, SOLUTION INTRAMUSCULAR; INTRAVENOUS at 01:53

## 2024-05-09 RX ADMIN — HYDROCODONE BITARTRATE AND ACETAMINOPHEN 1 TABLET: 5; 325 TABLET ORAL at 16:28

## 2024-05-09 RX ADMIN — PROCHLORPERAZINE EDISYLATE 2.5 MG: 5 INJECTION INTRAMUSCULAR; INTRAVENOUS at 05:04

## 2024-05-09 RX ADMIN — MORPHINE SULFATE 2 MG: 2 INJECTION, SOLUTION INTRAMUSCULAR; INTRAVENOUS at 10:05

## 2024-05-09 RX ADMIN — MORPHINE SULFATE 2 MG: 2 INJECTION, SOLUTION INTRAMUSCULAR; INTRAVENOUS at 18:25

## 2024-05-09 RX ADMIN — HYDROCODONE BITARTRATE AND ACETAMINOPHEN 1 TABLET: 5; 325 TABLET ORAL at 23:19

## 2024-05-09 RX ADMIN — SODIUM CHLORIDE 50 ML/HR: 9 INJECTION, SOLUTION INTRAVENOUS at 16:17

## 2024-05-09 RX ADMIN — MORPHINE SULFATE 2 MG: 2 INJECTION, SOLUTION INTRAMUSCULAR; INTRAVENOUS at 06:35

## 2024-05-09 RX ADMIN — ONDANSETRON 4 MG: 2 INJECTION INTRAMUSCULAR; INTRAVENOUS at 00:54

## 2024-05-09 NOTE — PAYOR COMM NOTE
"TO:[facility]  FROM:PARUL FRAGA RN PHONE [phone]  IN CLINICALS REF# [id number]      Trang Maynard (69 y.o. Female)       Date of Birth   1955    Social Security Number       Address   [address] [place] [address]    Home Phone   [phone]    MRN   [id number]       Jew   None    Marital Status                               Admission Date   24    Admission Type   Emergency    Admitting Provider   Rod Vance MD    Attending Provider   Rod Vance MD    Department, Room/Bed   [facility] TELEMETRY SDS OVERFLOW, T04       Discharge Date       Discharge Disposition       Discharge Destination                                 Attending Provider: Rod Vance MD    Allergies: Penicillins    Isolation: None   Infection: None   Code Status: CPR    Ht: 170.2 cm (67\")   Wt: 58.4 kg (128 lb 12 oz)    Admission Cmt: None   Principal Problem: Femoral neck fracture [S72.009A]                   Active Insurance as of 2024       Primary Coverage       Payor Plan Insurance Group Employer/Plan Group    [facility] MEDICARE REPLACEMENT [facility] MED ADV SNP HMO [id number]       Payor Plan Address Payor Plan Phone Number Payor Plan Fax Number Effective Dates    [address] [address]   2023 - None Entered    [facility] [id number]         Subscriber Name Subscriber Birth Date Member ID       TRANG MAYNARD 1955 [id number]                     Emergency Contacts        (Rel.) Home Phone Work Phone Mobile Phone    Seymour Maynard (Spouse) [phone] -- --    Mojgan Whitmore (Daughter) -- -- [phone]                 History & Physical        Rod Vance MD at 24 1818              [facility] MEDICINE   HISTORY AND PHYSICAL      Name:  Trang Maynard   Age:  69 y.o.  Sex:  female  :  1955  MRN:  [id number]   Visit Number:  [id number]  Admission Date:  " 5/8/2024  Date Of Service:  05/08/24  Primary Care Physician:  Rod Vance MD     Admitting diagnosis:      Femoral neck fracture    Anxiety    Dyslipidemia    Pill rolling tremors    Depression    Essential hypertension    Gastroesophageal reflux disease with esophagitis without hemorrhage    CAD (coronary artery disease)  Hyponatremia      History Of Presenting Illness:      69-year-old patient with past medical history of coronary artery disease with recent cath, hypertension, depression, GERD, tremors, anxiety disorder, who comes into the ER s/p fall  She fell accidentally could not get up and EMS was called.  She was found to have left femoral fracture and Dr. Hong was consulted who recommended admission for surgery and being the primary I was contacted for her to be admitted.    At present upon evaluation she denies any chest pain nausea vomiting still is having severe pain unable to move her leg.  She is very nervous anxious and scared.  She also has been having tremors which has been from before.  She states that she did have a heart cath and they found distal ostial blockage which could not be stented and has been on medical management.  Besides pain patient is not having any other symptoms  Patient's chart has been reviewed for her workup with labs and the x-ray reports    Review Of Systems:     The following systems were reviewed and negative;  constitution, eyes, ENT, respiratory, cardiovascular, gastrointestinal, genitourinary, musculoskeletal, neurological and behavioral/psych,  Skin except as above.     Past Medical History:    Past Medical History:   Diagnosis Date    Anxiety     Arthritis     CAD (coronary artery disease)     stent 2007    Chronic kidney disease     SEES DR. MIRANDA    Constipation     Depression     Diabetes mellitus     Diarrhea     Dysphagia     SPOUSE REPORTS VERY MILD.    Elevated cholesterol     Gastric ulcer     GERD (gastroesophageal reflux disease)     Heart disease      History of chest pain     SPOUSE REPORTS APPROXIMATLEY FEW MONTHS AGO AND THAT PATIENT HAD WORK UP WITH DR MAGANA.  STATES HAD NUCLEAR STRESS AND IT WAS WNL.      Hyperlipidemia     Hypertension     Hypokalemia     Kidney stone     Low sodium levels 2017    Psychosis     PVD (peripheral vascular disease)     Schizophrenia     SPOUSE REPORTED    Seizures 01/2016    ONLY HAD ONE EPISODE.  STATED SIDE EFFECTS OF MEDICATION    Sleep apnea     SPOUSE REPORTS PATIENT WAS NOT ABLE TO TOLERATE THE CPAP.      Thyroid condition     TIA (transient ischemic attack)     2010.  SPOUSE REPORTS NO RESIDUAL PROBLEMS.    Tremors of nervous system     Poss. r/t medications pt is on.    Uterine fibroid     Vitamin B12 deficiency        Past Surgical history:    Past Surgical History:   Procedure Laterality Date    CARDIAC CATHETERIZATION      SPOUSE REPORTS 2005 AND 2007 (REPORTS A TOTAL OF 4 STENTS WERE PLACED)    CARDIAC SURGERY      SPOUSE REPORTS 2 STENTS PLACED IN 2005 AND 2 STENTS IN 2007    COLONOSCOPY N/A 5/31/2018    Procedure: COLONOSCOPY with cold biopsy polypectomies and biopsies;  Surgeon: Servando Rooney MD;  Location: [facility] ENDOSCOPY;  Service: Gastroenterology    CYST REMOVAL      SPOUSE REPORTS REMOVED FROM NECK    ENDOSCOPY N/A 9/28/2017    Procedure: ESOPHAGOGASTRODUODENOSCOPY WITH BIOPSIES;  Surgeon: Servando Rooney MD;  Location: [facility] ENDOSCOPY;  Service:     ENDOSCOPY N/A 1/25/2018    Procedure: ESOPHAGOGASTRODUODENOSCOPY with biopsies;  Surgeon: Servando Rooney MD;  Location: [facility] ENDOSCOPY;  Service:     ENDOSCOPY      ENDOSCOPY N/A 7/26/2018    Procedure: ESOPHAGOGASTRODUODENOSCOPY WITH BIOPSIES;  Surgeon: Servando Rooney MD;  Location: [facility] ENDOSCOPY;  Service: Gastroenterology    OTHER SURGICAL HISTORY      SPOUSE REPORTS PATIENT ATTEMPTED COLONOSCOPY BEFORE BUT PREP WAS NOT COMPLETE    TUBAL ABDOMINAL LIGATION         Social History:    Social History     Socioeconomic History    Marital status:  "   Tobacco Use    Smoking status: Former     Current packs/day: 0.00     Average packs/day: 0.3 packs/day for 5.0 years (1.3 ttl pk-yrs)     Types: Cigarettes     Start date: 1987     Quit date: 1992     Years since quittin.6    Smokeless tobacco: Never    Tobacco comments:     SPOUSE REPORTS \"SHE DIDN'T INHALE\"   Vaping Use    Vaping status: Never Used   Substance and Sexual Activity    Alcohol use: No    Drug use: No    Sexual activity: Defer       Family History:    Family History   Problem Relation Age of Onset    Heart disease Mother     Hypertension Mother     Alcohol abuse Father     Colon cancer Neg Hx          Allergies:      Penicillins    Home Medications:    Prior to Admission Medications       Prescriptions Last Dose Informant Patient Reported? Taking?    aspirin 81 MG EC tablet  Spouse/Significant Other Yes No    Take 1 tablet by mouth Daily.    atorvastatin (LIPITOR) 80 MG tablet  Spouse/Significant Other Yes No    Take 1 tablet by mouth Daily.    benztropine (COGENTIN) 0.5 MG tablet  Spouse/Significant Other Yes No    Take 1 tablet by mouth Daily.    clopidogrel (PLAVIX) 75 MG tablet   Yes No    Take 1 tablet by mouth Daily.    dicyclomine (BENTYL) 20 MG tablet   No No    Take 1 tablet by mouth 3 (Three) Times a Day As Needed (lower abdominal pain and diarrhea).    Patient not taking:  Reported on 2023    levothyroxine (SYNTHROID, LEVOTHROID) 25 MCG tablet  Spouse/Significant Other Yes No    Take 1 tablet by mouth Daily.    lisinopril (PRINIVIL,ZESTRIL) 20 MG tablet  Spouse/Significant Other Yes No    Take 1 tablet by mouth Daily.    metFORMIN (GLUCOPHAGE) 500 MG tablet  Spouse/Significant Other Yes No    Take 500 mg by mouth 2 (Two) Times a Day With Meals.    Patient not taking:  Reported on 2023    metoprolol tartrate (LOPRESSOR) 100 MG tablet  Spouse/Significant Other Yes No    Take 50 mg by mouth 2 (Two) Times a Day. She takes 1/2 tab twice daily    Patient not " taking:  Reported on 7/18/2023    metoprolol tartrate (LOPRESSOR) 50 MG tablet   Yes No    Take 1 tablet by mouth Every 12 (Twelve) Hours.    ondansetron ODT (ZOFRAN-ODT) 4 MG disintegrating tablet   No No    Place 1 tablet on the tongue Every 8 (Eight) Hours As Needed for Nausea or Vomiting.    Paliperidone Palmitate (INVEGA TRINZA) 546 MG/1.75ML suspension IM injection  Pharmacy Yes No    Inject 1.75 mL into the appropriate muscle as directed by prescriber Every 3 (Three) Months.    pantoprazole (PROTONIX) 40 MG EC tablet   No No    TAKE 1 TABLET BY MOUTH DAILY HALF AN HOUR BEFORE BREAKFAST    Patient not taking:  Reported on 7/18/2023                   Vital Signs:    Temp:  [97.9 °F (36.6 °C)] 97.9 °F (36.6 °C)  Heart Rate:  [70-89] 70  Resp:  [22] 22  BP: (145-187)/(101-109) 187/101        05/08/24  1551   Weight: 68 kg (150 lb)       Body mass index is 24.21 kg/m².    Physical Exam:      General Appearance:    Alert and cooperative,  And lying comfortably in bed   Head:    Atraumatic and normocephalic, without obvious abnormality.   Eyes:            PERRLA, conjunctivae and sclerae normal, no Icterus. No pallor. Extraocular movements are within normal limits.   Ears:    Ears appear intact with no abnormalities noted.   Throat:   No oral lesions, no thrush, oral mucosa moist.   Neck:   Supple, trachea midline, no thyromegaly, no carotid bruit, no lymphadenopathy   Lungs:    Breath sounds heard bilaterally equally.  No crackles or wheezing. No Pleural rub or bronchial breathing.       Heart:    Normal S1 and S2, no murmur, no gallop, no rub. No JVD   Abdomen:     Normal bowel sounds, no masses, no organomegaly. Soft   nontender, nondistended, no guarding, no rebound    tenderness   Extremities: Left leg is straight cannot move Vitetal with slight deviation noted externally no edema, no cyanosis, no          clubbing   Skin:   No  bruising or rash   Neurologic:   Cranial nerves 2 - 12 grossly intact, sensation  intact, Motor power is normal on the right side left side difficult to evaluate because of her left leg fracture and unable to move it       EKG:      Sinus rhythm with no ischemic findings    Labs:    Lab Results (last 24 hours)       Procedure Component Value Units Date/Time    Comprehensive Metabolic Panel [[id number]]  (Abnormal) Collected: 05/08/24 1646    Specimen: Blood Updated: 05/08/24 1714     Glucose 109 mg/dL      BUN 8 mg/dL      Creatinine 0.84 mg/dL      Sodium 128 mmol/L      Potassium 4.1 mmol/L      Chloride 90 mmol/L      CO2 27.2 mmol/L      Calcium 9.0 mg/dL      Total Protein 6.9 g/dL      Albumin 4.2 g/dL      ALT (SGPT) 17 U/L      AST (SGOT) 22 U/L      Alkaline Phosphatase 101 U/L      Total Bilirubin 0.6 mg/dL      Globulin 2.7 gm/dL      A/G Ratio 1.6 g/dL      BUN/Creatinine Ratio 9.5     Anion Gap 10.8 mmol/L      eGFR 74.4 mL/min/1.73     Narrative:      GFR Normal >60  Chronic Kidney Disease <60  Kidney Failure <15    The GFR formula is only valid for adults with stable renal function between ages 18 and 70.    Protime-INR [[id number]]  (Normal) Collected: 05/08/24 1646    Specimen: Blood Updated: 05/08/24 1710     Protime 13.9 Seconds      INR 1.02    Narrative:      Suggested INR therapeutic range for stable oral anticoagulant therapy:    Low Intensity therapy:   1.5-2.0  Moderate Intensity therapy:   2.0-3.0  High Intensity therapy:   2.5-4.0    CBC & Differential [[id number]]  (Abnormal) Collected: 05/08/24 1646    Specimen: Blood Updated: 05/08/24 1651    Narrative:      The following orders were created for panel order CBC & Differential.  Procedure                               Abnormality         Status                     ---------                               -----------         ------                     CBC Auto Differential[[id number]]        Abnormal            Final result                 Please view results for these tests on the individual orders.    CBC Auto  Differential [[id number]]  (Abnormal) Collected: 05/08/24 1646    Specimen: Blood Updated: 05/08/24 1651     WBC 10.96 10*3/mm3      RBC 4.24 10*6/mm3      Hemoglobin 12.6 g/dL      Hematocrit 37.1 %      MCV 87.5 fL      MCH 29.7 pg      MCHC 34.0 g/dL      RDW 14.3 %      RDW-SD 46.1 fl      MPV 9.7 fL      Platelets 183 10*3/mm3      Neutrophil % 84.4 %      Lymphocyte % 8.8 %      Monocyte % 5.9 %      Eosinophil % 0.1 %      Basophil % 0.3 %      Immature Grans % 0.5 %      Neutrophils, Absolute 9.25 10*3/mm3      Lymphocytes, Absolute 0.96 10*3/mm3      Monocytes, Absolute 0.65 10*3/mm3      Eosinophils, Absolute 0.01 10*3/mm3      Basophils, Absolute 0.03 10*3/mm3      Immature Grans, Absolute 0.06 10*3/mm3      nRBC 0.0 /100 WBC             Radiology:    Imaging Results (Last 72 Hours)       Procedure Component Value Units Date/Time    XR Femur 2 View Left [[id number]] Collected: 05/08/24 1642     Updated: 05/08/24 1701    Narrative:      PROCEDURE: XR FEMUR 2 VW LEFT-     HISTORY: fall, pain, deformity     FINDINGS:  Two views show, fracture of the left femoral neck.  There is  moderate displacement.     The joint spaces appear normal.          Impression:      Left femoral neck fracture as above.           This report was signed and finalized on 5/8/2024 4:59 PM by Jeremi Rodriguez MD.       XR Hip With or Without Pelvis 2 - 3 View Left [[id number]] Collected: 05/08/24 1644     Updated: 05/08/24 1647    Narrative:      PROCEDURE: XR HIP W OR WO PELVIS 2-3 VIEW LEFT-     HISTORY: Fall, pain, deformity     FINDINGS:  Two views show fracture of the left femoral neck.  There is  moderate displacement. It is difficult to assess whether the fracture  subcapital or mid cervical.     The joint spaces appear normal.          Impression:      Moderately displaced femoral neck fracture as above.           This report was signed and finalized on 5/8/2024 4:45 PM by Jeremi Rodriguez MD.       XR Chest 1 View [[id number]]  Collected: 05/08/24 1642     Updated: 05/08/24 1645    Narrative:      PROCEDURE: XR CHEST 1 VW-     HISTORY: fall, pain     COMPARISON: 4/26/2017     FINDINGS:  Portable view of the chest demonstrates the lungs to be  grossly clear. There is no evidence of effusion, pneumothorax or other  significant pleural disease. Patient is status post CABG. No obvious rib  fracture is present.     The heart size is normal.       Impression:      Unremarkable portable chest.            This report was signed and finalized on 5/8/2024 4:42 PM by Jeremi Rodriguez MD.               Assessment:    Assessment & Plan      Femoral neck fracture    Anxiety    Dyslipidemia    Pill rolling tremors    Depression    Essential hypertension    Gastroesophageal reflux disease with esophagitis without hemorrhage    CAD (coronary artery disease)        Plan:     1.  Femoral  neck fracture on the left side s/p fall-will admit for further surgical management  Optimize pain control  She is clinically stable to undergo surgery and will consult cardiology due to recent history of CAD and on medical management    2.  Coronary artery disease-she is currently on medical management and cardiology consult has been done due to recent catheterization with some abnormality.  There are no obvious acute ischemic findings noted  She is on aspirin Plavix beta-blocker as well as ACE inhibitor's and statins    3.  Hypertension-she has been on metoprolol and continue with lisinopril will be increased to 40 mg daily    Goals of treatment plan of care has been addressed with the patient and see rest as per evelia Vance MD  05/08/24  18:18 EDT    Please note that portions of this note were completed with a voice recognition program.    Electronically signed by Rod Vance MD at 05/08/24 1825          Emergency Department Notes        Ashtyn Zheng, ABDOUL at 05/08/24 2113          Pt put on 2L NC at this time. SpO2 96%. NADN    Electronically signed  by Ashtyn Zheng RN at 24 2114       Manju Marroquin at 24 1704          Dr. Vance contacted at this time for Dr. Simental. Call transferred at this time.    Electronically signed by Manju Marroquin at 24 1705       Manju Marroquin at 24 1704          Dr. Hong contacted at this time for Dr. Simental. Call transferred at this time.    Electronically signed by Manju Marroquin at 24 1704       Roger Simental MD at 24 1657           EMERGENCY DEPARTMENT ENCOUNTER    Pt Name: Trang Maynard  MRN: [id number]  Pt :   1955  Room Number:  15/15  Date of encounter:  2024  PCP: Rod Vance MD  ED Provider: Roger Simental MD    Historian: Patient      HPI:  Chief Complaint   Patient presents with    Fall     EMS reports ground level fall about 1 hour ago. Pt does not take blood thinner and denies any LOC. C/o left hip pain           Context: Trang Maynard is a 69 y.o. female who presents to the ED c/o left hip pain, the patient tripped while walking on the couch, landing on her left hip.  She reports significant pain in the lower left extremity, denies head injury, neck pain.  Denies blood thinners.  Unable to bear weight on the left lower extremity due to pain      PAST MEDICAL HISTORY  Past Medical History:   Diagnosis Date    Anxiety     Arthritis     CAD (coronary artery disease)     stent     Chronic kidney disease     SEES DR. MIRANDA    Constipation     Depression     Diabetes mellitus     Diarrhea     Dysphagia     SPOUSE REPORTS VERY MILD.    Elevated cholesterol     Gastric ulcer     GERD (gastroesophageal reflux disease)     Heart disease     History of chest pain     SPOUSE REPORTS APPROXIMATLEY FEW MONTHS AGO AND THAT PATIENT HAD WORK UP WITH DR MAGANA.  STATES HAD NUCLEAR STRESS AND IT WAS WNL.      Hyperlipidemia     Hypertension     Hypokalemia     Kidney stone     Low sodium levels 2017    Psychosis      PVD (peripheral vascular disease)     Schizophrenia     SPOUSE REPORTED    Seizures 01/2016    ONLY HAD ONE EPISODE.  STATED SIDE EFFECTS OF MEDICATION    Sleep apnea     SPOUSE REPORTS PATIENT WAS NOT ABLE TO TOLERATE THE CPAP.      Thyroid condition     TIA (transient ischemic attack)     2010.  SPOUSE REPORTS NO RESIDUAL PROBLEMS.    Tremors of nervous system     Poss. r/t medications pt is on.    Uterine fibroid     Vitamin B12 deficiency          PAST SURGICAL HISTORY  Past Surgical History:   Procedure Laterality Date    CARDIAC CATHETERIZATION      SPOUSE REPORTS 2005 AND 2007 (REPORTS A TOTAL OF 4 STENTS WERE PLACED)    CARDIAC SURGERY      SPOUSE REPORTS 2 STENTS PLACED IN 2005 AND 2 STENTS IN 2007    COLONOSCOPY N/A 5/31/2018    Procedure: COLONOSCOPY with cold biopsy polypectomies and biopsies;  Surgeon: Servando Rooney MD;  Location: [facility] ENDOSCOPY;  Service: Gastroenterology    CYST REMOVAL      SPOUSE REPORTS REMOVED FROM NECK    ENDOSCOPY N/A 9/28/2017    Procedure: ESOPHAGOGASTRODUODENOSCOPY WITH BIOPSIES;  Surgeon: Servando Rooney MD;  Location: [facility] ENDOSCOPY;  Service:     ENDOSCOPY N/A 1/25/2018    Procedure: ESOPHAGOGASTRODUODENOSCOPY with biopsies;  Surgeon: Servando Rooney MD;  Location: [facility] ENDOSCOPY;  Service:     ENDOSCOPY      ENDOSCOPY N/A 7/26/2018    Procedure: ESOPHAGOGASTRODUODENOSCOPY WITH BIOPSIES;  Surgeon: Servando Rooney MD;  Location: [facility] ENDOSCOPY;  Service: Gastroenterology    OTHER SURGICAL HISTORY      SPOUSE REPORTS PATIENT ATTEMPTED COLONOSCOPY BEFORE BUT PREP WAS NOT COMPLETE    TUBAL ABDOMINAL LIGATION           FAMILY HISTORY  Family History   Problem Relation Age of Onset    Heart disease Mother     Hypertension Mother     Alcohol abuse Father     Colon cancer Neg Hx          SOCIAL HISTORY  Social History     Socioeconomic History    Marital status:    Tobacco Use    Smoking status: Former     Current packs/day: 0.00     Average packs/day: 0.3  "packs/day for 5.0 years (1.3 ttl pk-yrs)     Types: Cigarettes     Start date: 1987     Quit date: 1992     Years since quittin.6    Smokeless tobacco: Never    Tobacco comments:     SPOUSE REPORTS \"SHE DIDN'T INHALE\"   Vaping Use    Vaping status: Never Used   Substance and Sexual Activity    Alcohol use: No    Drug use: No    Sexual activity: Defer         ALLERGIES  Penicillins        REVIEW OF SYSTEMS  Review of Systems   Constitutional:  Negative for chills and fever.   HENT:  Negative for sore throat and trouble swallowing.    Eyes:  Negative for pain and redness.   Respiratory:  Negative for cough and shortness of breath.    Cardiovascular:  Negative for chest pain and leg swelling.   Gastrointestinal:  Negative for abdominal pain, nausea and vomiting.   Genitourinary:  Negative for dysuria and urgency.   Musculoskeletal:  Negative for back pain and neck pain.        Left hip pain   Skin:  Negative for rash and wound.   Neurological:  Negative for dizziness and weakness.        All systems reviewed and negative except for those discussed in HPI.       PHYSICAL EXAM    I have reviewed the triage vital signs and nursing notes.    ED Triage Vitals [24 1551]   Temp Heart Rate Resp BP SpO2   97.9 °F (36.6 °C) 89 22 (!) 145/109 100 %      Temp src Heart Rate Source Patient Position BP Location FiO2 (%)   -- -- -- -- --       Physical Exam  Constitutional:       Appearance: Normal appearance. She is not ill-appearing.   HENT:      Head: Normocephalic and atraumatic.      Right Ear: External ear normal.      Left Ear: External ear normal.      Nose: Nose normal.      Mouth/Throat:      Mouth: Mucous membranes are moist.      Pharynx: Oropharynx is clear.   Eyes:      Extraocular Movements: Extraocular movements intact.      Conjunctiva/sclera: Conjunctivae normal.      Pupils: Pupils are equal, round, and reactive to light.   Cardiovascular:      Rate and Rhythm: Normal rate and regular rhythm. "      Pulses:           Radial pulses are 2+ on the right side and 2+ on the left side.   Pulmonary:      Effort: Pulmonary effort is normal.      Breath sounds: Normal breath sounds.   Abdominal:      General: There is no distension.      Palpations: Abdomen is soft.      Tenderness: There is no abdominal tenderness.   Musculoskeletal:         General: No tenderness or deformity. Normal range of motion.      Cervical back: Normal range of motion and neck supple.      Left hip: Deformity, tenderness and bony tenderness present.      Right lower leg: No edema.      Left lower leg: No edema.   Skin:     General: Skin is warm and dry.      Capillary Refill: Capillary refill takes less than 2 seconds.   Neurological:      General: No focal deficit present.      Mental Status: She is alert and oriented to person, place, and time.      Motor: Tremor present.            LAB RESULTS  Recent Results (from the past 24 hour(s))   Protime-INR    Collection Time: 05/08/24  4:46 PM    Specimen: Blood   Result Value Ref Range    Protime 13.9 12.3 - 15.1 Seconds    INR 1.02 0.90 - 1.10   CBC Auto Differential    Collection Time: 05/08/24  4:46 PM    Specimen: Blood   Result Value Ref Range    WBC 10.96 (H) 3.40 - 10.80 10*3/mm3    RBC 4.24 3.77 - 5.28 10*6/mm3    Hemoglobin 12.6 12.0 - 15.9 g/dL    Hematocrit 37.1 34.0 - 46.6 %    MCV 87.5 79.0 - 97.0 fL    MCH 29.7 26.6 - 33.0 pg    MCHC 34.0 31.5 - 35.7 g/dL    RDW 14.3 12.3 - 15.4 %    RDW-SD 46.1 37.0 - 54.0 fl    MPV 9.7 6.0 - 12.0 fL    Platelets 183 140 - 450 10*3/mm3    Neutrophil % 84.4 (H) 42.7 - 76.0 %    Lymphocyte % 8.8 (L) 19.6 - 45.3 %    Monocyte % 5.9 5.0 - 12.0 %    Eosinophil % 0.1 (L) 0.3 - 6.2 %    Basophil % 0.3 0.0 - 1.5 %    Immature Grans % 0.5 0.0 - 0.5 %    Neutrophils, Absolute 9.25 (H) 1.70 - 7.00 10*3/mm3    Lymphocytes, Absolute 0.96 0.70 - 3.10 10*3/mm3    Monocytes, Absolute 0.65 0.10 - 0.90 10*3/mm3    Eosinophils, Absolute 0.01 0.00 - 0.40 10*3/mm3     Basophils, Absolute 0.03 0.00 - 0.20 10*3/mm3    Immature Grans, Absolute 0.06 (H) 0.00 - 0.05 10*3/mm3    nRBC 0.0 0.0 - 0.2 /100 WBC       If labs were ordered, I independently reviewed the results and considered them in treating the patient.        RADIOLOGY  XR Femur 2 View Left    Result Date: 5/8/2024  PROCEDURE: XR FEMUR 2 VW LEFT-  HISTORY: fall, pain, deformity  FINDINGS:  Two views show, fracture of the left femoral neck.  There is moderate displacement.  The joint spaces appear normal.       Left femoral neck fracture as above.    This report was signed and finalized on 5/8/2024 4:59 PM by Jeremi Rodriguez MD.      XR Hip With or Without Pelvis 2 - 3 View Left    Result Date: 5/8/2024  PROCEDURE: XR HIP W OR WO PELVIS 2-3 VIEW LEFT-  HISTORY: Fall, pain, deformity  FINDINGS:  Two views show fracture of the left femoral neck.  There is moderate displacement. It is difficult to assess whether the fracture subcapital or mid cervical.  The joint spaces appear normal.       Moderately displaced femoral neck fracture as above.    This report was signed and finalized on 5/8/2024 4:45 PM by Jeremi Rodriguez MD.      XR Chest 1 View    Result Date: 5/8/2024  PROCEDURE: XR CHEST 1 VW-  HISTORY: fall, pain  COMPARISON: 4/26/2017  FINDINGS:  Portable view of the chest demonstrates the lungs to be grossly clear. There is no evidence of effusion, pneumothorax or other significant pleural disease. Patient is status post CABG. No obvious rib fracture is present.  The heart size is normal.      Unremarkable portable chest.    This report was signed and finalized on 5/8/2024 4:42 PM by Jeremi Rodriguez MD.           PROCEDURES    Procedures    Interpretations    O2 Sat: The patients oxygen saturation was 100% on Room Air.  This was independently interpreted by me as Normal    EKG: I reviewed and independently interpreted the EKG as sinus rhythm at 75 bpm, normal axis, normal intervals, no ST elevation, no T wave  inversion    Cardiac Monitoring: I reviewed and independently interpreted the Rhythm Strip as Normal Sinus rhythm rate of 89    Radiology: I ordered and independently reviewed the above noted radiographic studies.  I viewed images of Xray of the left hip and left femur  which showed  femoral neck fracture  per my independent interpretation. See radiologist's dictation for official interpretation.     Radiology: I ordered and independently reviewed the above noted radiographic studies.  I viewed images of Chest Xray which showed No pulmonary process per my independent interpretation. See radiologist's dictation for official interpretation      MEDICATIONS GIVEN IN ER    Medications   sodium chloride 0.9 % flush 10 mL (has no administration in time range)   morphine injection 4 mg (4 mg Intravenous Given 5/8/24 1638)   ondansetron (ZOFRAN) injection 4 mg (4 mg Intravenous Given 5/8/24 1637)         MEDICAL DECISION MAKING, PROGRESS, and CONSULTS    All labs, if obtained, have been independently reviewed by me.  All radiology studies, if obtained, have been reviewed by me and the radiologist dictating the report.  All EKG's, if obtained, have been independently viewed and interpreted by me      Discussion below represents my analysis of pertinent findings related to patient's condition, differential diagnosis, treatment plan and final disposition.      Differential diagnosis:    69-year-old female presented ED with complaint of left hip pain after fall, she has deformity left lower extremity, suspect she likely has left hip fracture, she no head injury, no neck pain, she not take anticoagulants, clinically cleared from that standpoint.  Will obtain x-rays of the left hip, left femur, as well as preop labs    Additional Sources:  External (non-ED) record review:  Cardiology visit 7/18/2023 for stable CAD       Orders placed during this visit:  Orders Placed This Encounter   Procedures    XR Hip With or Without Pelvis 2  - 3 View Left    XR Femur 2 View Left    XR Chest 1 View    Comprehensive Metabolic Panel    Protime-INR    CBC Auto Differential    ECG 12 Lead Pre-Op / Pre-Procedure    Insert Peripheral IV    Inpatient Admission    CBC & Differential         Additional orders considered but not ordered:  None    ED Course:    Consultants:  Hospitalist and Dr Hong [facility]    ED Course as of 05/08/24 1712   Wed May 08, 2024   1617 XR Hip With or Without Pelvis 2 - 3 View Left [KH]   1657 Patient with acute left femoral neck fracture, consistent with her presentation, will contact hospitalist for admission and orthopedics for evaluation [CS]   1704 Spoke to Dr. Hong agrees to evaluate patient for surgical management [CS]   1705 Spoke to Dr. Vigil agrees to evaluate patient for admission [CS]      ED Course User Index  [CS] Roger Simental MD  [KH] Tomas Estevez APRN           After my consideration of clinical presentation and any laboratory/radiology studies obtained, I discussed the findings with the patient/patient representative who is in agreement with the treatment plan and the final disposition. Risks and benefits of admission was discussed     AS OF 17:12 EDT VITALS:    BP - (!) 145/109  HR - 89  TEMP - 97.9 °F (36.6 °C)  O2 SATS - 100%    I reviewed the patients prescription monitoring report if available prior to discharge    DIAGNOSIS  Final diagnoses:   Left displaced femoral neck fracture         DISPOSITION  ED Disposition       ED Disposition   Decision to Admit    Condition   --    Comment   Level of Care: Med/Surg [1]   Diagnosis: Femoral neck fracture [195717]   Admitting Physician: JORDAN VIGIL [[id number]]   Attending Physician: JORDAN VIGIL [[id number]]   Isolate for COVID?: No [0]   Certification: I Certify That Inpatient Hospital Services Are Medically Necessary For Greater Than 2 Midnights                     Please note that portions of this document were completed with voice recognition software.         Roger Simental MD  05/08/24 1712      Electronically signed by Roger Simental MD at 05/08/24 1712       Vital Signs (last day)       Date/Time Temp Temp src Pulse Resp BP Patient Position SpO2    05/09/24 0846 98.3 (36.8) Oral 70 18 175/90 Lying 99    05/09/24 0350 98.2 (36.8) Oral 83 22 159/75 Lying 96    05/09/24 0227 -- -- -- -- -- -- 96    05/09/24 0003 98.2 (36.8) Oral 77 14 134/82 Lying 99    05/08/24 2330 -- -- 74 12 170/88 -- 98    05/08/24 2300 -- -- 68 -- 171/82 -- 98    05/08/24 2230 -- -- 75 -- 164/79 -- 99    05/08/24 2208 -- -- 77 9 187/85 Lying 99    05/08/24 2100 -- -- 78 -- 170/87 -- 95    05/08/24 2058 -- -- 77 -- 156/84 -- 96    05/08/24 2030 -- -- 75 -- 173/84 -- 96    05/08/24 2000 -- -- 73 8 171/90 Lying 94    05/08/24 1930 -- -- 70 -- 169/91 -- 93    05/08/24 1900 -- -- 80 -- 184/97 Lying 95    05/08/24 1830 -- -- 86 -- 186/99 -- 97    05/08/24 1800 -- -- 71 -- 191/90 -- 96    05/08/24 1730 -- -- 70 -- 187/101 -- 97    05/08/24 1551 97.9 (36.6) -- 89 22 145/109 -- 100          Current Facility-Administered Medications   Medication Dose Route Frequency Provider Last Rate Last Admin    aspirin EC tablet 81 mg  81 mg Oral Daily Rod Vance MD   81 mg at 05/08/24 2006    atorvastatin (LIPITOR) tablet 80 mg  80 mg Oral Daily Rod Vance MD   80 mg at 05/08/24 2006    clopidogrel (PLAVIX) tablet 75 mg  75 mg Oral Daily Rod Vance MD   75 mg at 05/08/24 2006    HYDROcodone-acetaminophen (NORCO) 5-325 MG per tablet 1 tablet  1 tablet Oral Q6H PRN Rod Vance MD   1 tablet at 05/08/24 2033    levothyroxine (SYNTHROID, LEVOTHROID) tablet 25 mcg  25 mcg Oral Daily Roger Simental MD        lisinopril (PRINIVIL,ZESTRIL) tablet 40 mg  40 mg Oral Q24H Rod Vance MD   40 mg at 05/08/24 2006    metoprolol tartrate (LOPRESSOR) tablet 50 mg  50 mg Oral Q12H Rod Vance MD   50 mg at 05/08/24 2006    Morphine sulfate (PF) injection 2 mg  2 mg  Intravenous Q3H PRN Rod Vance MD   2 mg at 05/09/24 1005    nitroglycerin (NITROSTAT) SL tablet 0.4 mg  0.4 mg Sublingual Q5 Min PRN Rod Vance MD        ondansetron (ZOFRAN) injection 4 mg  4 mg Intravenous Q6H PRN Kerley, Brian Joseph, DO   4 mg at 05/09/24 0054    pantoprazole (PROTONIX) EC tablet 40 mg  40 mg Oral Daily Rod Vance MD   40 mg at 05/08/24 2006    prochlorperazine (COMPAZINE) injection 2.5 mg  2.5 mg Intravenous Q6H PRN Kerley, Brian Joseph, DO   2.5 mg at 05/09/24 0504    sodium chloride 0.9 % flush 10 mL  10 mL Intravenous PRN Roger Simental MD        sodium chloride 0.9 % infusion  50 mL/hr Intravenous Continuous Rod Vance MD 50 mL/hr at 05/08/24 2006 50 mL/hr at 05/08/24 2006     Lab Results (last 24 hours)       Procedure Component Value Units Date/Time    TSH [[id number]]  (Abnormal) Collected: 05/09/24 0556    Specimen: Blood Updated: 05/09/24 0654     TSH 6.260 uIU/mL     Comprehensive Metabolic Panel [[id number]]  (Abnormal) Collected: 05/09/24 0556    Specimen: Blood Updated: 05/09/24 0648     Glucose 129 mg/dL      BUN 8 mg/dL      Creatinine 0.79 mg/dL      Sodium 128 mmol/L      Potassium 4.0 mmol/L      Chloride 90 mmol/L      CO2 24.8 mmol/L      Calcium 9.4 mg/dL      Total Protein 6.4 g/dL      Albumin 3.9 g/dL      ALT (SGPT) 15 U/L      AST (SGOT) 23 U/L      Alkaline Phosphatase 84 U/L      Total Bilirubin 0.9 mg/dL      Globulin 2.5 gm/dL      A/G Ratio 1.6 g/dL      BUN/Creatinine Ratio 10.1     Anion Gap 13.2 mmol/L      eGFR 80.1 mL/min/1.73     Narrative:      GFR Normal >60  Chronic Kidney Disease <60  Kidney Failure <15    The GFR formula is only valid for adults with stable renal function between ages 18 and 70.    Hemoglobin A1c [[id number]]  (Abnormal) Collected: 05/09/24 0556    Specimen: Blood Updated: 05/09/24 0642     Hemoglobin A1C 6.00 %     Narrative:      Hemoglobin A1C Ranges:    Increased Risk for Diabetes  5.7% to  6.4%  Diabetes                     >= 6.5%  Diabetic Goal                < 7.0%    CBC & Differential [[id number]]  (Abnormal) Collected: 05/09/24 0556    Specimen: Blood Updated: 05/09/24 0631    Narrative:      The following orders were created for panel order CBC & Differential.  Procedure                               Abnormality         Status                     ---------                               -----------         ------                     CBC Auto Differential[[id number]]        Abnormal            Final result                 Please view results for these tests on the individual orders.    CBC Auto Differential [[id number]]  (Abnormal) Collected: 05/09/24 0556    Specimen: Blood Updated: 05/09/24 0631     WBC 13.64 10*3/mm3      RBC 4.12 10*6/mm3      Hemoglobin 12.2 g/dL      Hematocrit 36.1 %      MCV 87.6 fL      MCH 29.6 pg      MCHC 33.8 g/dL      RDW 14.4 %      RDW-SD 46.2 fl      MPV 9.9 fL      Platelets 176 10*3/mm3      Neutrophil % 87.1 %      Lymphocyte % 6.0 %      Monocyte % 6.4 %      Eosinophil % 0.0 %      Basophil % 0.2 %      Immature Grans % 0.3 %      Neutrophils, Absolute 11.88 10*3/mm3      Lymphocytes, Absolute 0.82 10*3/mm3      Monocytes, Absolute 0.87 10*3/mm3      Eosinophils, Absolute 0.00 10*3/mm3      Basophils, Absolute 0.03 10*3/mm3      Immature Grans, Absolute 0.04 10*3/mm3      nRBC 0.0 /100 WBC     T4, Free [[id number]]  (Normal) Collected: 05/08/24 1646    Specimen: Blood Updated: 05/08/24 2001     Free T4 1.40 ng/dL     Narrative:      Results may be falsely increased if patient taking Biotin.      Comprehensive Metabolic Panel [[id number]]  (Abnormal) Collected: 05/08/24 1646    Specimen: Blood Updated: 05/08/24 1714     Glucose 109 mg/dL      BUN 8 mg/dL      Creatinine 0.84 mg/dL      Sodium 128 mmol/L      Potassium 4.1 mmol/L      Chloride 90 mmol/L      CO2 27.2 mmol/L      Calcium 9.0 mg/dL      Total Protein 6.9 g/dL      Albumin 4.2 g/dL      ALT (SGPT) 17  U/L      AST (SGOT) 22 U/L      Alkaline Phosphatase 101 U/L      Total Bilirubin 0.6 mg/dL      Globulin 2.7 gm/dL      A/G Ratio 1.6 g/dL      BUN/Creatinine Ratio 9.5     Anion Gap 10.8 mmol/L      eGFR 74.4 mL/min/1.73     Narrative:      GFR Normal >60  Chronic Kidney Disease <60  Kidney Failure <15    The GFR formula is only valid for adults with stable renal function between ages 18 and 70.    Protime-INR [[id number]]  (Normal) Collected: 05/08/24 1646    Specimen: Blood Updated: 05/08/24 1710     Protime 13.9 Seconds      INR 1.02    Narrative:      Suggested INR therapeutic range for stable oral anticoagulant therapy:    Low Intensity therapy:   1.5-2.0  Moderate Intensity therapy:   2.0-3.0  High Intensity therapy:   2.5-4.0    CBC & Differential [[id number]]  (Abnormal) Collected: 05/08/24 1646    Specimen: Blood Updated: 05/08/24 1651    Narrative:      The following orders were created for panel order CBC & Differential.  Procedure                               Abnormality         Status                     ---------                               -----------         ------                     CBC Auto Differential[[id number]]        Abnormal            Final result                 Please view results for these tests on the individual orders.    CBC Auto Differential [[id number]]  (Abnormal) Collected: 05/08/24 1646    Specimen: Blood Updated: 05/08/24 1651     WBC 10.96 10*3/mm3      RBC 4.24 10*6/mm3      Hemoglobin 12.6 g/dL      Hematocrit 37.1 %      MCV 87.5 fL      MCH 29.7 pg      MCHC 34.0 g/dL      RDW 14.3 %      RDW-SD 46.1 fl      MPV 9.7 fL      Platelets 183 10*3/mm3      Neutrophil % 84.4 %      Lymphocyte % 8.8 %      Monocyte % 5.9 %      Eosinophil % 0.1 %      Basophil % 0.3 %      Immature Grans % 0.5 %      Neutrophils, Absolute 9.25 10*3/mm3      Lymphocytes, Absolute 0.96 10*3/mm3      Monocytes, Absolute 0.65 10*3/mm3      Eosinophils, Absolute 0.01 10*3/mm3      Basophils, Absolute  0.03 10*3/mm3      Immature Grans, Absolute 0.06 10*3/mm3      nRBC 0.0 /100 WBC           Imaging Results (Last 24 Hours)       Procedure Component Value Units Date/Time    XR Femur 2 View Left [[id number]] Collected: 05/08/24 1642     Updated: 05/08/24 1701    Narrative:      PROCEDURE: XR FEMUR 2 VW LEFT-     HISTORY: fall, pain, deformity     FINDINGS:  Two views show, fracture of the left femoral neck.  There is  moderate displacement.     The joint spaces appear normal.          Impression:      Left femoral neck fracture as above.           This report was signed and finalized on 5/8/2024 4:59 PM by Jeremi Rodriguez MD.       XR Hip With or Without Pelvis 2 - 3 View Left [[id number]] Collected: 05/08/24 1644     Updated: 05/08/24 1647    Narrative:      PROCEDURE: XR HIP W OR WO PELVIS 2-3 VIEW LEFT-     HISTORY: Fall, pain, deformity     FINDINGS:  Two views show fracture of the left femoral neck.  There is  moderate displacement. It is difficult to assess whether the fracture  subcapital or mid cervical.     The joint spaces appear normal.          Impression:      Moderately displaced femoral neck fracture as above.           This report was signed and finalized on 5/8/2024 4:45 PM by Jeremi Rodriguez MD.       XR Chest 1 View [[id number]] Collected: 05/08/24 1642     Updated: 05/08/24 1645    Narrative:      PROCEDURE: XR CHEST 1 VW-     HISTORY: fall, pain     COMPARISON: 4/26/2017     FINDINGS:  Portable view of the chest demonstrates the lungs to be  grossly clear. There is no evidence of effusion, pneumothorax or other  significant pleural disease. Patient is status post CABG. No obvious rib  fracture is present.     The heart size is normal.       Impression:      Unremarkable portable chest.            This report was signed and finalized on 5/8/2024 4:42 PM by Jeremi Rodriguez MD.             Physician Progress Notes (last 24 hours)  Notes from 05/08/24 1239 through 05/09/24 1239   No notes of this type  exist for this encounter.          Consult Notes (last 24 hours)        Edmar Thomas MD at 05/09/24 1018        Consult Orders    1. Inpatient Cardiology Consult [[id number]] ordered by Rod Vance MD at 05/08/24 1818                 [facility]-Cardiology Consult Note    Referring Provider: Rjaiv  Reason for Consultation: Preoperative cardiovascular risk assessment    Patient Care Team:  Rod Vance MD as PCP - General (Internal Medicine)  Gael Baker MD as Consulting Physician (Cardiology)    Chief complaint : Hip pain    Subjective:    History of present illness: This is a 69-year-old female patient who is brought to the emergency room after a fall sustaining a hip fracture.  She is scheduled to have orthopedic surgery later today.  The patient has a history of coronary artery disease with prior three-vessel CABG.  She recently underwent a vasodilator nuclear stress test showing a large area of lateral wall ischemia.  The patient underwent invasive coronary angiography from a femoral approach 2 days ago at [facility] demonstrating subtotal occlusion of the LAD.  She was demonstrated to have a chronic total occlusion of the ostium of the circumflex with suspected microchannel revascularization.  PAUL II flow was noted in the vessel.  She was demonstrated to have a patent RCA with no focal obstructive disease.  Left internal mammary graft to the LAD was patent.  Saphenous vein graft to the obtuse marginal branch and right coronary arteries were both chronically occluded.  Plans were being arranged for the patient to see another interventional cardiologist in the same group who specializes in  revascularization.  I have spoken with her cardiologist at [facility] who feels that she is at acceptable risk of proceeding with orthopedic surgery at this facility.  If the patient develops a postoperative cardiovascular complication, the patient will be transferred to [facility].    Review of Systems   Review of Systems   Constitutional: Negative for chills, diaphoresis, fever, malaise/fatigue, weight gain and weight loss.   HENT:  Negative for ear discharge, hearing loss, hoarse voice and nosebleeds.    Eyes:  Negative for discharge, double vision, pain and photophobia.   Cardiovascular:  Negative for chest pain, claudication, cyanosis, dyspnea on exertion, irregular heartbeat, leg swelling, near-syncope, orthopnea, palpitations, paroxysmal nocturnal dyspnea and syncope.   Respiratory:  Negative for cough, hemoptysis, sputum production and wheezing.    Endocrine: Negative for cold intolerance, heat intolerance, polydipsia, polyphagia and polyuria.   Hematologic/Lymphatic: Negative for adenopathy and bleeding problem. Does not bruise/bleed easily.   Skin:  Negative for color change, flushing, itching and rash.   Musculoskeletal:  Positive for joint pain. Negative for muscle cramps, muscle weakness, myalgias and stiffness.   Gastrointestinal:  Negative for abdominal pain, diarrhea, hematemesis, hematochezia, nausea and vomiting.   Genitourinary:  Negative for dysuria, frequency and nocturia.   Neurological:  Negative for focal weakness, loss of balance, numbness, paresthesias and seizures.   Psychiatric/Behavioral:  Negative for altered mental status, hallucinations and suicidal ideas.    Allergic/Immunologic: Negative for HIV exposure, hives and persistent infections.       History  Past Medical History:   Diagnosis Date    Anxiety     Arthritis     CAD (coronary artery disease)     stent 2007    Chronic kidney disease     SEES DR. MIRANDA    Constipation     Depression     Diabetes mellitus     Diarrhea     Dysphagia     SPOUSE REPORTS VERY MILD.    Elevated cholesterol     Gastric ulcer     GERD (gastroesophageal reflux disease)     Heart disease     History of chest pain     SPOUSE REPORTS JOSE ALBERTOATLEY FEW MONTHS AGO AND THAT PATIENT HAD WORK UP WITH DR MAGANA.   STATES HAD NUCLEAR STRESS AND IT WAS WNL.      Hyperlipidemia     Hypertension     Hypokalemia     Kidney stone     Low sodium levels 2017    Psychosis     PVD (peripheral vascular disease)     Schizophrenia     SPOUSE REPORTED    Seizures 01/2016    ONLY HAD ONE EPISODE.  STATED SIDE EFFECTS OF MEDICATION    Sleep apnea     SPOUSE REPORTS PATIENT WAS NOT ABLE TO TOLERATE THE CPAP.      Thyroid condition     TIA (transient ischemic attack)     2010.  SPOUSE REPORTS NO RESIDUAL PROBLEMS.    Tremors of nervous system     Poss. r/t medications pt is on.    Uterine fibroid     Vitamin B12 deficiency    ,   Past Surgical History:   Procedure Laterality Date    CARDIAC CATHETERIZATION      SPOUSE REPORTS 2005 AND 2007 (REPORTS A TOTAL OF 4 STENTS WERE PLACED)    CARDIAC SURGERY      SPOUSE REPORTS 2 STENTS PLACED IN 2005 AND 2 STENTS IN 2007    COLONOSCOPY N/A 5/31/2018    Procedure: COLONOSCOPY with cold biopsy polypectomies and biopsies;  Surgeon: Servando Rooney MD;  Location: [facility] ENDOSCOPY;  Service: Gastroenterology    CYST REMOVAL      SPOUSE REPORTS REMOVED FROM NECK    ENDOSCOPY N/A 9/28/2017    Procedure: ESOPHAGOGASTRODUODENOSCOPY WITH BIOPSIES;  Surgeon: Servando Rooney MD;  Location: [facility] ENDOSCOPY;  Service:     ENDOSCOPY N/A 1/25/2018    Procedure: ESOPHAGOGASTRODUODENOSCOPY with biopsies;  Surgeon: Servando Rooney MD;  Location: [facility] ENDOSCOPY;  Service:     ENDOSCOPY      ENDOSCOPY N/A 7/26/2018    Procedure: ESOPHAGOGASTRODUODENOSCOPY WITH BIOPSIES;  Surgeon: Servando Rooney MD;  Location: [facility] ENDOSCOPY;  Service: Gastroenterology    OTHER SURGICAL HISTORY      SPOUSE REPORTS PATIENT ATTEMPTED COLONOSCOPY BEFORE BUT PREP WAS NOT COMPLETE    TUBAL ABDOMINAL LIGATION     ,   Family History   Problem Relation Age of Onset    Heart disease Mother     Hypertension Mother     Alcohol abuse Father     Colon cancer Neg Hx    ,   Social History     Tobacco Use    Smoking status: Former     Current packs/day:  "0.00     Average packs/day: 0.3 packs/day for 5.0 years (1.3 ttl pk-yrs)     Types: Cigarettes     Start date: 1987     Quit date: 1992     Years since quittin.6    Smokeless tobacco: Never    Tobacco comments:     SPOUSE REPORTS \"SHE DIDN'T INHALE\"   Vaping Use    Vaping status: Never Used   Substance Use Topics    Alcohol use: No    Drug use: No   ,   Medications Prior to Admission   Medication Sig Dispense Refill Last Dose    aspirin 81 MG EC tablet Take 1 tablet by mouth Daily.       atorvastatin (LIPITOR) 80 MG tablet Take 1 tablet by mouth Daily.  5     benztropine (COGENTIN) 0.5 MG tablet Take 1 tablet by mouth Daily.  2     clopidogrel (PLAVIX) 75 MG tablet Take 1 tablet by mouth Daily.       dicyclomine (BENTYL) 20 MG tablet Take 1 tablet by mouth 3 (Three) Times a Day As Needed (lower abdominal pain and diarrhea). (Patient not taking: Reported on 2023) 30 tablet 2     levothyroxine (SYNTHROID, LEVOTHROID) 25 MCG tablet Take 1 tablet by mouth Daily.  3     lisinopril (PRINIVIL,ZESTRIL) 20 MG tablet Take 1 tablet by mouth Daily.       metFORMIN (GLUCOPHAGE) 500 MG tablet Take 500 mg by mouth 2 (Two) Times a Day With Meals. (Patient not taking: Reported on 2023)  3     metoprolol tartrate (LOPRESSOR) 100 MG tablet Take 50 mg by mouth 2 (Two) Times a Day. She takes 1/2 tab twice daily (Patient not taking: Reported on 2023)  0     metoprolol tartrate (LOPRESSOR) 50 MG tablet Take 1 tablet by mouth Every 12 (Twelve) Hours.       ondansetron ODT (ZOFRAN-ODT) 4 MG disintegrating tablet Place 1 tablet on the tongue Every 8 (Eight) Hours As Needed for Nausea or Vomiting. 30 tablet 1     Paliperidone Palmitate (INVEGA TRINZA) 546 MG/1.75ML suspension IM injection Inject 1.75 mL into the appropriate muscle as directed by prescriber Every 3 (Three) Months.       pantoprazole (PROTONIX) 40 MG EC tablet TAKE 1 TABLET BY MOUTH DAILY HALF AN HOUR BEFORE BREAKFAST (Patient not taking: Reported " "on 7/18/2023) 30 tablet 6     and Allergies:  Penicillins    Objective:    Vital Sign Min/Max for last 24 hours  Temp  Min: 97.9 °F (36.6 °C)  Max: 98.3 °F (36.8 °C)   BP  Min: 134/82  Max: 191/90   Pulse  Min: 68  Max: 89   Resp  Min: 8  Max: 22   SpO2  Min: 93 %  Max: 100 %   Flow (L/min)  Min: 2  Max: 2   Weight  Min: 58.4 kg (128 lb 12 oz)  Max: 68 kg (150 lb)     Flowsheet Rows      Flowsheet Row First Filed Value   Admission Height 167.6 cm (66\") Documented at 05/08/2024 1551   Admission Weight 68 kg (150 lb) Documented at 05/08/2024 1551                 Physical Exam:   Vitals and nursing note reviewed.   Constitutional:       Appearance: Frail. Chronically ill-appearing.   Neck:      Vascular: No JVR. JVD normal.   Pulmonary:      Effort: Pulmonary effort is normal.      Breath sounds: Normal breath sounds. No wheezing. No rhonchi. No rales.   Chest:      Chest wall: Not tender to palpatation.   Cardiovascular:      PMI at left midclavicular line. Normal rate. Regular rhythm. Normal S1. Normal S2.       Murmurs: There is no murmur.      No gallop.  No click. No rub.   Pulses:     Intact distal pulses.   Edema:     Peripheral edema absent.   Abdominal:      General: Bowel sounds are normal.      Palpations: Abdomen is soft.      Tenderness: There is no abdominal tenderness.   Musculoskeletal: Normal range of motion.         General: No tenderness. Skin:     General: Skin is warm and dry.   Neurological:      General: No focal deficit present.      Mental Status: Alert and oriented to person, place and time.         Results Review:   I reviewed the patient's new clinical results.  Results from last 7 days   Lab Units 05/09/24  0556 05/08/24  1646   WBC 10*3/mm3 13.64* 10.96*   HEMOGLOBIN g/dL 12.2 12.6   HEMATOCRIT % 36.1 37.1   PLATELETS 10*3/mm3 176 183     Results from last 7 days   Lab Units 05/09/24  0556 05/08/24  1646   SODIUM mmol/L 128* 128*   POTASSIUM mmol/L 4.0 4.1   CHLORIDE mmol/L 90* 90*   CO2 " "mmol/L 24.8 27.2   BUN mg/dL 8 8   CREATININE mg/dL 0.79 0.84   GLUCOSE mg/dL 129* 109*   CALCIUM mg/dL 9.4 9.0     No results found for: \"TROPONINT\"          Assessment/Plan:      Femoral neck fracture    Anxiety    Dyslipidemia    Pill rolling tremors    Depression    Essential hypertension    Gastroesophageal reflux disease with esophagitis without hemorrhage    CAD (coronary artery disease)      The patient is considered acceptable risk of a cardiovascular complication from planned orthopedic surgery.    I discussed the patient's findings and my recommendations with patient, family, and consulting provider    Edmar Thomas MD  05/09/24  10:19 EDT            Electronically signed by Edmar Thomas MD at 05/09/24 1023       "

## 2024-05-09 NOTE — CONSULTS
[facility]-Cardiology Consult Note    Referring Provider: Rajiv  Reason for Consultation: Preoperative cardiovascular risk assessment    Patient Care Team:  Rod Vance MD as PCP - General (Internal Medicine)  Gael Baker MD as Consulting Physician (Cardiology)    Chief complaint : Hip pain    Subjective:    History of present illness: This is a 69-year-old female patient who is brought to the emergency room after a fall sustaining a hip fracture.  She is scheduled to have orthopedic surgery later today.  The patient has a history of coronary artery disease with prior three-vessel CABG.  She recently underwent a vasodilator nuclear stress test showing a large area of lateral wall ischemia.  The patient underwent invasive coronary angiography from a femoral approach 2 days ago at [facility] demonstrating subtotal occlusion of the LAD.  She was demonstrated to have a chronic total occlusion of the ostium of the circumflex with suspected microchannel revascularization.  PAUL II flow was noted in the vessel.  She was demonstrated to have a patent RCA with no focal obstructive disease.  Left internal mammary graft to the LAD was patent.  Saphenous vein graft to the obtuse marginal branch and right coronary arteries were both chronically occluded.  Plans were being arranged for the patient to see another interventional cardiologist in the same group who specializes in  revascularization.  I have spoken with her cardiologist at [facility] who feels that she is at acceptable risk of proceeding with orthopedic surgery at this facility.  If the patient develops a postoperative cardiovascular complication, the patient will be transferred to [facility].    Review of Systems   Review of Systems   Constitutional: Negative for chills, diaphoresis, fever, malaise/fatigue, weight gain and weight loss.   HENT:  Negative for ear discharge, hearing loss, hoarse voice and nosebleeds.     Eyes:  Negative for discharge, double vision, pain and photophobia.   Cardiovascular:  Negative for chest pain, claudication, cyanosis, dyspnea on exertion, irregular heartbeat, leg swelling, near-syncope, orthopnea, palpitations, paroxysmal nocturnal dyspnea and syncope.   Respiratory:  Negative for cough, hemoptysis, sputum production and wheezing.    Endocrine: Negative for cold intolerance, heat intolerance, polydipsia, polyphagia and polyuria.   Hematologic/Lymphatic: Negative for adenopathy and bleeding problem. Does not bruise/bleed easily.   Skin:  Negative for color change, flushing, itching and rash.   Musculoskeletal:  Positive for joint pain. Negative for muscle cramps, muscle weakness, myalgias and stiffness.   Gastrointestinal:  Negative for abdominal pain, diarrhea, hematemesis, hematochezia, nausea and vomiting.   Genitourinary:  Negative for dysuria, frequency and nocturia.   Neurological:  Negative for focal weakness, loss of balance, numbness, paresthesias and seizures.   Psychiatric/Behavioral:  Negative for altered mental status, hallucinations and suicidal ideas.    Allergic/Immunologic: Negative for HIV exposure, hives and persistent infections.       History  Past Medical History:   Diagnosis Date    Anxiety     Arthritis     CAD (coronary artery disease)     stent 2007    Chronic kidney disease     SEES DR. MIRANDA    Constipation     Depression     Diabetes mellitus     Diarrhea     Dysphagia     SPOUSE REPORTS VERY MILD.    Elevated cholesterol     Gastric ulcer     GERD (gastroesophageal reflux disease)     Heart disease     History of chest pain     SPOUSE REPORTS APPROXIMATLEY FEW MONTHS AGO AND THAT PATIENT HAD WORK UP WITH DR MAGANA.  STATES HAD NUCLEAR STRESS AND IT WAS WNL.      Hyperlipidemia     Hypertension     Hypokalemia     Kidney stone     Low sodium levels 2017    Psychosis     PVD (peripheral vascular disease)     Schizophrenia     SPOUSE REPORTED    Seizures 01/2016     ONLY HAD ONE EPISODE.  STATED SIDE EFFECTS OF MEDICATION    Sleep apnea     SPOUSE REPORTS PATIENT WAS NOT ABLE TO TOLERATE THE CPAP.      Thyroid condition     TIA (transient ischemic attack)     .  SPOUSE REPORTS NO RESIDUAL PROBLEMS.    Tremors of nervous system     Poss. r/t medications pt is on.    Uterine fibroid     Vitamin B12 deficiency    ,   Past Surgical History:   Procedure Laterality Date    CARDIAC CATHETERIZATION      SPOUSE REPORTS  AND  (REPORTS A TOTAL OF 4 STENTS WERE PLACED)    CARDIAC SURGERY      SPOUSE REPORTS 2 STENTS PLACED IN  AND 2 STENTS IN     COLONOSCOPY N/A 2018    Procedure: COLONOSCOPY with cold biopsy polypectomies and biopsies;  Surgeon: Servando Rooney MD;  Location: [facility] ENDOSCOPY;  Service: Gastroenterology    CYST REMOVAL      SPOUSE REPORTS REMOVED FROM NECK    ENDOSCOPY N/A 2017    Procedure: ESOPHAGOGASTRODUODENOSCOPY WITH BIOPSIES;  Surgeon: Servando Rooney MD;  Location: [facility] ENDOSCOPY;  Service:     ENDOSCOPY N/A 2018    Procedure: ESOPHAGOGASTRODUODENOSCOPY with biopsies;  Surgeon: Servando Rooney MD;  Location: [facility] ENDOSCOPY;  Service:     ENDOSCOPY      ENDOSCOPY N/A 2018    Procedure: ESOPHAGOGASTRODUODENOSCOPY WITH BIOPSIES;  Surgeon: Servando Rooney MD;  Location: [facility] ENDOSCOPY;  Service: Gastroenterology    OTHER SURGICAL HISTORY      SPOUSE REPORTS PATIENT ATTEMPTED COLONOSCOPY BEFORE BUT PREP WAS NOT COMPLETE    TUBAL ABDOMINAL LIGATION     ,   Family History   Problem Relation Age of Onset    Heart disease Mother     Hypertension Mother     Alcohol abuse Father     Colon cancer Neg Hx    ,   Social History     Tobacco Use    Smoking status: Former     Current packs/day: 0.00     Average packs/day: 0.3 packs/day for 5.0 years (1.3 ttl pk-yrs)     Types: Cigarettes     Start date: 1987     Quit date: 1992     Years since quittin.6    Smokeless tobacco: Never    Tobacco comments:     SPOUSE REPORTS  "\"SHE DIDN'T INHALE\"   Vaping Use    Vaping status: Never Used   Substance Use Topics    Alcohol use: No    Drug use: No   ,   Medications Prior to Admission   Medication Sig Dispense Refill Last Dose    aspirin 81 MG EC tablet Take 1 tablet by mouth Daily.       atorvastatin (LIPITOR) 80 MG tablet Take 1 tablet by mouth Daily.  5     benztropine (COGENTIN) 0.5 MG tablet Take 1 tablet by mouth Daily.  2     clopidogrel (PLAVIX) 75 MG tablet Take 1 tablet by mouth Daily.       dicyclomine (BENTYL) 20 MG tablet Take 1 tablet by mouth 3 (Three) Times a Day As Needed (lower abdominal pain and diarrhea). (Patient not taking: Reported on 7/18/2023) 30 tablet 2     levothyroxine (SYNTHROID, LEVOTHROID) 25 MCG tablet Take 1 tablet by mouth Daily.  3     lisinopril (PRINIVIL,ZESTRIL) 20 MG tablet Take 1 tablet by mouth Daily.       metFORMIN (GLUCOPHAGE) 500 MG tablet Take 500 mg by mouth 2 (Two) Times a Day With Meals. (Patient not taking: Reported on 7/18/2023)  3     metoprolol tartrate (LOPRESSOR) 100 MG tablet Take 50 mg by mouth 2 (Two) Times a Day. She takes 1/2 tab twice daily (Patient not taking: Reported on 7/18/2023)  0     metoprolol tartrate (LOPRESSOR) 50 MG tablet Take 1 tablet by mouth Every 12 (Twelve) Hours.       ondansetron ODT (ZOFRAN-ODT) 4 MG disintegrating tablet Place 1 tablet on the tongue Every 8 (Eight) Hours As Needed for Nausea or Vomiting. 30 tablet 1     Paliperidone Palmitate (INVEGA TRINZA) 546 MG/1.75ML suspension IM injection Inject 1.75 mL into the appropriate muscle as directed by prescriber Every 3 (Three) Months.       pantoprazole (PROTONIX) 40 MG EC tablet TAKE 1 TABLET BY MOUTH DAILY HALF AN HOUR BEFORE BREAKFAST (Patient not taking: Reported on 7/18/2023) 30 tablet 6     and Allergies:  Penicillins    Objective:    Vital Sign Min/Max for last 24 hours  Temp  Min: 97.9 °F (36.6 °C)  Max: 98.3 °F (36.8 °C)   BP  Min: 134/82  Max: 191/90   Pulse  Min: 68  Max: 89   Resp  Min: 8  Max: " "22   SpO2  Min: 93 %  Max: 100 %   Flow (L/min)  Min: 2  Max: 2   Weight  Min: 58.4 kg (128 lb 12 oz)  Max: 68 kg (150 lb)     Flowsheet Rows      Flowsheet Row First Filed Value   Admission Height 167.6 cm (66\") Documented at 05/08/2024 1551   Admission Weight 68 kg (150 lb) Documented at 05/08/2024 1551                 Physical Exam:   Vitals and nursing note reviewed.   Constitutional:       Appearance: Frail. Chronically ill-appearing.   Neck:      Vascular: No JVR. JVD normal.   Pulmonary:      Effort: Pulmonary effort is normal.      Breath sounds: Normal breath sounds. No wheezing. No rhonchi. No rales.   Chest:      Chest wall: Not tender to palpatation.   Cardiovascular:      PMI at left midclavicular line. Normal rate. Regular rhythm. Normal S1. Normal S2.       Murmurs: There is no murmur.      No gallop.  No click. No rub.   Pulses:     Intact distal pulses.   Edema:     Peripheral edema absent.   Abdominal:      General: Bowel sounds are normal.      Palpations: Abdomen is soft.      Tenderness: There is no abdominal tenderness.   Musculoskeletal: Normal range of motion.         General: No tenderness. Skin:     General: Skin is warm and dry.   Neurological:      General: No focal deficit present.      Mental Status: Alert and oriented to person, place and time.         Results Review:   I reviewed the patient's new clinical results.  Results from last 7 days   Lab Units 05/09/24  0556 05/08/24  1646   WBC 10*3/mm3 13.64* 10.96*   HEMOGLOBIN g/dL 12.2 12.6   HEMATOCRIT % 36.1 37.1   PLATELETS 10*3/mm3 176 183     Results from last 7 days   Lab Units 05/09/24  0556 05/08/24  1646   SODIUM mmol/L 128* 128*   POTASSIUM mmol/L 4.0 4.1   CHLORIDE mmol/L 90* 90*   CO2 mmol/L 24.8 27.2   BUN mg/dL 8 8   CREATININE mg/dL 0.79 0.84   GLUCOSE mg/dL 129* 109*   CALCIUM mg/dL 9.4 9.0     No results found for: \"TROPONINT\"          Assessment/Plan:      Femoral neck fracture    Anxiety    Dyslipidemia    Pill rolling " tremors    Depression    Essential hypertension    Gastroesophageal reflux disease with esophagitis without hemorrhage    CAD (coronary artery disease)      The patient is considered acceptable risk of a cardiovascular complication from planned orthopedic surgery.    I discussed the patient's findings and my recommendations with patient, family, and consulting provider    Edmar Thomas MD  05/09/24  10:19 EDT

## 2024-05-09 NOTE — PLAN OF CARE
Problem: Adult Inpatient Plan of Care  Goal: Plan of Care Review  Outcome: Ongoing, Progressing  Flowsheets (Taken 5/9/2024 0550)  Progress: no change  Plan of Care Reviewed With: patient  Outcome Evaluation: Patient had complaints of pain noted overnight, see MAR. Patient had some complaints of nausea and vomiting overnight, see MAR. NPO for possible sx today, plan of care continued.  Goal: Patient-Specific Goal (Individualized)  Outcome: Ongoing, Progressing  Goal: Absence of Hospital-Acquired Illness or Injury  Outcome: Ongoing, Progressing  Intervention: Identify and Manage Fall Risk  Recent Flowsheet Documentation  Taken 5/9/2024 0200 by Lula Washington RN  Safety Promotion/Fall Prevention:   safety round/check completed   activity supervised   assistive device/personal items within reach   clutter free environment maintained   fall prevention program maintained   nonskid shoes/slippers when out of bed  Taken 5/9/2024 0100 by Lula Washington RN  Safety Promotion/Fall Prevention:   safety round/check completed   activity supervised   assistive device/personal items within reach   clutter free environment maintained   fall prevention program maintained   nonskid shoes/slippers when out of bed  Taken 5/9/2024 0020 by Lula Washington RN  Safety Promotion/Fall Prevention:   activity supervised   assistive device/personal items within reach   clutter free environment maintained   fall prevention program maintained   nonskid shoes/slippers when out of bed   room organization consistent   safety round/check completed  Intervention: Prevent Skin Injury  Recent Flowsheet Documentation  Taken 5/9/2024 0200 by Lula Washington RN  Body Position: patient/family refused  Taken 5/9/2024 0100 by Lula Washington RN  Body Position: position changed independently  Taken 5/9/2024 0020 by Lula Washington RN  Body Position: supine  Skin Protection:   adhesive use limited   incontinence pads utilized    tubing/devices free from skin contact  Intervention: Prevent and Manage VTE (Venous Thromboembolism) Risk  Recent Flowsheet Documentation  Taken 5/9/2024 0200 by Lula Washington RN  Activity Management: bedrest  Taken 5/9/2024 0100 by Lula Washington RN  Activity Management: bedrest  Taken 5/9/2024 0020 by Lula Washington RN  Activity Management: bedrest  VTE Prevention/Management:   bilateral   sequential compression devices off  Intervention: Prevent Infection  Recent Flowsheet Documentation  Taken 5/9/2024 0200 by Lula Washington RN  Infection Prevention:   environmental surveillance performed   hand hygiene promoted   rest/sleep promoted  Taken 5/9/2024 0100 by Lula Washington RN  Infection Prevention:   environmental surveillance performed   hand hygiene promoted   rest/sleep promoted  Taken 5/9/2024 0020 by Lula Washington RN  Infection Prevention:   environmental surveillance performed   rest/sleep promoted   single patient room provided  Goal: Optimal Comfort and Wellbeing  Outcome: Ongoing, Progressing  Intervention: Monitor Pain and Promote Comfort  Recent Flowsheet Documentation  Taken 5/9/2024 0153 by Lula Washington RN  Pain Management Interventions: see MAR  Intervention: Provide Person-Centered Care  Recent Flowsheet Documentation  Taken 5/9/2024 0020 by Lula Washington RN  Trust Relationship/Rapport:   care explained   choices provided   reassurance provided   thoughts/feelings acknowledged  Goal: Readiness for Transition of Care  Outcome: Ongoing, Progressing   Goal Outcome Evaluation:  Plan of Care Reviewed With: patient        Progress: no change  Outcome Evaluation: Patient had complaints of pain noted overnight, see MAR. Patient had some complaints of nausea and vomiting overnight, see MAR. NPO for possible sx today, plan of care continued.

## 2024-05-09 NOTE — PLAN OF CARE
Goal Outcome Evaluation:      PRN meds given for pain. NPO at midnight for hip repair tomorrow.

## 2024-05-09 NOTE — CONSULTS
Referring Provider: Hospitalist service  Reason for Consultation: Left femoral neck fracture    Patient Care Team:  Rod Vance MD as PCP - General (Internal Medicine)  Gael Baker MD as Consulting Physician (Cardiology)        History of present illness:    69-year-old female is admitted following a mechanical fall at home.  She could not bear weight at the left lower extremity or get out of the floor following her fall secondary to left hip pain and deformity.  She was transported via EMS to the hospital.  She was seen at [facility].  She was found to have a displaced femoral neck fracture, the left lower extremity.  She was admitted to the hospital and orthopedic consultation was requested.  She notes no other injury sustained the time of the fall.  She denies chest pain, nausea, vomiting and abdominal pain.  She did just have a cardiac cath with stent placement.  She was just evaluated earlier this morning by cardiology and cleared for surgery.  Orthopedic surgery consultation has been requested.  Current pain level is 8/10.    Review of Systems  Pertinent items are noted in HPI, all other systems reviewed and negative    History  Past Medical History:   Diagnosis Date    Anxiety     Arthritis     CAD (coronary artery disease)     stent 2007    Chronic kidney disease     SEES DR. MIRANDA    Constipation     Depression     Diabetes mellitus     Diarrhea     Dysphagia     SPOUSE REPORTS VERY MILD.    Elevated cholesterol     Gastric ulcer     GERD (gastroesophageal reflux disease)     Heart disease     History of chest pain     SPOUSE REPORTS APPROXIMATLEY FEW MONTHS AGO AND THAT PATIENT HAD WORK UP WITH DR MAGANA.  STATES HAD NUCLEAR STRESS AND IT WAS WNL.      Hyperlipidemia     Hypertension     Hypokalemia     Kidney stone     Low sodium levels 2017    Psychosis     PVD (peripheral vascular disease)     Schizophrenia     SPOUSE REPORTED    Seizures 01/2016    ONLY  HAD ONE EPISODE.  STATED SIDE EFFECTS OF MEDICATION    Sleep apnea     SPOUSE REPORTS PATIENT WAS NOT ABLE TO TOLERATE THE CPAP.      Thyroid condition     TIA (transient ischemic attack)     .  SPOUSE REPORTS NO RESIDUAL PROBLEMS.    Tremors of nervous system     Poss. r/t medications pt is on.    Uterine fibroid     Vitamin B12 deficiency    ,   Past Surgical History:   Procedure Laterality Date    CARDIAC CATHETERIZATION      SPOUSE REPORTS  AND  (REPORTS A TOTAL OF 4 STENTS WERE PLACED)    CARDIAC SURGERY      SPOUSE REPORTS 2 STENTS PLACED IN  AND 2 STENTS IN     COLONOSCOPY N/A 2018    Procedure: COLONOSCOPY with cold biopsy polypectomies and biopsies;  Surgeon: Servando Rooney MD;  Location: [facility] ENDOSCOPY;  Service: Gastroenterology    CYST REMOVAL      SPOUSE REPORTS REMOVED FROM NECK    ENDOSCOPY N/A 2017    Procedure: ESOPHAGOGASTRODUODENOSCOPY WITH BIOPSIES;  Surgeon: Servando Rooney MD;  Location: [facility] ENDOSCOPY;  Service:     ENDOSCOPY N/A 2018    Procedure: ESOPHAGOGASTRODUODENOSCOPY with biopsies;  Surgeon: Servando Rooney MD;  Location: [facility] ENDOSCOPY;  Service:     ENDOSCOPY      ENDOSCOPY N/A 2018    Procedure: ESOPHAGOGASTRODUODENOSCOPY WITH BIOPSIES;  Surgeon: Servando Rooney MD;  Location: [facility] ENDOSCOPY;  Service: Gastroenterology    OTHER SURGICAL HISTORY      SPOUSE REPORTS PATIENT ATTEMPTED COLONOSCOPY BEFORE BUT PREP WAS NOT COMPLETE    TUBAL ABDOMINAL LIGATION     ,   Family History   Problem Relation Age of Onset    Heart disease Mother     Hypertension Mother     Alcohol abuse Father     Colon cancer Neg Hx    ,   Social History     Socioeconomic History    Marital status:    Tobacco Use    Smoking status: Former     Current packs/day: 0.00     Average packs/day: 0.3 packs/day for 5.0 years (1.3 ttl pk-yrs)     Types: Cigarettes     Start date: 1987     Quit date: 1992     Years since quittin.6    Smokeless tobacco: Never  "   Tobacco comments:     SPOUSE REPORTS \"SHE DIDN'T INHALE\"   Vaping Use    Vaping status: Never Used   Substance and Sexual Activity    Alcohol use: No    Drug use: No    Sexual activity: Defer     E-cigarette/Vaping    E-cigarette/Vaping Use Never User      E-cigarette/Vaping Substances     E-cigarette/Vaping Devices         , Scheduled Meds:  aspirin, 81 mg, Oral, Daily  atorvastatin, 80 mg, Oral, Daily  clopidogrel, 75 mg, Oral, Daily  levothyroxine, 25 mcg, Oral, Daily  lisinopril, 40 mg, Oral, Q24H  metoprolol tartrate, 50 mg, Oral, Q12H  pantoprazole, 40 mg, Oral, Daily   , and Allergies:  Penicillins    Objective     Vital Signs   Temp:  [97.9 °F (36.6 °C)-98.3 °F (36.8 °C)] 98.3 °F (36.8 °C)  Heart Rate:  [68-89] 70  Resp:  [8-22] 18  BP: (134-191)/() 175/90    Physical Exam:     General: Patient is alert and oriented x 3.  No acute distress.  HEENT: Head is atraumatic, normocephalic.  Pupils are equal reactive to light and accommodating.  Neck is supple with no JVD.  Cranial nerve testing intact 2 through 12.   Cardiovascular: Intact distal pulses in all 4 extremities With mild pretibial edema bilateral lower extremities.  Respiratory: Breaths are easy and unlabored.  No chest wall pain to palpation.  Abdomen: Soft, nontender, nondistended.  No guarding or rebound.  Neurologic: Intact sensation in all 4 extremities with intact DTRs.  Skin: Intact with no acute lesions noted.  Left lower extremity: There is shortening and external rotation of the left lower extremity.  There is pain with any range of motion of the left hip.  Mild swelling of the left thigh noted.  No signs of DVT.  No open wounds.      Results Review:   I reviewed the patient's new clinical results.  I reviewed the patient's new imaging results and agree with the interpretation.      Assessment & Plan       Femoral neck fracture    Anxiety    Dyslipidemia    Pill rolling tremors    Depression    Essential hypertension    " Gastroesophageal reflux disease with esophagitis without hemorrhage    CAD (coronary artery disease)    Recommendation is for left hip hemiarthroplasty versus total hip arthroplasty.  Discussed indications for surgery, risks, benefits, details of the procedure and expectations.  Discussed all potential complications including the risk for cardiac complications and death.  Surgery will be performed by Dr. Isac Hong when he is available.  All questions were answered.    I discussed the patients findings and my recommendations with patient, nursing staff, and consulting provider    Jeremi Ceja PA-C  05/09/24  12:37 EDT

## 2024-05-09 NOTE — PROGRESS NOTES
[facility]  INTERNAL MEDICINE PROGRESS NOTE    Name:  Trang Maynard   Age:  69 y.o.  Sex:  female  :  1955  MRN:  [id number]   Visit Number:  [id number]  Admission Date:  2024  Date Of Service:  24  Primary Care Physician:  Rod Vance MD     LOS: 1 day :  Patient Care Team:  Rod Vance MD as PCP - General (Internal Medicine)  Gael Baker MD as Consulting Physician (Cardiology):      Subjective / Interval History:       69-year-old patient with past medical history of coronary artery disease with recent cath, hypertension, depression, GERD, tremors, anxiety disorder, who comes into the ER s/p fall  She fell accidentally could not get up and EMS was called.  She was found to have left femoral fracture and Dr. Hong was consulted who recommended admission for surgery and being the primary I was contacted for her to be admitted.     At present upon evaluation she denies any chest pain nausea vomiting still is having severe pain unable to move her leg.  She is very nervous anxious and scared.  She also has been having tremors which has been from before.  She states that she did have a heart cath and they found distal ostial blockage which could not be stented and has been on medical management.      Patient has been seen today on May 9.  Case discussed with Dr. Thomas who states that he did speak with the cardiologist who did her cath and she is stable to undergo surgery..  Patient is pending to be seen by the orthopedic doctor for further surgery.  She is having significant pain and says that has not yet used the morphine but did take hydrocodone which did not    Vital Signs:    Temp:  [98.2 °F (36.8 °C)-99.5 °F (37.5 °C)] 99.5 °F (37.5 °C)  Heart Rate:  [68-86] 84  Resp:  [8-22] 18  BP: (134-191)/() 155/91    Intake and output:    No intake/output data recorded.  No intake/output data recorded.    Physical Examination:    General Appearance:    Alert  and cooperative, not in any acute distress.   Head:    Atraumatic and normocephalic, without obvious abnormality.   Eyes:            PERRLA,  No pallor. Extraocular movements are within normal limits.   Neck:   Supple,  No lymph glands, no bruit   Lungs:     Chest shape is normal. Breath sounds heard bilaterally equally.  No crackles or wheezing.     Heart:    Normal S1 and S2, no murmur,  No JVD   Abdomen:     Normal bowel sounds, no masses, no organomegaly. Soft     nontender, no guarding, no rebound tenderness   Extremities:   Moves right lower extremity well but left she is unable to move due to fracture no edema, no cyanosis,    Skin:   No  bruising or rash.   Neurologic:   Grossly nonfocal and cannot move the left leg due to fracture     Laboratory results:  Results from last 7 days   Lab Units 05/09/24  0556 05/08/24  1646   SODIUM mmol/L 128* 128*   POTASSIUM mmol/L 4.0 4.1   CHLORIDE mmol/L 90* 90*   CO2 mmol/L 24.8 27.2   BUN mg/dL 8 8   CREATININE mg/dL 0.79 0.84   CALCIUM mg/dL 9.4 9.0   BILIRUBIN mg/dL 0.9 0.6   ALK PHOS U/L 84 101   ALT (SGPT) U/L 15 17   AST (SGOT) U/L 23 22   GLUCOSE mg/dL 129* 109*     Results from last 7 days   Lab Units 05/09/24  0556 05/08/24  1646   WBC 10*3/mm3 13.64* 10.96*   HEMOGLOBIN g/dL 12.2 12.6   HEMATOCRIT % 36.1 37.1   PLATELETS 10*3/mm3 176 183     Results from last 7 days   Lab Units 05/08/24  1646   INR  1.02               Radiology results:    Imaging Results (Last 24 Hours)       ** No results found for the last 24 hours. **            I have reviewed the patient's radiology reports.    Medication Review:     I have reviewed the patients active and prn medications.     Assessment:      Femoral neck fracture    Anxiety    Dyslipidemia    Pill rolling tremors    Depression    Essential hypertension    Gastroesophageal reflux disease with esophagitis without hemorrhage    CAD (coronary artery disease)          Plan:    1.  Left-sided femoral fracture-she is  medically stable and cleared to undergo surgery and pending orthopedic evaluation for surgery  Optimize pain control with morphine    2.  Coronary artery disease-had a lengthy discussion with Dr. Thomas and he has evaluated and states that the medically she is stable to undergo surgery and further management will be done as per changes.  Medical management will be continued as per orders    Continue rest of the orders for all other medical problems and goals of treatment and plan of care has been addressed with the patient in detail and the nursing staff    Rod Vance MD  05/09/24  17:13 EDT      Please note that portions of this note were completed with a voice recognition program.

## 2024-05-10 ENCOUNTER — ANESTHESIA EVENT (OUTPATIENT)
Dept: PERIOP | Facility: HOSPITAL | Age: 69
End: 2024-05-10
Payer: MEDICARE

## 2024-05-10 ENCOUNTER — ANESTHESIA EVENT CONVERTED (OUTPATIENT)
Dept: ANESTHESIOLOGY | Facility: HOSPITAL | Age: 69
DRG: 522 | End: 2024-05-10
Payer: MEDICARE

## 2024-05-10 ENCOUNTER — ANESTHESIA (OUTPATIENT)
Dept: PERIOP | Facility: HOSPITAL | Age: 69
End: 2024-05-10
Payer: MEDICARE

## 2024-05-10 PROBLEM — S72.009A FEMORAL NECK FRACTURE: Status: RESOLVED | Noted: 2024-05-08 | Resolved: 2024-05-10

## 2024-05-10 LAB
ALBUMIN SERPL-MCNC: 3.8 G/DL (ref 3.5–5.2)
ALBUMIN SERPL-MCNC: 3.8 G/DL (ref 3.5–5.2)
ALBUMIN/GLOB SERPL: 1.3 G/DL
ALBUMIN/GLOB SERPL: 1.4 G/DL
ALP SERPL-CCNC: 81 U/L (ref 39–117)
ALP SERPL-CCNC: 82 U/L (ref 39–117)
ALT SERPL W P-5'-P-CCNC: 15 U/L (ref 1–33)
ALT SERPL W P-5'-P-CCNC: 15 U/L (ref 1–33)
ANION GAP SERPL CALCULATED.3IONS-SCNC: 11.3 MMOL/L (ref 5–15)
ANION GAP SERPL CALCULATED.3IONS-SCNC: 9.9 MMOL/L (ref 5–15)
AST SERPL-CCNC: 23 U/L (ref 1–32)
AST SERPL-CCNC: 24 U/L (ref 1–32)
BILIRUB SERPL-MCNC: 0.6 MG/DL (ref 0–1.2)
BILIRUB SERPL-MCNC: 0.7 MG/DL (ref 0–1.2)
BUN SERPL-MCNC: 11 MG/DL (ref 8–23)
BUN SERPL-MCNC: 12 MG/DL (ref 8–23)
BUN/CREAT SERPL: 10.5 (ref 7–25)
BUN/CREAT SERPL: 11.9 (ref 7–25)
CALCIUM SPEC-SCNC: 9.5 MG/DL (ref 8.6–10.5)
CALCIUM SPEC-SCNC: 9.6 MG/DL (ref 8.6–10.5)
CHLORIDE SERPL-SCNC: 92 MMOL/L (ref 98–107)
CHLORIDE SERPL-SCNC: 94 MMOL/L (ref 98–107)
CO2 SERPL-SCNC: 24.7 MMOL/L (ref 22–29)
CO2 SERPL-SCNC: 26.1 MMOL/L (ref 22–29)
CREAT SERPL-MCNC: 1.01 MG/DL (ref 0.57–1)
CREAT SERPL-MCNC: 1.05 MG/DL (ref 0.57–1)
DEPRECATED RDW RBC AUTO: 48.9 FL (ref 37–54)
DEPRECATED RDW RBC AUTO: 49.1 FL (ref 37–54)
EGFRCR SERPLBLD CKD-EPI 2021: 56.9 ML/MIN/1.73
EGFRCR SERPLBLD CKD-EPI 2021: 59.6 ML/MIN/1.73
ERYTHROCYTE [DISTWIDTH] IN BLOOD BY AUTOMATED COUNT: 14.6 % (ref 12.3–15.4)
ERYTHROCYTE [DISTWIDTH] IN BLOOD BY AUTOMATED COUNT: 14.6 % (ref 12.3–15.4)
GLOBULIN UR ELPH-MCNC: 2.8 GM/DL
GLOBULIN UR ELPH-MCNC: 2.9 GM/DL
GLUCOSE SERPL-MCNC: 111 MG/DL (ref 65–99)
GLUCOSE SERPL-MCNC: 117 MG/DL (ref 65–99)
HCT VFR BLD AUTO: 37.3 % (ref 34–46.6)
HCT VFR BLD AUTO: 37.5 % (ref 34–46.6)
HGB BLD-MCNC: 12.3 G/DL (ref 12–15.9)
HGB BLD-MCNC: 12.4 G/DL (ref 12–15.9)
MCH RBC QN AUTO: 29.8 PG (ref 26.6–33)
MCH RBC QN AUTO: 30 PG (ref 26.6–33)
MCHC RBC AUTO-ENTMCNC: 33 G/DL (ref 31.5–35.7)
MCHC RBC AUTO-ENTMCNC: 33.1 G/DL (ref 31.5–35.7)
MCV RBC AUTO: 90.3 FL (ref 79–97)
MCV RBC AUTO: 90.6 FL (ref 79–97)
PLATELET # BLD AUTO: 144 10*3/MM3 (ref 140–450)
PLATELET # BLD AUTO: 145 10*3/MM3 (ref 140–450)
PMV BLD AUTO: 10.1 FL (ref 6–12)
PMV BLD AUTO: 9.7 FL (ref 6–12)
POTASSIUM SERPL-SCNC: 4.3 MMOL/L (ref 3.5–5.2)
POTASSIUM SERPL-SCNC: 4.8 MMOL/L (ref 3.5–5.2)
PROT SERPL-MCNC: 6.6 G/DL (ref 6–8.5)
PROT SERPL-MCNC: 6.7 G/DL (ref 6–8.5)
RBC # BLD AUTO: 4.13 10*6/MM3 (ref 3.77–5.28)
RBC # BLD AUTO: 4.14 10*6/MM3 (ref 3.77–5.28)
SODIUM SERPL-SCNC: 128 MMOL/L (ref 136–145)
SODIUM SERPL-SCNC: 130 MMOL/L (ref 136–145)
WBC NRBC COR # BLD AUTO: 11.14 10*3/MM3 (ref 3.4–10.8)
WBC NRBC COR # BLD AUTO: 12.29 10*3/MM3 (ref 3.4–10.8)

## 2024-05-10 PROCEDURE — 25010000002 ONDANSETRON PER 1 MG: Performed by: STUDENT IN AN ORGANIZED HEALTH CARE EDUCATION/TRAINING PROGRAM

## 2024-05-10 PROCEDURE — C1776 JOINT DEVICE (IMPLANTABLE): HCPCS | Performed by: ORTHOPAEDIC SURGERY

## 2024-05-10 PROCEDURE — 25810000003 LACTATED RINGERS PER 1000 ML: Performed by: ORTHOPAEDIC SURGERY

## 2024-05-10 PROCEDURE — 25010000002 FENTANYL CITRATE (PF) 50 MCG/ML SOLUTION PREFILLED SYRINGE: Performed by: NURSE ANESTHETIST, CERTIFIED REGISTERED

## 2024-05-10 PROCEDURE — 25010000002 PROPOFOL 10 MG/ML EMULSION: Performed by: NURSE ANESTHETIST, CERTIFIED REGISTERED

## 2024-05-10 PROCEDURE — 85027 COMPLETE CBC AUTOMATED: CPT | Performed by: INTERNAL MEDICINE

## 2024-05-10 PROCEDURE — 25010000002 MORPHINE PER 10 MG: Performed by: INTERNAL MEDICINE

## 2024-05-10 PROCEDURE — 0SRS0JZ REPLACEMENT OF LEFT HIP JOINT, FEMORAL SURFACE WITH SYNTHETIC SUBSTITUTE, OPEN APPROACH: ICD-10-PCS | Performed by: ORTHOPAEDIC SURGERY

## 2024-05-10 PROCEDURE — 80053 COMPREHEN METABOLIC PANEL: CPT | Performed by: INTERNAL MEDICINE

## 2024-05-10 PROCEDURE — 25010000002 MIDAZOLAM PER 1MG: Performed by: NURSE ANESTHETIST, CERTIFIED REGISTERED

## 2024-05-10 PROCEDURE — 25010000002 CLINDAMYCIN 900 MG/50ML SOLUTION: Performed by: ORTHOPAEDIC SURGERY

## 2024-05-10 PROCEDURE — 25010000002 ROPIVACAINE PER 1 MG: Performed by: NURSE ANESTHETIST, CERTIFIED REGISTERED

## 2024-05-10 PROCEDURE — 25010000002 DEXAMETHASONE PER 1 MG: Performed by: NURSE ANESTHETIST, CERTIFIED REGISTERED

## 2024-05-10 PROCEDURE — 25810000003 SODIUM CHLORIDE 0.9 % SOLUTION: Performed by: ORTHOPAEDIC SURGERY

## 2024-05-10 PROCEDURE — 25010000002 VANCOMYCIN 1 G RECONSTITUTED SOLUTION: Performed by: ORTHOPAEDIC SURGERY

## 2024-05-10 DEVICE — STEM HIP ACCOLADE2 V40 132D 35X108MM SZ5: Type: IMPLANTABLE DEVICE | Site: HIP | Status: FUNCTIONAL

## 2024-05-10 DEVICE — HD FEM/HIP BIOLOX/DELTA V40 CERAM 28MM: Type: IMPLANTABLE DEVICE | Site: HIP | Status: FUNCTIONAL

## 2024-05-10 DEVICE — DEV CONTRL TISS STRATAFIX SPIRAL MNCRYL UD 3/0 PLS 60CM: Type: IMPLANTABLE DEVICE | Site: HIP | Status: FUNCTIONAL

## 2024-05-10 DEVICE — IMPLANTABLE DEVICE: Type: IMPLANTABLE DEVICE | Status: FUNCTIONAL

## 2024-05-10 DEVICE — DEV CONTRL TISS STRATAFIX SYMM PDS PLUS VIL CT-1 45CM: Type: IMPLANTABLE DEVICE | Site: HIP | Status: FUNCTIONAL

## 2024-05-10 DEVICE — HD UHR BIPOL 28X46MM: Type: IMPLANTABLE DEVICE | Site: HIP | Status: FUNCTIONAL

## 2024-05-10 RX ORDER — CLINDAMYCIN PHOSPHATE 900 MG/50ML
900 INJECTION, SOLUTION INTRAVENOUS ONCE
Status: COMPLETED | OUTPATIENT
Start: 2024-05-10 | End: 2024-05-10

## 2024-05-10 RX ORDER — ENOXAPARIN SODIUM 100 MG/ML
40 INJECTION SUBCUTANEOUS DAILY
Status: DISCONTINUED | OUTPATIENT
Start: 2024-05-11 | End: 2024-05-12 | Stop reason: HOSPADM

## 2024-05-10 RX ORDER — SODIUM CHLORIDE, SODIUM LACTATE, POTASSIUM CHLORIDE, CALCIUM CHLORIDE 600; 310; 30; 20 MG/100ML; MG/100ML; MG/100ML; MG/100ML
1000 INJECTION, SOLUTION INTRAVENOUS CONTINUOUS
Status: ACTIVE | OUTPATIENT
Start: 2024-05-10 | End: 2024-05-12

## 2024-05-10 RX ORDER — EPHEDRINE SULFATE 5 MG/ML
INJECTION INTRAVENOUS AS NEEDED
Status: DISCONTINUED | OUTPATIENT
Start: 2024-05-10 | End: 2024-05-10 | Stop reason: SURG

## 2024-05-10 RX ORDER — PROCHLORPERAZINE EDISYLATE 5 MG/ML
10 INJECTION INTRAMUSCULAR; INTRAVENOUS ONCE AS NEEDED
Status: DISCONTINUED | OUTPATIENT
Start: 2024-05-10 | End: 2024-05-10 | Stop reason: HOSPADM

## 2024-05-10 RX ORDER — MIDAZOLAM HYDROCHLORIDE 2 MG/2ML
INJECTION, SOLUTION INTRAMUSCULAR; INTRAVENOUS AS NEEDED
Status: DISCONTINUED | OUTPATIENT
Start: 2024-05-10 | End: 2024-05-10 | Stop reason: SURG

## 2024-05-10 RX ORDER — PROPOFOL 10 MG/ML
VIAL (ML) INTRAVENOUS AS NEEDED
Status: DISCONTINUED | OUTPATIENT
Start: 2024-05-10 | End: 2024-05-10 | Stop reason: SURG

## 2024-05-10 RX ORDER — MAGNESIUM HYDROXIDE 1200 MG/15ML
LIQUID ORAL AS NEEDED
Status: DISCONTINUED | OUTPATIENT
Start: 2024-05-10 | End: 2024-05-10 | Stop reason: HOSPADM

## 2024-05-10 RX ORDER — LORAZEPAM 2 MG/ML
1 INJECTION INTRAMUSCULAR
Status: DISCONTINUED | OUTPATIENT
Start: 2024-05-10 | End: 2024-05-10 | Stop reason: HOSPADM

## 2024-05-10 RX ORDER — MEPERIDINE HYDROCHLORIDE 25 MG/ML
25 INJECTION INTRAMUSCULAR; INTRAVENOUS; SUBCUTANEOUS
Status: DISCONTINUED | OUTPATIENT
Start: 2024-05-10 | End: 2024-05-10 | Stop reason: HOSPADM

## 2024-05-10 RX ORDER — KETAMINE HCL IN NACL, ISO-OSM 100MG/10ML
SYRINGE (ML) INJECTION AS NEEDED
Status: DISCONTINUED | OUTPATIENT
Start: 2024-05-10 | End: 2024-05-10 | Stop reason: SURG

## 2024-05-10 RX ORDER — ROPIVACAINE HYDROCHLORIDE 5 MG/ML
INJECTION, SOLUTION EPIDURAL; INFILTRATION; PERINEURAL
Status: COMPLETED | OUTPATIENT
Start: 2024-05-10 | End: 2024-05-10

## 2024-05-10 RX ORDER — SODIUM CHLORIDE 9 MG/ML
100 INJECTION, SOLUTION INTRAVENOUS CONTINUOUS
Status: DISCONTINUED | OUTPATIENT
Start: 2024-05-10 | End: 2024-05-12 | Stop reason: HOSPADM

## 2024-05-10 RX ORDER — LIDOCAINE HCL/PF 100 MG/5ML
SYRINGE (ML) INJECTION AS NEEDED
Status: DISCONTINUED | OUTPATIENT
Start: 2024-05-10 | End: 2024-05-10 | Stop reason: SURG

## 2024-05-10 RX ORDER — PHENYLEPHRINE HCL IN 0.9% NACL 1 MG/10 ML
SYRINGE (ML) INTRAVENOUS AS NEEDED
Status: DISCONTINUED | OUTPATIENT
Start: 2024-05-10 | End: 2024-05-10 | Stop reason: SURG

## 2024-05-10 RX ORDER — FENTANYL CITRATE 50 UG/ML
INJECTION, SOLUTION INTRAMUSCULAR; INTRAVENOUS AS NEEDED
Status: DISCONTINUED | OUTPATIENT
Start: 2024-05-10 | End: 2024-05-10 | Stop reason: SURG

## 2024-05-10 RX ORDER — DIPHENHYDRAMINE HYDROCHLORIDE 50 MG/ML
12.5 INJECTION INTRAMUSCULAR; INTRAVENOUS
Status: DISCONTINUED | OUTPATIENT
Start: 2024-05-10 | End: 2024-05-10 | Stop reason: HOSPADM

## 2024-05-10 RX ORDER — DEXAMETHASONE SODIUM PHOSPHATE 4 MG/ML
INJECTION, SOLUTION INTRA-ARTICULAR; INTRALESIONAL; INTRAMUSCULAR; INTRAVENOUS; SOFT TISSUE AS NEEDED
Status: DISCONTINUED | OUTPATIENT
Start: 2024-05-10 | End: 2024-05-10 | Stop reason: SURG

## 2024-05-10 RX ADMIN — MORPHINE SULFATE 2 MG: 2 INJECTION, SOLUTION INTRAMUSCULAR; INTRAVENOUS at 09:03

## 2024-05-10 RX ADMIN — MIDAZOLAM HYDROCHLORIDE 2 MG: 1 INJECTION, SOLUTION INTRAMUSCULAR; INTRAVENOUS at 15:55

## 2024-05-10 RX ADMIN — METOPROLOL TARTRATE 50 MG: 25 TABLET, FILM COATED ORAL at 20:59

## 2024-05-10 RX ADMIN — ROPIVACAINE HYDROCHLORIDE 20 ML: 5 INJECTION, SOLUTION EPIDURAL; INFILTRATION; PERINEURAL at 15:50

## 2024-05-10 RX ADMIN — SODIUM CHLORIDE 100 ML/HR: 9 INJECTION, SOLUTION INTRAVENOUS at 21:02

## 2024-05-10 RX ADMIN — EPHEDRINE SULFATE 5 MG: 5 INJECTION INTRAVENOUS at 16:34

## 2024-05-10 RX ADMIN — FENTANYL CITRATE 50 MCG: 50 INJECTION, SOLUTION INTRAMUSCULAR; INTRAVENOUS at 15:55

## 2024-05-10 RX ADMIN — ASPIRIN 81 MG: 81 TABLET, COATED ORAL at 09:03

## 2024-05-10 RX ADMIN — DEXAMETHASONE SODIUM PHOSPHATE 4 MG: 4 INJECTION, SOLUTION INTRA-ARTICULAR; INTRALESIONAL; INTRAMUSCULAR; INTRAVENOUS; SOFT TISSUE at 16:20

## 2024-05-10 RX ADMIN — CLINDAMYCIN IN 5 PERCENT DEXTROSE 900 MG: 18 INJECTION, SOLUTION INTRAVENOUS at 16:05

## 2024-05-10 RX ADMIN — ATORVASTATIN CALCIUM 80 MG: 80 TABLET, FILM COATED ORAL at 09:02

## 2024-05-10 RX ADMIN — SODIUM CHLORIDE 1000 MG: 900 INJECTION, SOLUTION INTRAVENOUS at 21:52

## 2024-05-10 RX ADMIN — MORPHINE SULFATE 2 MG: 2 INJECTION, SOLUTION INTRAMUSCULAR; INTRAVENOUS at 12:04

## 2024-05-10 RX ADMIN — Medication 25 MG: at 15:55

## 2024-05-10 RX ADMIN — Medication 200 MCG: at 16:33

## 2024-05-10 RX ADMIN — Medication 50 MG: at 16:20

## 2024-05-10 RX ADMIN — CLOPIDOGREL BISULFATE 75 MG: 75 TABLET ORAL at 09:03

## 2024-05-10 RX ADMIN — Medication 25 MG: at 16:15

## 2024-05-10 RX ADMIN — PROPOFOL 150 MG: 10 INJECTION, EMULSION INTRAVENOUS at 16:20

## 2024-05-10 RX ADMIN — METOPROLOL TARTRATE 50 MG: 25 TABLET, FILM COATED ORAL at 09:02

## 2024-05-10 RX ADMIN — ONDANSETRON 4 MG: 2 INJECTION INTRAMUSCULAR; INTRAVENOUS at 02:38

## 2024-05-10 RX ADMIN — ONDANSETRON 4 MG: 2 INJECTION INTRAMUSCULAR; INTRAVENOUS at 16:20

## 2024-05-10 RX ADMIN — MORPHINE SULFATE 2 MG: 2 INJECTION, SOLUTION INTRAMUSCULAR; INTRAVENOUS at 02:27

## 2024-05-10 RX ADMIN — Medication 10 ML: at 09:03

## 2024-05-10 RX ADMIN — SODIUM CHLORIDE, POTASSIUM CHLORIDE, SODIUM LACTATE AND CALCIUM CHLORIDE 1000 ML: 600; 310; 30; 20 INJECTION, SOLUTION INTRAVENOUS at 14:37

## 2024-05-10 NOTE — CASE MANAGEMENT/SOCIAL WORK
Case Management/Social Work    Patient Name:  Trang Maynard  YOB: 1955  MRN: [id number]  Admit Date:  5/8/2024        11:03 EDT  Met with patient at bedside. Plan to DC home with spouse once medically ready. Patient to have surgery later today for hip repair. CM will continue to follow.      Electronically signed by:  Jim Dimas RN  05/10/24 11:03 EDT

## 2024-05-10 NOTE — PLAN OF CARE
Goal Outcome Evaluation:  Plan of Care Reviewed With: patient        Progress: no change  Outcome Evaluation: Pt is in procedure for Hip Hemiarthroplasty (1500).  at bedside. VSS, ox4, RA, bedrest. Pt c\o pain, PRN pain meds given as prescribed and spoke with doctor about her pain meds. Physician verbally stated to give pt's pain meds at due times when they came in window to keep her comfortable. Pt denied sob. Pt tolerated IV fluids. Pt has HTN, provider notified. Provider stated to give all regular scheduled morning meds except lisinopril. Pt was given meds as stated by Dr. Vance. Plan of care ongoing.

## 2024-05-10 NOTE — PROGRESS NOTES
[facility]  INTERNAL MEDICINE PROGRESS NOTE    Name:  Trang Maynard   Age:  69 y.o.  Sex:  female  :  1955  MRN:  [id number]   Visit Number:  [id number]  Admission Date:  2024  Date Of Service:  05/10/24  Primary Care Physician:  Rod Vance MD     LOS: 2 days :  Patient Care Team:  Rod Vance MD as PCP - General (Internal Medicine)  Gael Baker MD as Consulting Physician (Cardiology):      Subjective / Interval History:       69-year-old patient with past medical history of coronary artery disease with recent cath, hypertension, depression, GERD, tremors, anxiety disorder, who comes into the ER s/p fall  She fell accidentally could not get up and EMS was called.  She was found to have left femoral fracture and Dr. Hong was consulted who recommended admission for surgery and being the primary I was contacted for her to be admitted.     At present upon evaluation she denies any chest pain nausea vomiting still is having severe pain unable to move her leg.  She is very nervous anxious and scared.  She also has been having tremors which has been from before.  She states that she did have a heart cath and they found distal ostial blockage which could not be stented and has been on medical management.      Patient has been seen today on May 9.  Case discussed with Dr. Thomas who states that he did speak with the cardiologist who did her cath and she is stable to undergo surgery..  Patient is pending to be seen by the orthopedic doctor for further surgery.  She is having significant pain and says that has not yet used the morphine but did take hydrocodone which did not help.    Patient has been seen on May 10.  She is awaiting to get surgery done today at 3 PM.  She is NPO.  She is complaining of severe pain and is miserable and says that morphine did help her earlier so we will be getting it to every 4 hours as needed.  She is medically cleared Dr. Thomas has seen  the patient and stable for surgery    Vital Signs:    Temp:  [98 °F (36.7 °C)-99.5 °F (37.5 °C)] 98 °F (36.7 °C)  Heart Rate:  [77-88] 86  Resp:  [16-21] 18  BP: (153-164)/(75-96) 153/84    Intake and output:    I/O last 3 completed shifts:  In: 120 [P.O.:120]  Out: 400 [Urine:400]  No intake/output data recorded.    Physical Examination:    General Appearance:    Alert and cooperative, not in any acute distress.   Head:    Atraumatic and normocephalic, without obvious abnormality.   Eyes:            PERRLA,  No pallor. Extraocular movements are within normal limits.   Neck:   Supple,  No lymph glands, no bruit   Lungs:     Chest shape is normal. Breath sounds heard bilaterally equally.  No crackles or wheezing.     Heart:    Normal S1 and S2, no murmur,  No JVD   Abdomen:     Normal bowel sounds, no masses, no organomegaly. Soft     nontender, no guarding, no rebound tenderness   Extremities:   Moves right lower extremity well but left she is unable to move due to fracture no edema, no cyanosis,    Skin:   No  bruising or rash.   Neurologic:   Grossly nonfocal and cannot move the left leg due to fracture     Laboratory results:  Results from last 7 days   Lab Units 05/10/24  0559 05/09/24  0556 05/08/24  1646   SODIUM mmol/L 128* 128* 128*   POTASSIUM mmol/L 4.8 4.0 4.1   CHLORIDE mmol/L 92* 90* 90*   CO2 mmol/L 26.1 24.8 27.2   BUN mg/dL 11 8 8   CREATININE mg/dL 1.05* 0.79 0.84   CALCIUM mg/dL 9.6 9.4 9.0   BILIRUBIN mg/dL 0.7 0.9 0.6   ALK PHOS U/L 81 84 101   ALT (SGPT) U/L 15 15 17   AST (SGOT) U/L 24 23 22   GLUCOSE mg/dL 117* 129* 109*     Results from last 7 days   Lab Units 05/10/24  0559 05/09/24  0556 05/08/24  1646   WBC 10*3/mm3 12.29* 13.64* 10.96*   HEMOGLOBIN g/dL 12.4 12.2 12.6   HEMATOCRIT % 37.5 36.1 37.1   PLATELETS 10*3/mm3 144 176 183     Results from last 7 days   Lab Units 05/08/24  1646   INR  1.02               Radiology results:    Imaging Results (Last 24 Hours)       ** No results found  for the last 24 hours. **            I have reviewed the patient's radiology reports.    Medication Review:     I have reviewed the patients active and prn medications.     Assessment:      Femoral neck fracture    Anxiety    Dyslipidemia    Pill rolling tremors    Depression    Essential hypertension    Gastroesophageal reflux disease with esophagitis without hemorrhage    CAD (coronary artery disease)          Plan:    1.  Left-sided femoral fracture-she is medically stable and cleared to undergo surgery and pending orthopedic evaluation for surgery  Optimize pain control with morphine    2.  Coronary artery disease-had a lengthy discussion with Dr. Thomas and he has evaluated and states that the medically she is stable to undergo surgery and further management will be done as per changes.  Medical management will be continued as per orders    3.  Mild hyponatremia-we will start her on normal saline at present and do urine electrolytes    Continue rest of the orders for all other medical problems and goals of treatment and plan of care has been addressed with the patient in detail and the nursing staff    Rod Vance MD  05/10/24  09:17 EDT      Please note that portions of this note were completed with a voice recognition program.

## 2024-05-10 NOTE — PLAN OF CARE
Goal Outcome Evaluation:              Outcome Evaluation: VSS. No acute events during this shift. Patients pain tolerated with PRN medication. No concerns voiced at this time.

## 2024-05-10 NOTE — OP NOTE
Preoperative Dx: LEFT hip femoral neck fracture  Postoperative Dx: same  Procedure: LEFT hip, hemiarthroplasty  Surgeon: Hal  Asst: none  Complications: none  Specimen: femoral head  EBL: 75cc  Anes: General    Description of procedure:  The patient was identified in the preoperative holding area at [facility] and transported to the operating room and care of myself and the anesthesia staff.  Patient was placed supine, general anesthesia was induced once she was intubated and the tube was secured and she was placed into a right lateral decubitus position with the right side facing up.  The right lower extremity was prepped and draped in the standard sterile fashion.  We performed a timeout to ensure the correct patient procedure and side was being done.  Preoperative antibiotics were delivered.  We then turned our attention towards incision.     A posterior approach to the right hip was performed soft tissue dissection was carried down to the tensor fascia lucy was reached it was divided in line with the incision.  Once this was done we then placed a Charnley retractor beneath the fascia to visualize the deep muscular structures.  We then elevated the short external rotators off of the posterior aspect of the femur including the piriformis tendon.  With this done we were able to visualize the posterior capsule of the hip.  A capsulotomy was performed allowing us to visualize the fracture the femoral neck.  We used a corkscrew device to remove the fractured femoral head.  We visualized the femoral neck fracture and used an oscillating saw to complete the neck cut.  We then turned our attention towards femoral preparation.  A boxed osteotome was used to remove the lateral aspect of the femoral neck.  We used a canal finder to open up the femoral canal.  We then turned our attention towards broaching and sequentially broached up to a size 5 in the Collins Accolade set.  This gave us the appropriate fit  and fill in and the femoral canal.  He then trialed off of the #5 broach using a standard offset neck and standard neck length and a 46 mm head, +0 neck.  This provided the appropriate amount soft tissue balancing and stability.  We removed the trial components and the femoral broach.  We then turned our attention towards placement of the actual component.     The actual Lee #5 femoral component was placed into the femoral canal.  We placed the appropriate bipolar component and reduced the hip.  This afforded similar stability and soft tissue tensioning.  Once this was done we irrigated the wound and closed in a layered fashion.  0 Vicryl suture was used to close the capsule.  #2 Tycron suture was used to close the piriformis.  0 Vicryl suture was used to close the tensor fascia lucy.  Interrupted 2-0 Vicryl suture was used to close of case tissue.  Staples used to close the skin.  Patient tolerated the procedure well.

## 2024-05-11 LAB
ALBUMIN SERPL-MCNC: 3.1 G/DL (ref 3.5–5.2)
ALBUMIN/GLOB SERPL: 1.3 G/DL
ALP SERPL-CCNC: 56 U/L (ref 39–117)
ALT SERPL W P-5'-P-CCNC: 12 U/L (ref 1–33)
ANION GAP SERPL CALCULATED.3IONS-SCNC: 11.1 MMOL/L (ref 5–15)
AST SERPL-CCNC: 21 U/L (ref 1–32)
BASOPHILS # BLD AUTO: 0.01 10*3/MM3 (ref 0–0.2)
BASOPHILS NFR BLD AUTO: 0.1 % (ref 0–1.5)
BILIRUB SERPL-MCNC: 0.5 MG/DL (ref 0–1.2)
BUN SERPL-MCNC: 17 MG/DL (ref 8–23)
BUN/CREAT SERPL: 18.7 (ref 7–25)
CALCIUM SPEC-SCNC: 9.1 MG/DL (ref 8.6–10.5)
CHLORIDE SERPL-SCNC: 98 MMOL/L (ref 98–107)
CO2 SERPL-SCNC: 21.9 MMOL/L (ref 22–29)
CREAT SERPL-MCNC: 0.91 MG/DL (ref 0.57–1)
DEPRECATED RDW RBC AUTO: 49.9 FL (ref 37–54)
EGFRCR SERPLBLD CKD-EPI 2021: 67.6 ML/MIN/1.73
EOSINOPHIL # BLD AUTO: 0.01 10*3/MM3 (ref 0–0.4)
EOSINOPHIL NFR BLD AUTO: 0.1 % (ref 0.3–6.2)
ERYTHROCYTE [DISTWIDTH] IN BLOOD BY AUTOMATED COUNT: 14.6 % (ref 12.3–15.4)
GLOBULIN UR ELPH-MCNC: 2.3 GM/DL
GLUCOSE SERPL-MCNC: 131 MG/DL (ref 65–99)
HCT VFR BLD AUTO: 28.9 % (ref 34–46.6)
HGB BLD-MCNC: 9.3 G/DL (ref 12–15.9)
IMM GRANULOCYTES # BLD AUTO: 0.05 10*3/MM3 (ref 0–0.05)
IMM GRANULOCYTES NFR BLD AUTO: 0.4 % (ref 0–0.5)
LYMPHOCYTES # BLD AUTO: 0.39 10*3/MM3 (ref 0.7–3.1)
LYMPHOCYTES NFR BLD AUTO: 3.1 % (ref 19.6–45.3)
MCH RBC QN AUTO: 29.7 PG (ref 26.6–33)
MCHC RBC AUTO-ENTMCNC: 32.2 G/DL (ref 31.5–35.7)
MCV RBC AUTO: 92.3 FL (ref 79–97)
MONOCYTES # BLD AUTO: 1.08 10*3/MM3 (ref 0.1–0.9)
MONOCYTES NFR BLD AUTO: 8.5 % (ref 5–12)
NEUTROPHILS NFR BLD AUTO: 11.22 10*3/MM3 (ref 1.7–7)
NEUTROPHILS NFR BLD AUTO: 87.8 % (ref 42.7–76)
NRBC BLD AUTO-RTO: 0 /100 WBC (ref 0–0.2)
PLATELET # BLD AUTO: 134 10*3/MM3 (ref 140–450)
PMV BLD AUTO: 10.5 FL (ref 6–12)
POTASSIUM SERPL-SCNC: 4.8 MMOL/L (ref 3.5–5.2)
PROT SERPL-MCNC: 5.4 G/DL (ref 6–8.5)
RBC # BLD AUTO: 3.13 10*6/MM3 (ref 3.77–5.28)
SODIUM SERPL-SCNC: 131 MMOL/L (ref 136–145)
WBC NRBC COR # BLD AUTO: 12.76 10*3/MM3 (ref 3.4–10.8)

## 2024-05-11 PROCEDURE — 25010000002 ENOXAPARIN PER 10 MG: Performed by: ORTHOPAEDIC SURGERY

## 2024-05-11 PROCEDURE — 25010000002 MORPHINE PER 10 MG: Performed by: ORTHOPAEDIC SURGERY

## 2024-05-11 PROCEDURE — 97162 PT EVAL MOD COMPLEX 30 MIN: CPT

## 2024-05-11 PROCEDURE — 25810000003 SODIUM CHLORIDE 0.9 % SOLUTION: Performed by: ORTHOPAEDIC SURGERY

## 2024-05-11 PROCEDURE — 80053 COMPREHEN METABOLIC PANEL: CPT | Performed by: ORTHOPAEDIC SURGERY

## 2024-05-11 PROCEDURE — 85025 COMPLETE CBC W/AUTO DIFF WBC: CPT | Performed by: ORTHOPAEDIC SURGERY

## 2024-05-11 RX ADMIN — METOPROLOL TARTRATE 50 MG: 25 TABLET, FILM COATED ORAL at 20:38

## 2024-05-11 RX ADMIN — PANTOPRAZOLE SODIUM 40 MG: 40 TABLET, DELAYED RELEASE ORAL at 06:04

## 2024-05-11 RX ADMIN — LEVOTHYROXINE SODIUM 25 MCG: 50 TABLET ORAL at 06:04

## 2024-05-11 RX ADMIN — MORPHINE SULFATE 2 MG: 2 INJECTION, SOLUTION INTRAMUSCULAR; INTRAVENOUS at 15:29

## 2024-05-11 RX ADMIN — ATORVASTATIN CALCIUM 80 MG: 80 TABLET, FILM COATED ORAL at 08:30

## 2024-05-11 RX ADMIN — CLOPIDOGREL BISULFATE 75 MG: 75 TABLET ORAL at 08:30

## 2024-05-11 RX ADMIN — HYDROCODONE BITARTRATE AND ACETAMINOPHEN 1 TABLET: 5; 325 TABLET ORAL at 03:31

## 2024-05-11 RX ADMIN — ENOXAPARIN SODIUM 40 MG: 100 INJECTION SUBCUTANEOUS at 08:30

## 2024-05-11 RX ADMIN — ASPIRIN 81 MG: 81 TABLET, COATED ORAL at 08:30

## 2024-05-11 RX ADMIN — HYDROCODONE BITARTRATE AND ACETAMINOPHEN 1 TABLET: 5; 325 TABLET ORAL at 09:57

## 2024-05-11 RX ADMIN — SODIUM CHLORIDE 100 ML/HR: 9 INJECTION, SOLUTION INTRAVENOUS at 21:00

## 2024-05-11 RX ADMIN — METOPROLOL TARTRATE 50 MG: 25 TABLET, FILM COATED ORAL at 08:30

## 2024-05-11 RX ADMIN — LISINOPRIL 40 MG: 10 TABLET ORAL at 08:30

## 2024-05-11 RX ADMIN — MORPHINE SULFATE 2 MG: 2 INJECTION, SOLUTION INTRAMUSCULAR; INTRAVENOUS at 06:12

## 2024-05-11 NOTE — PLAN OF CARE
Goal Outcome Evaluation:  Plan of Care Reviewed With: patient        Progress: no change  Outcome Evaluation: VSS, ox4, with occasional forgetfulness to situation, RA, bedrest, purewick in place, abductor pillow in use. Pt c/o pain, PRN pain meds given as prescribed. Pt denied SOB. Spouse at bedside. Pt got up and stood at bedside with PT. No acute events occured during shift. Plan of care ongoing. Pt expresses no further needs or concerns at this time, call light within reach.

## 2024-05-11 NOTE — PLAN OF CARE
Goal Outcome Evaluation:              Outcome Evaluation: VSS on 2L NC. No acute events during this shift. Patients pain tolerated with PRN medication. No concerns voiced at this time.

## 2024-05-11 NOTE — PLAN OF CARE
Goal Outcome Evaluation:  Plan of Care Reviewed With: patient        Progress: no change  Outcome Evaluation: Patient participated well in PT evaluation and demonstrated decreased overall mobility s/p left hip hemiarthropasty.  SANTIAGO precautions are reviewed and she requires frequent verbal cues to maintain precautions with mobility.  She requires moderate assistance to perform bed mobility.  She was able to perform sit to stand with moderate assistance, she became light headedand began to tremor and is returned to sitting.  She is eager to walk; however, it is explained that she is not able to walk until she can tolerate sitting and standing without getting light headed.  Patient is expected to benefit from additional PT while hospitalized and upon discharge to inpatient rehab.      Anticipated Discharge Disposition (PT): inpatient rehabilitation facility

## 2024-05-11 NOTE — PAYOR COMM NOTE
"Trang Maynard (69 y.o. Female)       Date of Birth   1955    Social Security Number       Address   [address] [id number]    Home Phone   [phone]    MRN   [id number]       Episcopal   None    Marital Status                               Admission Date   5/8/24    Admission Type   Emergency    Admitting Provider   Rod Vance MD    Attending Provider   Rod Vance MD    Department, Room/Bed   [facility] TELEMETRY SDS OVERFLOW, T04/01       Discharge Date       Discharge Disposition       Discharge Destination                                 Attending Provider: Rod Vance MD    Allergies: Penicillins    Isolation: None   Infection: None   Code Status: CPR    Ht: 170.2 cm (67\")   Wt: 58.4 kg (128 lb 12 oz)    Admission Cmt: None   Principal Problem: Femoral neck fracture [S72.009A]                   Active Insurance as of 5/8/2024       Primary Coverage       Payor Plan Insurance Group Employer/Plan Group    [facility] MEDICARE REPLACEMENT [facility] MED ADV SNP HMO [id number]       Payor Plan Address Payor Plan Phone Number Payor Plan Fax Number Effective Dates    [address] [address]   1/1/2023 - None Entered    [facility] [id number]         Subscriber Name Subscriber Birth Date Member ID       TRANG MAYNARD 1955 [id number]                     Emergency Contacts        (Rel.) Home Phone Work Phone Mobile Phone    Seymour Maynard (Spouse) [phone] -- --    NatiMojgan herndon (Daughter) -- -- [phone]              Lab Results (last 48 hours)       Procedure Component Value Units Date/Time    CBC & Differential [[id number]]  (Abnormal) Collected: 05/11/24 0610    Specimen: Blood Updated: 05/11/24 0707    Narrative:      The following orders were created for panel order CBC & Differential.  Procedure                               Abnormality         Status                     ---------                        "        -----------         ------                     CBC Auto Differential[[id number]]        Abnormal            Final result                 Please view results for these tests on the individual orders.    CBC Auto Differential [[id number]]  (Abnormal) Collected: 05/11/24 0610    Specimen: Blood Updated: 05/11/24 0707     WBC 12.76 10*3/mm3      RBC 3.13 10*6/mm3      Hemoglobin 9.3 g/dL      Hematocrit 28.9 %      MCV 92.3 fL      MCH 29.7 pg      MCHC 32.2 g/dL      RDW 14.6 %      RDW-SD 49.9 fl      MPV 10.5 fL      Platelets 134 10*3/mm3      Neutrophil % 87.8 %      Lymphocyte % 3.1 %      Monocyte % 8.5 %      Eosinophil % 0.1 %      Basophil % 0.1 %      Immature Grans % 0.4 %      Neutrophils, Absolute 11.22 10*3/mm3      Lymphocytes, Absolute 0.39 10*3/mm3      Monocytes, Absolute 1.08 10*3/mm3      Eosinophils, Absolute 0.01 10*3/mm3      Basophils, Absolute 0.01 10*3/mm3      Immature Grans, Absolute 0.05 10*3/mm3      nRBC 0.0 /100 WBC     Comprehensive Metabolic Panel [[id number]]  (Abnormal) Collected: 05/11/24 0610    Specimen: Blood Updated: 05/11/24 0638     Glucose 131 mg/dL      BUN 17 mg/dL      Creatinine 0.91 mg/dL      Sodium 131 mmol/L      Potassium 4.8 mmol/L      Comment: Slight hemolysis detected by analyzer. Result may be falsely elevated.        Chloride 98 mmol/L      CO2 21.9 mmol/L      Calcium 9.1 mg/dL      Total Protein 5.4 g/dL      Albumin 3.1 g/dL      ALT (SGPT) 12 U/L      AST (SGOT) 21 U/L      Alkaline Phosphatase 56 U/L      Total Bilirubin 0.5 mg/dL      Globulin 2.3 gm/dL      A/G Ratio 1.3 g/dL      BUN/Creatinine Ratio 18.7     Anion Gap 11.1 mmol/L      eGFR 67.6 mL/min/1.73     Narrative:      GFR Normal >60  Chronic Kidney Disease <60  Kidney Failure <15    The GFR formula is only valid for adults with stable renal function between ages 18 and 70.    Comprehensive Metabolic Panel [[id number]]  (Abnormal) Collected: 05/10/24 0929    Specimen: Blood Updated: 05/10/24  1009     Glucose 111 mg/dL      BUN 12 mg/dL      Creatinine 1.01 mg/dL      Sodium 130 mmol/L      Potassium 4.3 mmol/L      Chloride 94 mmol/L      CO2 24.7 mmol/L      Calcium 9.5 mg/dL      Total Protein 6.7 g/dL      Albumin 3.8 g/dL      ALT (SGPT) 15 U/L      AST (SGOT) 23 U/L      Alkaline Phosphatase 82 U/L      Total Bilirubin 0.6 mg/dL      Globulin 2.9 gm/dL      A/G Ratio 1.3 g/dL      BUN/Creatinine Ratio 11.9     Anion Gap 11.3 mmol/L      eGFR 59.6 mL/min/1.73     Narrative:      GFR Normal >60  Chronic Kidney Disease <60  Kidney Failure <15    The GFR formula is only valid for adults with stable renal function between ages 18 and 70.    CBC (No Diff) [[id number]]  (Abnormal) Collected: 05/10/24 0929    Specimen: Blood Updated: 05/10/24 0945     WBC 11.14 10*3/mm3      RBC 4.13 10*6/mm3      Hemoglobin 12.3 g/dL      Hematocrit 37.3 %      MCV 90.3 fL      MCH 29.8 pg      MCHC 33.0 g/dL      RDW 14.6 %      RDW-SD 48.9 fl      MPV 9.7 fL      Platelets 145 10*3/mm3     Comprehensive Metabolic Panel [[id number]]  (Abnormal) Collected: 05/10/24 0559    Specimen: Blood Updated: 05/10/24 0701     Glucose 117 mg/dL      BUN 11 mg/dL      Creatinine 1.05 mg/dL      Sodium 128 mmol/L      Potassium 4.8 mmol/L      Chloride 92 mmol/L      CO2 26.1 mmol/L      Calcium 9.6 mg/dL      Total Protein 6.6 g/dL      Albumin 3.8 g/dL      ALT (SGPT) 15 U/L      AST (SGOT) 24 U/L      Alkaline Phosphatase 81 U/L      Total Bilirubin 0.7 mg/dL      Globulin 2.8 gm/dL      A/G Ratio 1.4 g/dL      BUN/Creatinine Ratio 10.5     Anion Gap 9.9 mmol/L      eGFR 56.9 mL/min/1.73     Narrative:      GFR Normal >60  Chronic Kidney Disease <60  Kidney Failure <15    The GFR formula is only valid for adults with stable renal function between ages 18 and 70.    CBC (No Diff) [[id number]]  (Abnormal) Collected: 05/10/24 0559    Specimen: Blood Updated: 05/10/24 0648     WBC 12.29 10*3/mm3      RBC 4.14 10*6/mm3      Hemoglobin 12.4  g/dL      Hematocrit 37.5 %      MCV 90.6 fL      MCH 30.0 pg      MCHC 33.1 g/dL      RDW 14.6 %      RDW-SD 49.1 fl      MPV 10.1 fL      Platelets 144 10*3/mm3           Imaging Results (Last 48 Hours)       ** No results found for the last 48 hours. **             Physician Progress Notes (last 48 hours)        Rod Vance MD at 24 1203                [facility]  INTERNAL MEDICINE PROGRESS NOTE    Name:  Trang Maynard   Age:  69 y.o.  Sex:  female  :  1955  MRN:  [id number]   Visit Number:  [id number]  Admission Date:  2024  Date Of Service:  24  Primary Care Physician:  Rod Vance MD     LOS: 3 days :  Patient Care Team:  Rod Vance MD as PCP - General (Internal Medicine)  Gael Baker MD as Consulting Physician (Cardiology):      Subjective / Interval History:       69-year-old patient with past medical history of coronary artery disease with recent cath, hypertension, depression, GERD, tremors, anxiety disorder, who comes into the ER s/p fall  She fell accidentally could not get up and EMS was called.  She was found to have left femoral fracture and Dr. Hong was consulted who recommended admission for surgery and being the primary I was contacted for her to be admitted.     At present upon evaluation she denies any chest pain nausea vomiting still is having severe pain unable to move her leg.  She is very nervous anxious and scared.  She also has been having tremors which has been from before.  She states that she did have a heart cath and they found distal ostial blockage which could not be stented and has been on medical management.    After admission she had medical and cardiac clearance done and patient had surgery.  Surgery went fine and she is postop still having some pain    Patient seen today on May 8 which is postop day 1.  She is saying that the pain is bad and is not able to move it.  She has not yet had physical therapy.  She is able  to eat and no nausea or vomiting    Vital Signs:    Temp:  [96.9 °F (36.1 °C)-98.1 °F (36.7 °C)] 98.1 °F (36.7 °C)  Heart Rate:  [73-97] 90  Resp:  [8-18] 16  BP: (114-159)/() 114/64    Intake and output:    I/O last 3 completed shifts:  In: 200 [I.V.:200]  Out: 800 [Urine:800]  I/O this shift:  In: 240 [P.O.:240]  Out: 100 [Urine:100]    Physical Examination:    General Appearance:    Alert and cooperative, not in any acute distress.   Head:    Atraumatic and normocephalic, without obvious abnormality.   Eyes:            PERRLA,  No pallor. Extraocular movements are within normal limits.   Neck:   Supple,  No lymph glands, no bruit   Lungs:     Chest shape is normal. Breath sounds heard bilaterally equally.  No crackles or wheezing.     Heart:    Normal S1 and S2, no murmur,  No JVD   Abdomen:     Normal bowel sounds, no masses, no organomegaly. Soft     nontender, no guarding, no rebound tenderness   Extremities:   Moves right lower extremity well but left she is unable to move due to pain no edema, no cyanosis,    Skin:   No  bruising or rash.   Neurologic:   Grossly nonfocal and gait cannot be checked with her postop condition     Laboratory results:  Results from last 7 days   Lab Units 05/11/24  0610 05/10/24  0929 05/10/24  0559   SODIUM mmol/L 131* 130* 128*   POTASSIUM mmol/L 4.8 4.3 4.8   CHLORIDE mmol/L 98 94* 92*   CO2 mmol/L 21.9* 24.7 26.1   BUN mg/dL 17 12 11   CREATININE mg/dL 0.91 1.01* 1.05*   CALCIUM mg/dL 9.1 9.5 9.6   BILIRUBIN mg/dL 0.5 0.6 0.7   ALK PHOS U/L 56 82 81   ALT (SGPT) U/L 12 15 15   AST (SGOT) U/L 21 23 24   GLUCOSE mg/dL 131* 111* 117*     Results from last 7 days   Lab Units 05/11/24  0610 05/10/24  0929 05/10/24  0559   WBC 10*3/mm3 12.76* 11.14* 12.29*   HEMOGLOBIN g/dL 9.3* 12.3 12.4   HEMATOCRIT % 28.9* 37.3 37.5   PLATELETS 10*3/mm3 134* 145 144     Results from last 7 days   Lab Units 05/08/24  1646   INR  1.02               Radiology results:    Imaging Results (Last  24 Hours)       ** No results found for the last 24 hours. **            I have reviewed the patient's radiology reports.    Medication Review:     I have reviewed the patients active and prn medications.     Assessment:      Anxiety    Dyslipidemia    Pill rolling tremors    Depression    Essential hypertension    Gastroesophageal reflux disease with esophagitis without hemorrhage    CAD (coronary artery disease)          Plan:    1.  Left-sided femoral fracture-she is postop day 1  Physical therapy as per recommendation of orthopedic  Optimize pain control with morphine    2.  Coronary artery disease-she is currently on medical management and stable    3.  Mild hyponatremia-sodium is improving and she has only mild hyponatremia    Continue rest of the orders for all other medical problems and goals of treatment and plan of care has been addressed with the patient in detail and the nursing staff  She will benefit from rehab and wants to go for inpatient rehab    Rod Vance MD  24  12:03 EDT      Please note that portions of this note were completed with a voice recognition program.        Electronically signed by Rod Vance MD at 24 1206       Perez Del Castillo MD at 24 1115          Patient is seen today on the floor she is without new complaints she does complain of some pain to the left hip.  But states that her pain medicine has been helping.      Her wound has minimal bloody drainage    She is intact EHL FHL gastroc and tib ant      My impression is status post left hip hemiarthroplasty    Plan is for physical therapy  Weightbearing as tolerated  Discharge planning  Posterior hip cautions    Electronically signed by Perez Del Castillo MD at 24 1115       Rod Vance MD at 05/10/24 0900                [facility]  INTERNAL MEDICINE PROGRESS NOTE    Name:  Trang Maynard   Age:  69 y.o.  Sex:  female  :  1955  MRN:  [id number]   Visit Number:   [id number]  Admission Date:  5/8/2024  Date Of Service:  05/10/24  Primary Care Physician:  Rod Vance MD     LOS: 2 days :  Patient Care Team:  Rod Vance MD as PCP - General (Internal Medicine)  Gael Baker MD as Consulting Physician (Cardiology):      Subjective / Interval History:       69-year-old patient with past medical history of coronary artery disease with recent cath, hypertension, depression, GERD, tremors, anxiety disorder, who comes into the ER s/p fall  She fell accidentally could not get up and EMS was called.  She was found to have left femoral fracture and Dr. Hong was consulted who recommended admission for surgery and being the primary I was contacted for her to be admitted.     At present upon evaluation she denies any chest pain nausea vomiting still is having severe pain unable to move her leg.  She is very nervous anxious and scared.  She also has been having tremors which has been from before.  She states that she did have a heart cath and they found distal ostial blockage which could not be stented and has been on medical management.      Patient has been seen today on May 9.  Case discussed with Dr. Thomas who states that he did speak with the cardiologist who did her cath and she is stable to undergo surgery..  Patient is pending to be seen by the orthopedic doctor for further surgery.  She is having significant pain and says that has not yet used the morphine but did take hydrocodone which did not help.    Patient has been seen on May 10.  She is awaiting to get surgery done today at 3 PM.  She is NPO.  She is complaining of severe pain and is miserable and says that morphine did help her earlier so we will be getting it to every 4 hours as needed.  She is medically cleared Dr. Thomas has seen the patient and stable for surgery    Vital Signs:    Temp:  [98 °F (36.7 °C)-99.5 °F (37.5 °C)] 98 °F (36.7 °C)  Heart Rate:  [77-88] 86  Resp:  [16-21] 18  BP:  (153-164)/(75-96) 153/84    Intake and output:    I/O last 3 completed shifts:  In: 120 [P.O.:120]  Out: 400 [Urine:400]  No intake/output data recorded.    Physical Examination:    General Appearance:    Alert and cooperative, not in any acute distress.   Head:    Atraumatic and normocephalic, without obvious abnormality.   Eyes:            PERRLA,  No pallor. Extraocular movements are within normal limits.   Neck:   Supple,  No lymph glands, no bruit   Lungs:     Chest shape is normal. Breath sounds heard bilaterally equally.  No crackles or wheezing.     Heart:    Normal S1 and S2, no murmur,  No JVD   Abdomen:     Normal bowel sounds, no masses, no organomegaly. Soft     nontender, no guarding, no rebound tenderness   Extremities:   Moves right lower extremity well but left she is unable to move due to fracture no edema, no cyanosis,    Skin:   No  bruising or rash.   Neurologic:   Grossly nonfocal and cannot move the left leg due to fracture     Laboratory results:  Results from last 7 days   Lab Units 05/10/24  0559 05/09/24  0556 05/08/24  1646   SODIUM mmol/L 128* 128* 128*   POTASSIUM mmol/L 4.8 4.0 4.1   CHLORIDE mmol/L 92* 90* 90*   CO2 mmol/L 26.1 24.8 27.2   BUN mg/dL 11 8 8   CREATININE mg/dL 1.05* 0.79 0.84   CALCIUM mg/dL 9.6 9.4 9.0   BILIRUBIN mg/dL 0.7 0.9 0.6   ALK PHOS U/L 81 84 101   ALT (SGPT) U/L 15 15 17   AST (SGOT) U/L 24 23 22   GLUCOSE mg/dL 117* 129* 109*     Results from last 7 days   Lab Units 05/10/24  0559 05/09/24  0556 05/08/24  1646   WBC 10*3/mm3 12.29* 13.64* 10.96*   HEMOGLOBIN g/dL 12.4 12.2 12.6   HEMATOCRIT % 37.5 36.1 37.1   PLATELETS 10*3/mm3 144 176 183     Results from last 7 days   Lab Units 05/08/24  1646   INR  1.02               Radiology results:    Imaging Results (Last 24 Hours)       ** No results found for the last 24 hours. **            I have reviewed the patient's radiology reports.    Medication Review:     I have reviewed the patients active and prn  medications.     Assessment:      Femoral neck fracture    Anxiety    Dyslipidemia    Pill rolling tremors    Depression    Essential hypertension    Gastroesophageal reflux disease with esophagitis without hemorrhage    CAD (coronary artery disease)          Plan:    1.  Left-sided femoral fracture-she is medically stable and cleared to undergo surgery and pending orthopedic evaluation for surgery  Optimize pain control with morphine    2.  Coronary artery disease-had a lengthy discussion with Dr. Thomas and he has evaluated and states that the medically she is stable to undergo surgery and further management will be done as per changes.  Medical management will be continued as per orders    3.  Mild hyponatremia-we will start her on normal saline at present and do urine electrolytes    Continue rest of the orders for all other medical problems and goals of treatment and plan of care has been addressed with the patient in detail and the nursing staff    Rod Vance MD  05/10/24  09:17 EDT      Please note that portions of this note were completed with a voice recognition program.        Electronically signed by Rod Vance MD at 05/10/24 0919       Rod Vance MD at 24 1713                [facility]  INTERNAL MEDICINE PROGRESS NOTE    Name:  Trang Maynard   Age:  69 y.o.  Sex:  female  :  1955  MRN:  [id number]   Visit Number:  [id number]  Admission Date:  2024  Date Of Service:  24  Primary Care Physician:  Rod Vance MD     LOS: 1 day :  Patient Care Team:  Rod Vance MD as PCP - General (Internal Medicine)  Gael Baker MD as Consulting Physician (Cardiology):      Subjective / Interval History:       69-year-old patient with past medical history of coronary artery disease with recent cath, hypertension, depression, GERD, tremors, anxiety disorder, who comes into the ER s/p fall  She fell accidentally could not get up and EMS was  called.  She was found to have left femoral fracture and Dr. Hong was consulted who recommended admission for surgery and being the primary I was contacted for her to be admitted.     At present upon evaluation she denies any chest pain nausea vomiting still is having severe pain unable to move her leg.  She is very nervous anxious and scared.  She also has been having tremors which has been from before.  She states that she did have a heart cath and they found distal ostial blockage which could not be stented and has been on medical management.      Patient has been seen today on May 9.  Case discussed with Dr. Thoams who states that he did speak with the cardiologist who did her cath and she is stable to undergo surgery..  Patient is pending to be seen by the orthopedic doctor for further surgery.  She is having significant pain and says that has not yet used the morphine but did take hydrocodone which did not    Vital Signs:    Temp:  [98.2 °F (36.8 °C)-99.5 °F (37.5 °C)] 99.5 °F (37.5 °C)  Heart Rate:  [68-86] 84  Resp:  [8-22] 18  BP: (134-191)/() 155/91    Intake and output:    No intake/output data recorded.  No intake/output data recorded.    Physical Examination:    General Appearance:    Alert and cooperative, not in any acute distress.   Head:    Atraumatic and normocephalic, without obvious abnormality.   Eyes:            PERRLA,  No pallor. Extraocular movements are within normal limits.   Neck:   Supple,  No lymph glands, no bruit   Lungs:     Chest shape is normal. Breath sounds heard bilaterally equally.  No crackles or wheezing.     Heart:    Normal S1 and S2, no murmur,  No JVD   Abdomen:     Normal bowel sounds, no masses, no organomegaly. Soft     nontender, no guarding, no rebound tenderness   Extremities:   Moves right lower extremity well but left she is unable to move due to fracture no edema, no cyanosis,    Skin:   No  bruising or rash.   Neurologic:   Grossly nonfocal and cannot  move the left leg due to fracture     Laboratory results:  Results from last 7 days   Lab Units 05/09/24  0556 05/08/24  1646   SODIUM mmol/L 128* 128*   POTASSIUM mmol/L 4.0 4.1   CHLORIDE mmol/L 90* 90*   CO2 mmol/L 24.8 27.2   BUN mg/dL 8 8   CREATININE mg/dL 0.79 0.84   CALCIUM mg/dL 9.4 9.0   BILIRUBIN mg/dL 0.9 0.6   ALK PHOS U/L 84 101   ALT (SGPT) U/L 15 17   AST (SGOT) U/L 23 22   GLUCOSE mg/dL 129* 109*     Results from last 7 days   Lab Units 05/09/24  0556 05/08/24  1646   WBC 10*3/mm3 13.64* 10.96*   HEMOGLOBIN g/dL 12.2 12.6   HEMATOCRIT % 36.1 37.1   PLATELETS 10*3/mm3 176 183     Results from last 7 days   Lab Units 05/08/24  1646   INR  1.02               Radiology results:    Imaging Results (Last 24 Hours)       ** No results found for the last 24 hours. **            I have reviewed the patient's radiology reports.    Medication Review:     I have reviewed the patients active and prn medications.     Assessment:      Femoral neck fracture    Anxiety    Dyslipidemia    Pill rolling tremors    Depression    Essential hypertension    Gastroesophageal reflux disease with esophagitis without hemorrhage    CAD (coronary artery disease)          Plan:    1.  Left-sided femoral fracture-she is medically stable and cleared to undergo surgery and pending orthopedic evaluation for surgery  Optimize pain control with morphine    2.  Coronary artery disease-had a lengthy discussion with Dr. Thomas and he has evaluated and states that the medically she is stable to undergo surgery and further management will be done as per changes.  Medical management will be continued as per orders    Continue rest of the orders for all other medical problems and goals of treatment and plan of care has been addressed with the patient in detail and the nursing staff    Rod Vance MD  05/09/24  17:13 EDT      Please note that portions of this note were completed with a voice recognition program.        Electronically  signed by Rod Vance MD at 05/09/24 1717       Consult Notes (last 48 hours)  Notes from 05/09/24 1422 through 05/11/24 1422   No notes of this type exist for this encounter.

## 2024-05-11 NOTE — PROGRESS NOTES
Patient is seen today on the floor she is without new complaints she does complain of some pain to the left hip.  But states that her pain medicine has been helping.      Her wound has minimal bloody drainage    She is intact EHL FHL gastroc and tib ant      My impression is status post left hip hemiarthroplasty    Plan is for physical therapy  Weightbearing as tolerated  Discharge planning  Posterior hip cautions

## 2024-05-11 NOTE — THERAPY EVALUATION
Patient Name: Trang Maynard  : 1955    MRN: [id number]                              Today's Date: 2024       Admit Date: 2024    Visit Dx:     ICD-10-CM ICD-9-CM   1. Left displaced femoral neck fracture  S72.002A 820.8   2. Hip fracture  S72.009A 820.8     Patient Active Problem List   Diagnosis    Uterine fibroid    Anxiety    Dyslipidemia    Pill rolling tremors    Hyponatremia    Hypokalemia    Hypomagnesemia    Depression    Essential hypertension    Gastroparesis    Gastroesophageal reflux disease with esophagitis without hemorrhage    Irritable bowel syndrome with diarrhea    CAD (coronary artery disease)     Past Medical History:   Diagnosis Date    Anxiety     Arthritis     CAD (coronary artery disease)     stent     Chronic kidney disease     SEES DR. MIRANDA    Constipation     Depression     Diabetes mellitus     Diarrhea     Dysphagia     SPOUSE REPORTS VERY MILD.    Elevated cholesterol     Gastric ulcer     GERD (gastroesophageal reflux disease)     Heart disease     History of chest pain     SPOUSE REPORTS APPROXIMATLEY FEW MONTHS AGO AND THAT PATIENT HAD WORK UP WITH DR MAGANA.  STATES HAD NUCLEAR STRESS AND IT WAS WNL.      Hyperlipidemia     Hypertension     Hypokalemia     Impaired functional mobility, balance, gait, and endurance     Kidney stone     Low sodium levels     Psychosis     PVD (peripheral vascular disease)     Schizophrenia     SPOUSE REPORTED    Seizures 2016    ONLY HAD ONE EPISODE.  STATED SIDE EFFECTS OF MEDICATION    Sleep apnea     SPOUSE REPORTS PATIENT WAS NOT ABLE TO TOLERATE THE CPAP.      Thyroid condition     TIA (transient ischemic attack)     .  SPOUSE REPORTS NO RESIDUAL PROBLEMS.    Tremors of nervous system     Poss. r/t medications pt is on.    Uterine fibroid     Vitamin B12 deficiency      Past Surgical History:   Procedure Laterality Date    CARDIAC CATHETERIZATION      SPOUSE REPORTS  AND  (REPORTS A TOTAL OF 4  STENTS WERE PLACED)    CARDIAC SURGERY      SPOUSE REPORTS 2 STENTS PLACED IN 2005 AND 2 STENTS IN 2007    COLONOSCOPY N/A 5/31/2018    Procedure: COLONOSCOPY with cold biopsy polypectomies and biopsies;  Surgeon: Servando Rooney MD;  Location: [facility] ENDOSCOPY;  Service: Gastroenterology    CYST REMOVAL      SPOUSE REPORTS REMOVED FROM NECK    ENDOSCOPY N/A 9/28/2017    Procedure: ESOPHAGOGASTRODUODENOSCOPY WITH BIOPSIES;  Surgeon: Servando Rooney MD;  Location: [facility] ENDOSCOPY;  Service:     ENDOSCOPY N/A 1/25/2018    Procedure: ESOPHAGOGASTRODUODENOSCOPY with biopsies;  Surgeon: Servando Rooney MD;  Location: [facility] ENDOSCOPY;  Service:     ENDOSCOPY      ENDOSCOPY N/A 7/26/2018    Procedure: ESOPHAGOGASTRODUODENOSCOPY WITH BIOPSIES;  Surgeon: Servando Rooney MD;  Location: [facility] ENDOSCOPY;  Service: Gastroenterology    OTHER SURGICAL HISTORY      SPOUSE REPORTS PATIENT ATTEMPTED COLONOSCOPY BEFORE BUT PREP WAS NOT COMPLETE    TUBAL ABDOMINAL LIGATION        General Information       Row Name 05/11/24 1057          Physical Therapy Time and Intention    Document Type evaluation  -JR     Mode of Treatment physical therapy  -JR       Row Name 05/11/24 1057          General Information    Patient Profile Reviewed yes  -JR     Prior Level of Function independent:;all household mobility;community mobility  -JR     Existing Precautions/Restrictions left;hip, posterior;fall  -JR     Barriers to Rehab --  difficulty remembering SANTIAGO precautions during movement  -JR       Row Name 05/11/24 1057          Living Environment    People in Home spouse  -JR       Row Name 05/11/24 1057          Home Main Entrance    Number of Stairs, Main Entrance none  -JR       Row Name 05/11/24 1057          Stairs Within Home, Primary    Number of Stairs, Within Home, Primary none  -JR       Row Name 05/11/24 1057          Cognition    Orientation Status (Cognition) oriented to;person;time  -JR       Row Name 05/11/24 1057          Safety  Issues, Functional Mobility    Safety Issues Affecting Function (Mobility) safety precautions follow-through/compliance;awareness of need for assistance;insight into deficits/self-awareness;impulsivity;positioning of assistive device;judgment;other (see comments)  difficulty maintaining SANTIAGO precautions  -     Impairments Affecting Function (Mobility) strength;endurance/activity tolerance;balance;pain;postural/trunk control;shortness of breath;cognition  -JR     Cognitive Impairments, Mobility Safety/Performance awareness, need for assistance;insight into deficits/self-awareness;problem-solving/reasoning;safety precaution awareness;attention;impulsivity  -               User Key  (r) = Recorded By, (t) = Taken By, (c) = Cosigned By      Initials Name Provider Type    JR Michaela Batista, PT Physical Therapist                   Mobility       Row Name 05/11/24 1057          Bed Mobility    Bed Mobility supine-sit;sit-supine  -     Supine-Sit [place] (Bed Mobility) moderate assist (50% patient effort)  -     Sit-Supine [place] (Bed Mobility) moderate assist (50% patient effort)  -     Assistive Device (Bed Mobility) head of bed elevated;bed rails  -     Comment, (Bed Mobility) Patient sat on the edge of the bed x 3 minutes and became nauseated and dizzy, BP was taken and it was within her normal range  -       Row Name 05/11/24 1057          Sit-Stand Transfer    Sit-Stand [place] (Transfers) moderate assist (50% patient effort);verbal cues  instructions to maintain SANTIAGO precautions  -     Assistive Device (Sit-Stand Transfers) walker, front-wheeled  -JR     Comment, (Sit-Stand Transfer) she began having tremors and had to sit after only a 10 seconds  -       Row Name 05/11/24 1057          Gait/Stairs (Locomotion)    [place] Level (Gait) not tested  -       Row Name 05/11/24 1057          Mobility    Extremity Weight-bearing Status left lower extremity  -JR     Left Lower Extremity  (Weight-bearing Status) weight-bearing as tolerated (WBAT)  -JR               User Key  (r) = Recorded By, (t) = Taken By, (c) = Cosigned By      Initials Name Provider Type    Michaela Chavira, YAMILE Physical Therapist                   Obj/Interventions       [facility] Name 05/11/24 1057          Range of Motion Comprehensive    Comment, General Range of Motion left hip limited s/p ORIF of femur  -[facility] Name 05/11/24 1057          Strength Comprehensive (MMT)    Comment, General Manual Muscle Testing (MMT) Assessment LLE=2/5  -JR       [facility] Name 05/11/24 1057          Balance    Balance Assessment sitting static balance;sitting dynamic balance;sit to stand dynamic balance;standing static balance;standing dynamic balance  -JR     Static Sitting Balance contact guard  -JR     Dynamic Sitting Balance minimal assist  -JR     Position, Sitting Balance unsupported;sitting edge of bed  -JR     Sit to Stand Dynamic Balance moderate assist  -JR     Static Standing Balance moderate assist  -JR     Dynamic Standing Balance moderate assist  -JR     Position/Device Used, Standing Balance walker, rolling  -JR               User Key  (r) = Recorded By, (t) = Taken By, (c) = Cosigned By      Initials Name Provider Type    Michaela Chavira, PT Physical Therapist                   Goals/Plan       Row Name 05/11/24 1057          Bed Mobility Goal 1 (PT)    Activity/Assistive Device (Bed Mobility Goal 1, PT) bed mobility activities, all  -JR     [place] Level/Cues Needed (Bed Mobility Goal 1, PT) contact guard required  -JR     Time Frame (Bed Mobility Goal 1, PT) short term goal (STG)  -JR     Strategies/Barriers (Bed Mobility Goal 1, PT) Maintaining SANTIAGO precaution  -JR     Progress/Outcomes (Bed Mobility Goal 1, PT) goal ongoing  -JR       [facility] Name 05/11/24 1057          Transfer Goal 1 (PT)    Activity/Assistive Device (Transfer Goal 1, PT) sit-to-stand/stand-to-sit;bed-to-chair/chair-to-bed  -JR     [place] Level/Cues  Needed (Transfer Goal 1, PT) minimum assist (75% or more patient effort)  -JR     Time Frame (Transfer Goal 1, PT) long term goal (LTG)  -JR     Strategies/Barriers (Transfers Goal 1, PT) maintaining SANTIAGO precautions  -JR     Progress/Outcome (Transfer Goal 1, PT) goal ongoing  -JR       Row Name 05/11/24 1057          Gait Training Goal 1 (PT)    Activity/Assistive Device (Gait Training Goal 1, PT) gait (walking locomotion);walker, rolling;increase endurance/gait distance;diminish gait deviation;improve balance and speed;decrease fall risk  -JR     [place] Level (Gait Training Goal 1, PT) minimum assist (75% or more patient effort)  -JR     Distance (Gait Training Goal 1, PT) 75  -JR     Time Frame (Gait Training Goal 1, PT) long term goal (LTG)  -JR     Progress/Outcome (Gait Training Goal 1, PT) goal ongoing  -JR       Row Name 05/11/24 1057          Patient Education Goal (PT)    Activity (Patient Education Goal, PT) Perform LLE exercises x 10  -JR     [place]/Cues/Accuracy (Memory Goal 2, PT) demonstrates adequately  -JR     Time Frame (Patient Education Goal, PT) 3 days  -JR     Progress/Outcome (Patient Education Goal, PT) goal ongoing  -JR       Row Name 05/11/24 1057          Therapy Assessment/Plan (PT)    Planned Therapy Interventions (PT) strengthening;balance training;bed mobility training;transfer training;gait training;home exercise program;patient/family education  -JR               User Key  (r) = Recorded By, (t) = Taken By, (c) = Cosigned By      Initials Name Provider Type    JR Michaela Batista, PT Physical Therapist                   Clinical Impression       Row Name 05/11/24 1057          Pain    Pretreatment Pain Rating 8/10  -JR     Posttreatment Pain Rating 6/10  -JR     Pain Location - Side/Orientation Left  -JR     Pain Location - hip  -JR     Pain Intervention(s) Repositioned;Ambulation/increased activity  -JR     Additional Documentation Pain Scale: Numbers Pre/Post-Treatment  (Group)  -       Row Name 05/11/24 1057          Plan of Care Review    Plan of Care Reviewed With patient  -JR     Progress no change  -     Outcome Evaluation Patient participated well in PT evaluation and demonstrated decreased overall mobility s/p left hip hemiarthropasty.  SANTIAGO precautions are reviewed and she requires frequent verbal cues to maintain precautions with mobility.  She requires moderate assistance to perform bed mobility.  She was able to perform sit to stand with moderate assistance, she became light headedand began to tremor and is returned to sitting.  She is eager to walk; however, it is explained that she is not able to walk until she can tolerate sitting and standing without getting light headed.  Patient is expected to benefit from additional PT while hospitalized and upon discharge to inpatient rehab.  -       Row Name 05/11/24 1057          Therapy Assessment/Plan (PT)    Patient/Family Therapy Goals Statement (PT) Patient is eager to walk  -     Rehab Potential (PT) good, to achieve stated therapy goals  -     Criteria for Skilled Interventions Met (PT) yes;meets criteria;skilled treatment is necessary  -     Therapy Frequency (PT) daily  -       Row Name 05/11/24 1057          Positioning and Restraints    Pre-Treatment Position in bed  -JR     Post Treatment Position bed  -JR     In Bed fowlers;call light within reach;encouraged to call for assist;ABD pillow;notified ns  -               User Key  (r) = Recorded By, (t) = Taken By, (c) = Cosigned By      Initials Name Provider Type    Michaela Chavira, PT Physical Therapist                   Outcome Measures       Row Name 05/11/24 1600 05/11/24 1200       How much help from another person do you currently need...    Turning from your back to your side while in flat bed without using bedrails? 2  -SP 2  -SP    Moving from lying on back to sitting on the side of a flat bed without bedrails? 2  -SP 2  -SP    Moving to and  from a bed to a chair (including a wheelchair)? 1  -SP 1  -SP    Standing up from a chair using your arms (e.g., wheelchair, bedside chair)? 1  -SP 1  -SP    Climbing 3-5 steps with a railing? 1  -SP 1  -SP    To walk in hospital room? 1  -SP 1  -SP    AM-PAC 6 Clicks Score (PT) 8  -SP 8  -SP    Highest Level of Mobility Goal 3 --> Sit at edge of bed  -SP 3 --> Sit at edge of bed  -SP      Row Name 05/11/24 1057 05/11/24 0800       How much help from another person do you currently need...    Turning from your back to your side while in flat bed without using bedrails? 2  -JR 2  -SP    Moving from lying on back to sitting on the side of a flat bed without bedrails? 2  -JR 2  -SP    Moving to and from a bed to a chair (including a wheelchair)? 1  -JR 1  -SP    Standing up from a chair using your arms (e.g., wheelchair, bedside chair)? 1  -JR 1  -SP    Climbing 3-5 steps with a railing? 1  -JR 1  -SP    To walk in hospital room? 1  -JR 1  -SP    AM-PAC 6 Clicks Score (PT) 8  -JR 8  -SP    Highest Level of Mobility Goal 3 --> Sit at edge of bed  -JR 3 --> Sit at edge of bed  -SP      Row Name 05/11/24 1057          Functional Assessment    Outcome Measure Options AM-PAC 6 Clicks Basic Mobility (PT)  -               User Key  (r) = Recorded By, (t) = Taken By, (c) = Cosigned By      Initials Name Provider Type    Michaela Chavira, PT Physical Therapist    Alla Barber, RN Registered Nurse                                 Physical Therapy Education       Title: PT OT SLP Therapies (In Progress)       Topic: Physical Therapy (In Progress)       Point: Mobility training (Done)       Learning Progress Summary             Patient Acceptance, E,TB, VU by  at 5/11/2024 1939    Comment: Role of PT and POC                         Point: Home exercise program (Not Started)       Learner Progress:  Not documented in this visit.              Point: Body mechanics (Not Started)       Learner Progress:  Not documented in  this visit.              Point: Precautions (Not Started)       Learner Progress:  Not documented in this visit.                              User Key       Initials Effective Dates Name Provider Type Discipline     08/22/23 -  Michaela Batista, PT Physical Therapist PT                  PT Recommendation and Plan  Planned Therapy Interventions (PT): strengthening, balance training, bed mobility training, transfer training, gait training, home exercise program, patient/family education  Plan of Care Reviewed With: patient  Progress: no change  Outcome Evaluation: Patient participated well in PT evaluation and demonstrated decreased overall mobility s/p left hip hemiarthropasty.  SANTIAGO precautions are reviewed and she requires frequent verbal cues to maintain precautions with mobility.  She requires moderate assistance to perform bed mobility.  She was able to perform sit to stand with moderate assistance, she became light headedand began to tremor and is returned to sitting.  She is eager to walk; however, it is explained that she is not able to walk until she can tolerate sitting and standing without getting light headed.  Patient is expected to benefit from additional PT while hospitalized and upon discharge to inpatient rehab.     Time Calculation:   PT Evaluation Complexity  History, PT Evaluation Complexity: 1-2 personal factors and/or comorbidities  Examination of Body Systems (PT Eval Complexity): total of 3 or more elements  Clinical Presentation (PT Evaluation Complexity): evolving  Clinical Decision Making (PT Evaluation Complexity): moderate complexity  Overall Complexity (PT Evaluation Complexity): moderate complexity     PT Charges       Row Name 05/11/24 1057             Time Calculation    Start Time 1057  -JR      PT Received On 05/11/24  -JR      PT Goal Re-Cert Due Date 05/21/24  -JR         Untimed Charges    PT Eval/Re-eval Minutes 70  -JR         Total Minutes    Untimed Charges Total Minutes 70  -JR        Total Minutes 70  -JR                User Key  (r) = Recorded By, (t) = Taken By, (c) = Cosigned By      Initials Name Provider Type    Michaela Chavira, PT Physical Therapist                  Therapy Charges for Today       Code Description Service Date Service Provider Modifiers Qty    [id number] HC-PT EVAL MOD COMPLEXITY 5 5/11/2024 Michaela Batista, PT  1            PT G-Codes  Outcome Measure Options: AM-PAC 6 Clicks Basic Mobility (PT)  AM-PAC 6 Clicks Score (PT): 8  PT Discharge Summary  Anticipated Discharge Disposition (PT): inpatient rehabilitation facility    Michaela Batista, YAMILE  5/11/2024

## 2024-05-11 NOTE — PROGRESS NOTES
Patient: Trang Maynard  Procedure(s):  HIP HEMIARTHROPLASTY  Anesthesia type: general with block    Patient location: [facility] Surgical Floor  Last vitals:   Vitals:    05/11/24 1200   BP: 98/54   Pulse: 86   Resp: 16   Temp: 97.6 °F (36.4 °C)   SpO2: 100%     Level of consciousness: awake, alert, and oriented    Post-anesthesia pain: adequate analgesia  Airway patency: patent  Respiratory: unassisted  Cardiovascular: stable and blood pressure at baseline  Hydration: euvolemic    Anesthetic complications: no

## 2024-05-12 VITALS
DIASTOLIC BLOOD PRESSURE: 77 MMHG | HEIGHT: 67 IN | OXYGEN SATURATION: 97 % | RESPIRATION RATE: 16 BRPM | WEIGHT: 128.75 LBS | HEART RATE: 100 BPM | TEMPERATURE: 98 F | SYSTOLIC BLOOD PRESSURE: 135 MMHG | BODY MASS INDEX: 20.21 KG/M2

## 2024-05-12 LAB
ALBUMIN SERPL-MCNC: 2.8 G/DL (ref 3.5–5.2)
ALBUMIN/GLOB SERPL: 1.2 G/DL
ALP SERPL-CCNC: 59 U/L (ref 39–117)
ALT SERPL W P-5'-P-CCNC: 16 U/L (ref 1–33)
ANION GAP SERPL CALCULATED.3IONS-SCNC: 10.7 MMOL/L (ref 5–15)
AST SERPL-CCNC: 41 U/L (ref 1–32)
BILIRUB SERPL-MCNC: 0.3 MG/DL (ref 0–1.2)
BUN SERPL-MCNC: 23 MG/DL (ref 8–23)
BUN/CREAT SERPL: 21.1 (ref 7–25)
CALCIUM SPEC-SCNC: 8.7 MG/DL (ref 8.6–10.5)
CHLORIDE SERPL-SCNC: 103 MMOL/L (ref 98–107)
CO2 SERPL-SCNC: 20.3 MMOL/L (ref 22–29)
CREAT SERPL-MCNC: 1.09 MG/DL (ref 0.57–1)
DEPRECATED RDW RBC AUTO: 49.3 FL (ref 37–54)
EGFRCR SERPLBLD CKD-EPI 2021: 54.4 ML/MIN/1.73
ERYTHROCYTE [DISTWIDTH] IN BLOOD BY AUTOMATED COUNT: 14.7 % (ref 12.3–15.4)
GLOBULIN UR ELPH-MCNC: 2.3 GM/DL
GLUCOSE SERPL-MCNC: 129 MG/DL (ref 65–99)
HCT VFR BLD AUTO: 21.7 % (ref 34–46.6)
HGB BLD-MCNC: 7.1 G/DL (ref 12–15.9)
MCH RBC QN AUTO: 30.1 PG (ref 26.6–33)
MCHC RBC AUTO-ENTMCNC: 32.7 G/DL (ref 31.5–35.7)
MCV RBC AUTO: 91.9 FL (ref 79–97)
PLATELET # BLD AUTO: 127 10*3/MM3 (ref 140–450)
PMV BLD AUTO: 10.3 FL (ref 6–12)
POTASSIUM SERPL-SCNC: 4.2 MMOL/L (ref 3.5–5.2)
PROT SERPL-MCNC: 5.1 G/DL (ref 6–8.5)
RBC # BLD AUTO: 2.36 10*6/MM3 (ref 3.77–5.28)
SODIUM SERPL-SCNC: 134 MMOL/L (ref 136–145)
WBC NRBC COR # BLD AUTO: 12.36 10*3/MM3 (ref 3.4–10.8)

## 2024-05-12 PROCEDURE — 80053 COMPREHEN METABOLIC PANEL: CPT | Performed by: ORTHOPAEDIC SURGERY

## 2024-05-12 PROCEDURE — 25810000003 SODIUM CHLORIDE 0.9 % SOLUTION: Performed by: ORTHOPAEDIC SURGERY

## 2024-05-12 PROCEDURE — 25010000002 MORPHINE PER 10 MG: Performed by: ORTHOPAEDIC SURGERY

## 2024-05-12 PROCEDURE — 97530 THERAPEUTIC ACTIVITIES: CPT

## 2024-05-12 PROCEDURE — 25010000002 ENOXAPARIN PER 10 MG: Performed by: ORTHOPAEDIC SURGERY

## 2024-05-12 PROCEDURE — 85027 COMPLETE CBC AUTOMATED: CPT | Performed by: ORTHOPAEDIC SURGERY

## 2024-05-12 PROCEDURE — 97116 GAIT TRAINING THERAPY: CPT

## 2024-05-12 RX ORDER — HYDROCODONE BITARTRATE AND ACETAMINOPHEN 5; 325 MG/1; MG/1
1 TABLET ORAL EVERY 6 HOURS PRN
Qty: 20 TABLET | Refills: 0 | Status: SHIPPED | OUTPATIENT
Start: 2024-05-12 | End: 2024-05-17

## 2024-05-12 RX ADMIN — MORPHINE SULFATE 2 MG: 2 INJECTION, SOLUTION INTRAMUSCULAR; INTRAVENOUS at 04:12

## 2024-05-12 RX ADMIN — PANTOPRAZOLE SODIUM 40 MG: 40 TABLET, DELAYED RELEASE ORAL at 06:15

## 2024-05-12 RX ADMIN — CLOPIDOGREL BISULFATE 75 MG: 75 TABLET ORAL at 09:08

## 2024-05-12 RX ADMIN — Medication 10 ML: at 09:09

## 2024-05-12 RX ADMIN — ATORVASTATIN CALCIUM 80 MG: 80 TABLET, FILM COATED ORAL at 09:09

## 2024-05-12 RX ADMIN — HYDROCODONE BITARTRATE AND ACETAMINOPHEN 1 TABLET: 5; 325 TABLET ORAL at 00:23

## 2024-05-12 RX ADMIN — SODIUM CHLORIDE 100 ML/HR: 9 INJECTION, SOLUTION INTRAVENOUS at 06:55

## 2024-05-12 RX ADMIN — ASPIRIN 81 MG: 81 TABLET, COATED ORAL at 09:09

## 2024-05-12 RX ADMIN — METOPROLOL TARTRATE 50 MG: 25 TABLET, FILM COATED ORAL at 09:08

## 2024-05-12 RX ADMIN — LISINOPRIL 40 MG: 10 TABLET ORAL at 09:08

## 2024-05-12 RX ADMIN — HYDROCODONE BITARTRATE AND ACETAMINOPHEN 1 TABLET: 5; 325 TABLET ORAL at 06:15

## 2024-05-12 RX ADMIN — ENOXAPARIN SODIUM 40 MG: 100 INJECTION SUBCUTANEOUS at 09:09

## 2024-05-12 RX ADMIN — LEVOTHYROXINE SODIUM 25 MCG: 50 TABLET ORAL at 06:16

## 2024-05-12 NOTE — THERAPY TREATMENT NOTE
Patient Name: Trang Maynard  : 1955    MRN: [id number]                              Today's Date: 2024       Admit Date: 2024    Visit Dx:     ICD-10-CM ICD-9-CM   1. Left displaced femoral neck fracture  S72.002A 820.8   2. Hip fracture  S72.009A 820.8   3. Closed fracture of neck of left femur, initial encounter  S72.002A 820.8     Patient Active Problem List   Diagnosis    Uterine fibroid    Anxiety    Dyslipidemia    Pill rolling tremors    Hyponatremia    Hypokalemia    Hypomagnesemia    Depression    Essential hypertension    Gastroparesis    Gastroesophageal reflux disease with esophagitis without hemorrhage    Irritable bowel syndrome with diarrhea    CAD (coronary artery disease)     Past Medical History:   Diagnosis Date    Anxiety     Arthritis     CAD (coronary artery disease)     stent     Chronic kidney disease     SEES DR. MIRANDA    Constipation     Depression     Diabetes mellitus     Diarrhea     Dysphagia     SPOUSE REPORTS VERY MILD.    Elevated cholesterol     Gastric ulcer     GERD (gastroesophageal reflux disease)     Heart disease     History of chest pain     SPOUSE REPORTS APPROXIMATLEY FEW MONTHS AGO AND THAT PATIENT HAD WORK UP WITH DR MAGANA.  STATES HAD NUCLEAR STRESS AND IT WAS WNL.      Hyperlipidemia     Hypertension     Hypokalemia     Impaired functional mobility, balance, gait, and endurance     Kidney stone     Low sodium levels     Psychosis     PVD (peripheral vascular disease)     Schizophrenia     SPOUSE REPORTED    Seizures 2016    ONLY HAD ONE EPISODE.  STATED SIDE EFFECTS OF MEDICATION    Sleep apnea     SPOUSE REPORTS PATIENT WAS NOT ABLE TO TOLERATE THE CPAP.      Thyroid condition     TIA (transient ischemic attack)     .  SPOUSE REPORTS NO RESIDUAL PROBLEMS.    Tremors of nervous system     Poss. r/t medications pt is on.    Uterine fibroid     Vitamin B12 deficiency      Past Surgical History:   Procedure Laterality Date     CARDIAC CATHETERIZATION      SPOUSE REPORTS 2005 AND 2007 (REPORTS A TOTAL OF 4 STENTS WERE PLACED)    CARDIAC SURGERY      SPOUSE REPORTS 2 STENTS PLACED IN 2005 AND 2 STENTS IN 2007    COLONOSCOPY N/A 5/31/2018    Procedure: COLONOSCOPY with cold biopsy polypectomies and biopsies;  Surgeon: Servando Rooney MD;  Location: [facility] ENDOSCOPY;  Service: Gastroenterology    CYST REMOVAL      SPOUSE REPORTS REMOVED FROM NECK    ENDOSCOPY N/A 9/28/2017    Procedure: ESOPHAGOGASTRODUODENOSCOPY WITH BIOPSIES;  Surgeon: Servando Rooney MD;  Location: [facility] ENDOSCOPY;  Service:     ENDOSCOPY N/A 1/25/2018    Procedure: ESOPHAGOGASTRODUODENOSCOPY with biopsies;  Surgeon: Servando Rooney MD;  Location: [facility] ENDOSCOPY;  Service:     ENDOSCOPY      ENDOSCOPY N/A 7/26/2018    Procedure: ESOPHAGOGASTRODUODENOSCOPY WITH BIOPSIES;  Surgeon: Servando Rooney MD;  Location: [facility] ENDOSCOPY;  Service: Gastroenterology    OTHER SURGICAL HISTORY      SPOUSE REPORTS PATIENT ATTEMPTED COLONOSCOPY BEFORE BUT PREP WAS NOT COMPLETE    TUBAL ABDOMINAL LIGATION        General Information       Row Name 05/12/24 1122          Physical Therapy Time and Intention    Document Type therapy note (daily note)  -     Mode of Treatment physical therapy  -       Row Name 05/12/24 1122          General Information    Patient Profile Reviewed yes  -               User Key  (r) = Recorded By, (t) = Taken By, (c) = Cosigned By      Initials Name Provider Type    JR Michaela Batista PT Physical Therapist                   Mobility       Row Name 05/12/24 1122          Bed Mobility    Bed Mobility supine-sit;sit-supine  -JR     Supine-Sit [place] (Bed Mobility) minimum assist (75% patient effort)  -JR     Sit-Supine [place] (Bed Mobility) minimum assist (75% patient effort)  -JR     Assistive Device (Bed Mobility) head of bed elevated;bed rails  -       Row Name 05/12/24 1122          Sit-Stand Transfer    Sit-Stand [place] (Transfers)  minimum assist (75% patient effort)  -     Assistive Device (Sit-Stand Transfers) walker, front-wheeled  -JR       Row Name 05/12/24 1122          Gait/Stairs (Locomotion)    [place] (Gait) minimum assist (75% patient effort)  -JR     Assistive Device (Gait) walker, front-wheeled  -JR     Patient was able to Ambulate yes  -JR     Distance in Feet (Gait) 30  -JR     Deviations/Abnormal Patterns (Gait) antalgic;base of support, narrow;stacy decreased;festinating/shuffling;stride length decreased  -JR     Bilateral Gait Deviations forward flexed posture;heel strike decreased  -JR     Left Sided Gait Deviations weight shift ability decreased  -JR       Row Name 05/12/24 1122          Mobility    Extremity Weight-bearing Status left lower extremity  -JR     Left Lower Extremity (Weight-bearing Status) weight-bearing as tolerated (WBAT)  -JR               User Key  (r) = Recorded By, (t) = Taken By, (c) = Cosigned By      Initials Name Provider Type    Michaela Chavira, PT Physical Therapist                   Obj/Interventions       Row Name 05/12/24 1122          Balance    Balance Assessment sitting static balance;sitting dynamic balance;sit to stand dynamic balance;standing static balance;standing dynamic balance  -JR     Static Sitting Balance supervision  -JR     Dynamic Sitting Balance contact guard  -JR     Position, Sitting Balance unsupported;sitting edge of bed  -JR     Sit to Stand Dynamic Balance minimal assist  -JR     Static Standing Balance contact guard  -JR     Dynamic Standing Balance minimal assist  -JR     Position/Device Used, Standing Balance walker, rolling  -JR               User Key  (r) = Recorded By, (t) = Taken By, (c) = Cosigned By      Initials Name Provider Type    Michaela Chavira PT Physical Therapist                   Goals/Plan    No documentation.                  Clinical Impression       Row Name 05/12/24 1122          Pain    Pretreatment Pain Rating 5/10  -JR      Posttreatment Pain Rating 4/10  -JR     Pain Location - Side/Orientation Left  -JR     Pain Location - hip  -JR     Pain Intervention(s) Repositioned;Ambulation/increased activity  -JR     Additional Documentation Pain Scale: Numbers Pre/Post-Treatment (Group)  -JR       Row Name 05/12/24 1122          Plan of Care Review    Plan of Care Reviewed With patient  -JR     Progress improving  -JR     Outcome Evaluation Patient participates well in PT treatment and demonstrates improving strength and balance.  She is able to perform bed mobility with no more than minimal assistance and verbal cues to maintain hip precautions.  She is able perform sit to stand transfers with minimal assistance and cues with RW.  She walked 30 feet with RW, minimal assistance and cues for walker placement and sequencing.  She is expected to benefit from additional PT service at home with home health care services.  Patient is being discharged home today.  -JR               User Key  (r) = Recorded By, (t) = Taken By, (c) = Cosigned By      Initials Name Provider Type    Michaela Chavira, PT Physical Therapist                   Outcome Measures       Row Name 05/12/24 1200 05/12/24 1122       How much help from another person do you currently need...    Turning from your back to your side while in flat bed without using bedrails? 2  -SP 3  -JR    Moving from lying on back to sitting on the side of a flat bed without bedrails? 2  -SP 3  -JR    Moving to and from a bed to a chair (including a wheelchair)? 1  -SP 3  -JR    Standing up from a chair using your arms (e.g., wheelchair, bedside chair)? 1  -SP 3  -JR    Climbing 3-5 steps with a railing? 1  -SP 3  -JR    To walk in hospital room? 1  -SP 3  -JR    AM-PAC 6 Clicks Score (PT) 8  -SP 18  -JR    Highest Level of Mobility Goal 3 --> Sit at edge of bed  -SP 6 --> Walk 10 steps or more  -JR      Row Name 05/12/24 0800 05/12/24 0600       How much help from another person do you currently  need...    Turning from your back to your side while in flat bed without using bedrails? 2  -SP 4  -AG    Moving from lying on back to sitting on the side of a flat bed without bedrails? 2  -SP 4  -AG    Moving to and from a bed to a chair (including a wheelchair)? 2  -SP 4  -AG    Standing up from a chair using your arms (e.g., wheelchair, bedside chair)? 2  -SP 4  -AG    Climbing 3-5 steps with a railing? 1  -SP 3  -AG    To walk in hospital room? 3  -SP 4  -AG    AM-PAC 6 Clicks Score (PT) 12  -SP 23  -AG    Highest Level of Mobility Goal 4 --> Transfer to chair/commode  -SP 7 --> Walk 25 feet or more  -AG      Row Name 05/12/24 0500          How much help from another person do you currently need...    Turning from your back to your side while in flat bed without using bedrails? 2  -AG     Moving from lying on back to sitting on the side of a flat bed without bedrails? 2  -AG     Moving to and from a bed to a chair (including a wheelchair)? 1  -AG     Standing up from a chair using your arms (e.g., wheelchair, bedside chair)? 1  -AG     Climbing 3-5 steps with a railing? 1  -AG     To walk in hospital room? 1  -AG     AM-PAC 6 Clicks Score (PT) 8  -AG     Highest Level of Mobility Goal 3 --> Sit at edge of bed  -AG       Row Name 05/12/24 1122          Functional Assessment    Outcome Measure Options AM-PAC 6 Clicks Basic Mobility (PT)  -               User Key  (r) = Recorded By, (t) = Taken By, (c) = Cosigned By      Initials Name Provider Type    Michaela Chavira, PT Physical Therapist    Alla Barber, RN Registered Nurse    Aarti Pedro, RN Registered Nurse                                 Physical Therapy Education       Title: PT OT SLP Therapies (Resolved)       Topic: Physical Therapy (Resolved)       Point: Mobility training (Resolved)       Learning Progress Summary             Patient Acceptance, E,TB, VU by  at 5/11/2024 0559    Comment: Role of PT and POC                          Point: Home exercise program (Resolved)       Learner Progress:  Not documented in this visit.              Point: Body mechanics (Resolved)       Learner Progress:  Not documented in this visit.              Point: Precautions (Resolved)       Learner Progress:  Not documented in this visit.                              User Key       Initials Effective Dates Name Provider Type Discipline     08/22/23 -  Michaela Batista, PT Physical Therapist PT                  PT Recommendation and Plan  Planned Therapy Interventions (PT): strengthening, balance training, bed mobility training, transfer training, gait training, home exercise program, patient/family education  Plan of Care Reviewed With: patient  Progress: improving  Outcome Evaluation: Patient participates well in PT treatment and demonstrates improving strength and balance.  She is able to perform bed mobility with no more than minimal assistance and verbal cues to maintain hip precautions.  She is able perform sit to stand transfers with minimal assistance and cues with RW.  She walked 30 feet with RW, minimal assistance and cues for walker placement and sequencing.  She is expected to benefit from additional PT service at home with home health care services.  Patient is being discharged home today.     Time Calculation:   PT Evaluation Complexity  History, PT Evaluation Complexity: 1-2 personal factors and/or comorbidities  Examination of Body Systems (PT Eval Complexity): total of 3 or more elements  Clinical Presentation (PT Evaluation Complexity): evolving  Clinical Decision Making (PT Evaluation Complexity): moderate complexity  Overall Complexity (PT Evaluation Complexity): moderate complexity     PT Charges       Row Name 05/12/24 1122             Time Calculation    Start Time 1122  -      Stop Time 1155  -      Time Calculation (min) 33 min  -      PT Received On 05/12/24  -      PT Goal Re-Cert Due Date 05/21/24  -         Time Calculation-  PT    Total Timed Code Minutes- PT 33 minute(s)  -JR         Timed Charges    29373 - Gait Training Minutes  18  -JR      25939 - PT Therapeutic Activity Minutes 15  -JR         Total Minutes    Timed Charges Total Minutes 33  -JR       Total Minutes 33  -JR                User Key  (r) = Recorded By, (t) = Taken By, (c) = Cosigned By      Initials Name Provider Type    Michaela Chavira, PT Physical Therapist                  Therapy Charges for Today       Code Description Service Date Service Provider Modifiers Qty    22154261917 HC-PT EVAL MOD COMPLEXITY 5 5/11/2024 Michaela Batista, PT  1    75237159953 HC GAIT TRAINING EA 15 MIN 5/12/2024 Michaela Batista, PT GP 1    [id number]  PT THERAPEUTIC ACT EA 15 MIN 5/12/2024 Michaela Batista, PT GP 1            PT G-Codes  Outcome Measure Options: AM-PAC 6 Clicks Basic Mobility (PT)  AM-PAC 6 Clicks Score (PT): 8  PT Discharge Summary  Anticipated Discharge Disposition (PT): inpatient rehabilitation facility    Michaela Batista, PT  5/12/2024

## 2024-05-12 NOTE — PLAN OF CARE
Problem: Adult Inpatient Plan of Care  Goal: Plan of Care Review  Outcome: Ongoing, Progressing  Goal: Patient-Specific Goal (Individualized)  Outcome: Ongoing, Progressing  Goal: Absence of Hospital-Acquired Illness or Injury  Outcome: Ongoing, Progressing  Intervention: Identify and Manage Fall Risk  Recent Flowsheet Documentation  Taken 5/12/2024 0600 by Aarti Sandy RN  Safety Promotion/Fall Prevention: safety round/check completed  Taken 5/12/2024 0500 by Aarti Sandy RN  Safety Promotion/Fall Prevention: safety round/check completed  Taken 5/12/2024 0400 by Aarti Sandy RN  Safety Promotion/Fall Prevention: safety round/check completed  Taken 5/12/2024 0300 by Aarti Sandy RN  Safety Promotion/Fall Prevention: safety round/check completed  Taken 5/12/2024 0200 by Aarti Sandy RN  Safety Promotion/Fall Prevention: safety round/check completed  Taken 5/12/2024 0100 by Aarti Sandy RN  Safety Promotion/Fall Prevention: safety round/check completed  Taken 5/12/2024 0000 by Aarti Sandy RN  Safety Promotion/Fall Prevention: safety round/check completed  Taken 5/11/2024 2300 by Aarti Sandy RN  Safety Promotion/Fall Prevention: safety round/check completed  Taken 5/11/2024 2200 by Aarti Sandy RN  Safety Promotion/Fall Prevention: safety round/check completed  Taken 5/11/2024 2100 by Aarti Sandy RN  Safety Promotion/Fall Prevention: safety round/check completed  Taken 5/11/2024 2000 by Aarti Sandy RN  Safety Promotion/Fall Prevention: safety round/check completed  Taken 5/11/2024 1900 by Aarti Sandy RN  Safety Promotion/Fall Prevention: safety round/check completed  Intervention: Prevent Skin Injury  Recent Flowsheet Documentation  Taken 5/12/2024 0600 by Aarti Sandy RN  Body Position: weight shifting  Taken 5/12/2024 0500 by Aarti Sandy RN  Body Position:   weight shifting   right  Taken 5/12/2024 0400 by Aarti Sandy RN  Body Position:   weight shifting    right  Taken 5/12/2024 0300 by Aarti Sandy RN  Body Position:   weight shifting   supine  Taken 5/12/2024 0200 by Aarti Sandy RN  Body Position:   weight shifting   supine  Taken 5/12/2024 0100 by Aarti Sandy RN  Body Position:   weight shifting   supine  Taken 5/12/2024 0000 by Aarti Sandy RN  Body Position: (semifowler)   weight shifting   other (see comments)  Taken 5/11/2024 2300 by Aarti Sandy RN  Body Position:   weight shifting   other (see comments)  Taken 5/11/2024 2200 by Aarti Sandy RN  Body Position:   weight shifting   other (see comments)  Taken 5/11/2024 2100 by Aarti Sandy RN  Body Position:   weight shifting   other (see comments)  Taken 5/11/2024 2000 by Aarti Sandy RN  Body Position:   weight shifting   other (see comments)  Taken 5/11/2024 1900 by Aarti Sandy RN  Body Position: (semifowler)   weight shifting   other (see comments)  Intervention: Prevent and Manage VTE (Venous Thromboembolism) Risk  Recent Flowsheet Documentation  Taken 5/12/2024 0600 by Aarti Sandy RN  Activity Management: sitting, edge of bed  Taken 5/12/2024 0500 by Aarti Sandy RN  Activity Management: bedrest  Taken 5/12/2024 0400 by Aarti Sandy RN  Activity Management: bedrest  Taken 5/12/2024 0300 by Aarti Sandy RN  Activity Management: bedrest  Taken 5/12/2024 0200 by Aarti Sandy RN  Activity Management: bedrest  Taken 5/12/2024 0100 by Aarti Sandy RN  Activity Management: bedrest  Taken 5/12/2024 0000 by Aarti Sandy RN  Activity Management: bedrest  Taken 5/11/2024 2300 by Aarti Sandy RN  Activity Management: bedrest  Taken 5/11/2024 2200 by Aarti Sandy RN  Activity Management: bedrest  Taken 5/11/2024 2100 by Aarti Sandy RN  Activity Management: bedrest  Taken 5/11/2024 2000 by Kimball, Aarti, RN  Activity Management: bedrest  VTE Prevention/Management:   bilateral   sequential compression devices off  Range of Motion: active ROM (range of motion)  encouraged  Taken 5/11/2024 1900 by Aarti Sandy RN  Activity Management: bedrest  Intervention: Prevent Infection  Recent Flowsheet Documentation  Taken 5/12/2024 0600 by Aarti Sandy RN  Infection Prevention: environmental surveillance performed  Taken 5/12/2024 0500 by Aarti Sandy RN  Infection Prevention: environmental surveillance performed  Taken 5/12/2024 0400 by Aarti Sandy RN  Infection Prevention: environmental surveillance performed  Taken 5/12/2024 0300 by Aarti Sandy RN  Infection Prevention: environmental surveillance performed  Taken 5/12/2024 0200 by Aarti Sandy RN  Infection Prevention: environmental surveillance performed  Taken 5/12/2024 0100 by Aarti Sandy RN  Infection Prevention: environmental surveillance performed  Taken 5/12/2024 0000 by Aarti Sandy RN  Infection Prevention: environmental surveillance performed  Taken 5/11/2024 2300 by Aarti Sandy RN  Infection Prevention: environmental surveillance performed  Taken 5/11/2024 2200 by Aarti Sandy RN  Infection Prevention: environmental surveillance performed  Taken 5/11/2024 2100 by Aarti Sandy RN  Infection Prevention: environmental surveillance performed  Taken 5/11/2024 2000 by Aarti Sandy RN  Infection Prevention: environmental surveillance performed  Taken 5/11/2024 1900 by Aarti Sandy RN  Infection Prevention: environmental surveillance performed  Goal: Optimal Comfort and Wellbeing  Outcome: Ongoing, Progressing  Intervention: Provide Person-Centered Care  Recent Flowsheet Documentation  Taken 5/11/2024 2000 by Aarti Sandy RN  Trust Relationship/Rapport: care explained  Goal: Readiness for Transition of Care  Outcome: Ongoing, Progressing     Problem: Skin Injury Risk Increased  Goal: Skin Health and Integrity  Outcome: Ongoing, Progressing  Intervention: Optimize Skin Protection  Recent Flowsheet Documentation  Taken 5/12/2024 0600 by Aarti Sandy RN  Head of Bed ([facility])  Positioning: HOB elevated  Taken 5/12/2024 0500 by Aarti Sandy RN  Head of Bed (HOB) Positioning: HOB elevated  Taken 5/12/2024 0400 by Aarti Sandy RN  Head of Bed (HOB) Positioning: HOB elevated  Taken 5/12/2024 0300 by Aarti Sandy RN  Head of Bed (HOB) Positioning: HOB elevated  Taken 5/12/2024 0200 by Aarti Sandy RN  Head of Bed (HOB) Positioning: HOB elevated  Taken 5/12/2024 0100 by Aarti Sandy RN  Head of Bed (HOB) Positioning: HOB elevated  Taken 5/12/2024 0000 by Aarti Sandy RN  Head of Bed (HOB) Positioning: HOB elevated  Taken 5/11/2024 2300 by Aarti Sandy RN  Head of Bed (HOB) Positioning: HOB elevated  Taken 5/11/2024 2200 by Aarti Sandy RN  Head of Bed (HOB) Positioning: HOB elevated  Taken 5/11/2024 2100 by Aarti Sandy RN  Head of Bed (HOB) Positioning: HOB elevated  Taken 5/11/2024 2000 by Aarti Sandy RN  Head of Bed (HOB) Positioning: HOB elevated  Taken 5/11/2024 1900 by Aarti Sandy RN  Head of Bed (HOB) Positioning: HOB elevated     Problem: Diabetes Comorbidity  Goal: Blood Glucose Level Within Targeted Range  Outcome: Ongoing, Progressing     Problem: Hypertension Comorbidity  Goal: Blood Pressure in Desired Range  Outcome: Ongoing, Progressing  Intervention: Maintain Blood Pressure Management  Recent Flowsheet Documentation  Taken 5/12/2024 0600 by Aarti Sandy RN  Medication Review/Management: medications reviewed  Taken 5/12/2024 0500 by Aarti Sandy RN  Medication Review/Management: medications reviewed  Taken 5/12/2024 0400 by Aarti Sandy RN  Medication Review/Management: medications reviewed  Taken 5/12/2024 0300 by Aarti Sandy RN  Medication Review/Management: medications reviewed  Taken 5/12/2024 0200 by Aarti Sandy RN  Medication Review/Management: medications reviewed  Taken 5/12/2024 0100 by Aarti Sandy RN  Medication Review/Management: medications reviewed  Taken 5/12/2024 0000 by Aarti Sandy RN  Medication  Review/Management: medications reviewed  Taken 5/11/2024 2300 by Aarti Sandy RN  Medication Review/Management: medications reviewed  Taken 5/11/2024 2200 by Aarti Sandy RN  Medication Review/Management: medications reviewed  Taken 5/11/2024 2100 by Aarti Sandy RN  Medication Review/Management: medications reviewed  Taken 5/11/2024 2000 by Aarti Sandy RN  Medication Review/Management: medications reviewed  Taken 5/11/2024 1900 by Aarti Sandy RN  Medication Review/Management: medications reviewed     Problem: Fall Injury Risk  Goal: Absence of Fall and Fall-Related Injury  Outcome: Ongoing, Progressing  Intervention: Identify and Manage Contributors  Recent Flowsheet Documentation  Taken 5/12/2024 0600 by Aarti Sandy RN  Medication Review/Management: medications reviewed  Taken 5/12/2024 0500 by Aarti Sandy RN  Medication Review/Management: medications reviewed  Taken 5/12/2024 0400 by Aarti Sandy RN  Medication Review/Management: medications reviewed  Taken 5/12/2024 0300 by Aarti Sandy RN  Medication Review/Management: medications reviewed  Taken 5/12/2024 0200 by Aarti Sandy RN  Medication Review/Management: medications reviewed  Taken 5/12/2024 0100 by Aarti Sandy RN  Medication Review/Management: medications reviewed  Taken 5/12/2024 0000 by Aarti Sandy RN  Medication Review/Management: medications reviewed  Taken 5/11/2024 2300 by Aarti Sandy RN  Medication Review/Management: medications reviewed  Taken 5/11/2024 2200 by Aarti Sandy RN  Medication Review/Management: medications reviewed  Taken 5/11/2024 2100 by Aarti Sandy RN  Medication Review/Management: medications reviewed  Taken 5/11/2024 2000 by Aarti Sandy RN  Medication Review/Management: medications reviewed  Taken 5/11/2024 1900 by Aarti Sandy RN  Medication Review/Management: medications reviewed  Intervention: Promote Injury-Free Environment  Recent Flowsheet Documentation  Taken  5/12/2024 0600 by Aarti Sandy RN  Safety Promotion/Fall Prevention: safety round/check completed  Taken 5/12/2024 0500 by Aarti Sandy RN  Safety Promotion/Fall Prevention: safety round/check completed  Taken 5/12/2024 0400 by Aarti Sandy RN  Safety Promotion/Fall Prevention: safety round/check completed  Taken 5/12/2024 0300 by Aarti Sandy RN  Safety Promotion/Fall Prevention: safety round/check completed  Taken 5/12/2024 0200 by Aarti Sandy RN  Safety Promotion/Fall Prevention: safety round/check completed  Taken 5/12/2024 0100 by Aarti Sandy RN  Safety Promotion/Fall Prevention: safety round/check completed  Taken 5/12/2024 0000 by Aarti Sandy RN  Safety Promotion/Fall Prevention: safety round/check completed  Taken 5/11/2024 2300 by Aarti Sandy RN  Safety Promotion/Fall Prevention: safety round/check completed  Taken 5/11/2024 2200 by Aarti Sandy RN  Safety Promotion/Fall Prevention: safety round/check completed  Taken 5/11/2024 2100 by Aarti Sandy RN  Safety Promotion/Fall Prevention: safety round/check completed  Taken 5/11/2024 2000 by Aarti Sandy RN  Safety Promotion/Fall Prevention: safety round/check completed  Taken 5/11/2024 1900 by Aarti Sandy RN  Safety Promotion/Fall Prevention: safety round/check completed   Goal Outcome Evaluation:

## 2024-05-12 NOTE — PLAN OF CARE
Goal Outcome Evaluation:  Plan of Care Reviewed With: patient        Progress: improving  Outcome Evaluation: Patient participates well in PT treatment and demonstrates improving strength and balance.  She is able to perform bed mobility with no more than minimal assistance and verbal cues to maintain hip precautions.  She is able perform sit to stand transfers with minimal assistance and cues with RW.  She walked 30 feet with RW, minimal assistance and cues for walker placement and sequencing.  She is expected to benefit from additional PT service at home with home health care services.  Patient is being discharged home today.      Anticipated Discharge Disposition (PT): inpatient rehabilitation facility                 PT gave written instructions on SANTIAGO precautions to RNAlla, to give to the patient's spouse.  A second copy was given to be placed into her chart

## 2024-05-12 NOTE — PLAN OF CARE
Goal Outcome Evaluation:  Plan of Care Reviewed With: patient        Progress: improving  Outcome Evaluation: VSS, ox4, RA, spouse at bedside, education given to both pt and spouse on hip precautions. Pt up with walker. Pt denied sob and pain. Pt has no concerns at this time. Pt ready for safe discharge.

## 2024-05-12 NOTE — DISCHARGE SUMMARY
[facility]   INTERNAL MEDICINE DISCHARGE SUMMARY      Name:  Trang Maynard   Age:  69 y.o.  Sex:  female  :  1955  MRN:  [id number]   Visit Number:  [id number]  Primary Care Physician:  Rod Vance MD  Date of Discharge:  2024  Admission Date:  2024      Discharge Diagnosis:         Anxiety    Dyslipidemia    Pill rolling tremors    Depression    Essential hypertension    Gastroesophageal reflux disease with esophagitis without hemorrhage    CAD (coronary artery disease)          Consults:     Consults       Date and Time Order Name Status Description    2024  7:37 PM Inpatient Cardiology Consult Completed     2024  7:37 PM Inpatient Orthopedic Surgery Consult Completed             Procedures Performed:      Procedure(s):  HIP HEMIARTHROPLASTY           Hospital Course:   The patient was admitted on 2024  Please see H&P for details on admission HPI and ROS.  69-year-old patient with past medical history of coronary artery disease with recent cath, hypertension, depression, GERD, tremors, anxiety disorder, who comes into the ER s/p fall  She fell accidentally could not get up and EMS was called.  She was found to have left femoral fracture and Dr. Hong was consulted who recommended admission for surgery and being the primary I was contacted for her to be admitted.     At present upon evaluation she denies any chest pain nausea vomiting still is having severe pain unable to move her leg.  She is very nervous anxious and scared.  She also has been having tremors which has been from before.  She states that she did have a heart cath and they found distal ostial blockage which could not be stented and has been on medical management.     After admission she had medical and cardiac clearance done and patient had surgery.  Surgery went fine and she is postop still having some pain     Patient seen today on .  Patient is postop day 2.  She did walk with the  therapist 30 feet and is a stable to be discharged home and arrangements will be done accordingly.  She has drop in her hemoglobin postop it is 7.1 and will be closely followed up and will repeated labs to be done next week and follow-up as outpatient.  Orthopedic discharge instructions will be as per Dr. Hong and I will see her back in the office in 10 days.  Follow-up with Dr. Hong as per his instructions.  She will be discharged home with home physical therapy  She will be given hydrocodone as needed for severe pain and Tylenol for mild-to-moderate pain    Vital Signs:     Temp:  [97.8 °F (36.6 °C)-98.4 °F (36.9 °C)] 98.2 °F (36.8 °C)  Heart Rate:  [] 103  Resp:  [16] 16  BP: ()/(48-83) 136/80    Physical Exam:     General Appearance:    Alert and cooperative, not in any acute distress.   Head:    Atraumatic and normocephalic, without obvious abnormality.   Eyes:            PERRLA,  No pallor. Extraocular movements are within normal limits.   Neck:   Supple,  No lymph glands, no bruit   Lungs:     Chest shape is normal. Breath sounds heard bilaterally equally.  No crackles or wheezing.     Heart:    Normal S1 and S2, no murmur,  No JVD   Abdomen:     Normal bowel sounds, no masses, no organomegaly. Soft     nontender, no guarding, no rebound tenderness   Extremities:   Moves all extremities well, no edema, no cyanosis, postop dressing is clean   Skin:   No  bruising or rash.   Neurologic:   Grossly nonfocal and moves all extremities.        Pertinent Lab Results:     Results from last 7 days   Lab Units 05/12/24  0612 05/11/24  0610 05/10/24  0929   SODIUM mmol/L 134* 131* 130*   POTASSIUM mmol/L 4.2 4.8 4.3   CHLORIDE mmol/L 103 98 94*   CO2 mmol/L 20.3* 21.9* 24.7   BUN mg/dL 23 17 12   CREATININE mg/dL 1.09* 0.91 1.01*   CALCIUM mg/dL 8.7 9.1 9.5   BILIRUBIN mg/dL 0.3 0.5 0.6   ALK PHOS U/L 59 56 82   ALT (SGPT) U/L 16 12 15   AST (SGOT) U/L 41* 21 23   GLUCOSE mg/dL 129* 131* 111*     Results from  "last 7 days   Lab Units 05/12/24  0612 05/11/24  0610 05/10/24  0929   WBC 10*3/mm3 12.36* 12.76* 11.14*   HEMOGLOBIN g/dL 7.1* 9.3* 12.3   HEMATOCRIT % 21.7* 28.9* 37.3   PLATELETS 10*3/mm3 127* 134* 145     Results from last 7 days   Lab Units 05/08/24  1646   INR  1.02     No results found for: \"BLOODCX\", \"URINECX\", \"WOUNDCX\", \"MRSACX\"    Pertinent Radiology Results:    Imaging Results (Most Recent)       Procedure Component Value Units Date/Time    XR Femur 2 View Left [[id number]] Collected: 05/08/24 1642     Updated: 05/08/24 1701    Narrative:      PROCEDURE: XR FEMUR 2 VW LEFT-     HISTORY: fall, pain, deformity     FINDINGS:  Two views show, fracture of the left femoral neck.  There is  moderate displacement.     The joint spaces appear normal.          Impression:      Left femoral neck fracture as above.           This report was signed and finalized on 5/8/2024 4:59 PM by Jeremi Rodriguez MD.       XR Hip With or Without Pelvis 2 - 3 View Left [[id number]] Collected: 05/08/24 1644     Updated: 05/08/24 1647    Narrative:      PROCEDURE: XR HIP W OR WO PELVIS 2-3 VIEW LEFT-     HISTORY: Fall, pain, deformity     FINDINGS:  Two views show fracture of the left femoral neck.  There is  moderate displacement. It is difficult to assess whether the fracture  subcapital or mid cervical.     The joint spaces appear normal.          Impression:      Moderately displaced femoral neck fracture as above.           This report was signed and finalized on 5/8/2024 4:45 PM by Jeremi Rodriguez MD.       XR Chest 1 View [[id number]] Collected: 05/08/24 1642     Updated: 05/08/24 1645    Narrative:      PROCEDURE: XR CHEST 1 VW-     HISTORY: fall, pain     COMPARISON: 4/26/2017     FINDINGS:  Portable view of the chest demonstrates the lungs to be  grossly clear. There is no evidence of effusion, pneumothorax or other  significant pleural disease. Patient is status post CABG. No obvious rib  fracture is present.     The heart " size is normal.       Impression:      Unremarkable portable chest.            This report was signed and finalized on 5/8/2024 4:42 PM by Jeremi Rodriguez MD.                       Discharge Disposition:      Home-Health Care [facility] [place]    Discharge Medication:         Discharge Medications        New Medications        Instructions Start Date   HYDROcodone-acetaminophen 5-325 MG per tablet  Commonly known as: NORCO   1 tablet, Oral, Every 6 Hours PRN             Continue These Medications        Instructions Start Date   aspirin 81 MG EC tablet   81 mg, Oral, Daily      atorvastatin 80 MG tablet  Commonly known as: LIPITOR   80 mg, Oral, Daily      benztropine 0.5 MG tablet  Commonly known as: COGENTIN   0.5 mg, Oral, Daily      clopidogrel 75 MG tablet  Commonly known as: PLAVIX   75 mg, Oral, Daily      levothyroxine 25 MCG tablet  Commonly known as: SYNTHROID, LEVOTHROID   25 mcg, Oral, Daily      lisinopril 20 MG tablet  Commonly known as: PRINIVIL,ZESTRIL   20 mg, Oral, Daily      metoprolol tartrate 50 MG tablet  Commonly known as: LOPRESSOR   1 tablet, Oral, Every 12 Hours Scheduled      omeprazole 40 MG capsule  Commonly known as: priLOSEC   40 mg, Oral, Daily      Paliperidone Palmitate 546 MG/1.75ML suspension IM injection  Commonly known as: INVEGA TRINZA   546 mg, Intramuscular, Every 3 Months             Stop These Medications      dicyclomine 20 MG tablet  Commonly known as: BENTYL     metFORMIN 500 MG tablet  Commonly known as: GLUCOPHAGE     ondansetron ODT 4 MG disintegrating tablet  Commonly known as: ZOFRAN-ODT     pantoprazole 40 MG EC tablet  Commonly known as: PROTONIX              Discharge Diet:             Follow-up Appointments:        I will see the patient in the office in 8 to 10 days  Follow-up with Dr. Hong as per his instructions    Test Results Pending at Discharge:      Pending Labs       Order Current Status    TISSUE EXAM, [facility] ([facility]) Collected (05/10/24 8089)                Rod Vance MD  05/12/24  13:21 EDT    Time:  Discharge 38 min    Please note that portions of this note were completed with a voice recognition program.

## 2024-05-13 ENCOUNTER — READMISSION MANAGEMENT (OUTPATIENT)
Dept: CALL CENTER | Facility: HOSPITAL | Age: 69
End: 2024-05-13
Payer: MEDICARE

## 2024-05-13 NOTE — PAYOR COMM NOTE
"To:  [facility]  From: Lisseth Gomez RN  Phone: [phone]  Fax: [phone]  NPI: [id number]  TIN: [id number]  Member ID: [id number]   MRN: [id number]    Trang Maynard (69 y.o. Female)       Date of Birth   1955    Social Security Number       Address   [address] [address]    Home Phone   [phone]    MRN   [id number]       Catholic   None    Marital Status                               Admission Date   24    Admission Type   Emergency    Admitting Provider   Rod Vance MD    Attending Provider       Department, Room/Bed   [facility] TELEMETRY SDS OVERFLOW, T04       Discharge Date   2024    Discharge Disposition   Home-Health Care [facility] [place]    Discharge Destination                                 Attending Provider: (none)   Allergies: Penicillins    Isolation: None   Infection: None   Code Status: Prior    Ht: 170.2 cm (67\")   Wt: 58.4 kg (128 lb 12 oz)    Admission Cmt: None   Principal Problem: Femoral neck fracture [S72.009A]                   Active Insurance as of 2024       Primary Coverage       Payor Plan Insurance Group Employer/Plan Group    [facility] MEDICARE REPLACEMENT [facility] MED ADV SNP HMO [id number]       Payor Plan Address Payor Plan Phone Number Payor Plan Fax Number Effective Dates    [address] [address]   2023 - None Entered    [facility] [id number]         Subscriber Name Subscriber Birth Date Member ID       TRANG MAYNARD 1955 [id number]                     Emergency Contacts        (Rel.) Home Phone Work Phone Mobile Phone    Seymour Maynard (Spouse) [phone] -- --    Mojgan Whitmore (Daughter) -- -- [phone]                 Discharge Summary        Rod Vacne MD at 24 1321              [facility]   INTERNAL MEDICINE DISCHARGE SUMMARY      Name:  Trang Maynard   Age:  69 y.o.  Sex:  female  :  1955  MRN:  [id number]   Visit " Number:  [id number]  Primary Care Physician:  Rod Vance MD  Date of Discharge:  5/12/2024  Admission Date:  5/8/2024      Discharge Diagnosis:         Anxiety    Dyslipidemia    Pill rolling tremors    Depression    Essential hypertension    Gastroesophageal reflux disease with esophagitis without hemorrhage    CAD (coronary artery disease)          Consults:     Consults       Date and Time Order Name Status Description    5/8/2024  7:37 PM Inpatient Cardiology Consult Completed     5/8/2024  7:37 PM Inpatient Orthopedic Surgery Consult Completed             Procedures Performed:      Procedure(s):  HIP HEMIARTHROPLASTY           Hospital Course:   The patient was admitted on 5/8/2024  Please see H&P for details on admission HPI and ROS.  69-year-old patient with past medical history of coronary artery disease with recent cath, hypertension, depression, GERD, tremors, anxiety disorder, who comes into the ER s/p fall  She fell accidentally could not get up and EMS was called.  She was found to have left femoral fracture and Dr. Hong was consulted who recommended admission for surgery and being the primary I was contacted for her to be admitted.     At present upon evaluation she denies any chest pain nausea vomiting still is having severe pain unable to move her leg.  She is very nervous anxious and scared.  She also has been having tremors which has been from before.  She states that she did have a heart cath and they found distal ostial blockage which could not be stented and has been on medical management.     After admission she had medical and cardiac clearance done and patient had surgery.  Surgery went fine and she is postop still having some pain     Patient seen today on May12.  Patient is postop day 2.  She did walk with the therapist 30 feet and is a stable to be discharged home and arrangements will be done accordingly.  She has drop in her hemoglobin postop it is 7.1 and will be closely followed  up and will repeated labs to be done next week and follow-up as outpatient.  Orthopedic discharge instructions will be as per Dr. Hong and I will see her back in the office in 10 days.  Follow-up with Dr. Hong as per his instructions.  She will be discharged home with home physical therapy  She will be given hydrocodone as needed for severe pain and Tylenol for mild-to-moderate pain    Vital Signs:     Temp:  [97.8 °F (36.6 °C)-98.4 °F (36.9 °C)] 98.2 °F (36.8 °C)  Heart Rate:  [] 103  Resp:  [16] 16  BP: ()/(48-83) 136/80    Physical Exam:     General Appearance:    Alert and cooperative, not in any acute distress.   Head:    Atraumatic and normocephalic, without obvious abnormality.   Eyes:            PERRLA,  No pallor. Extraocular movements are within normal limits.   Neck:   Supple,  No lymph glands, no bruit   Lungs:     Chest shape is normal. Breath sounds heard bilaterally equally.  No crackles or wheezing.     Heart:    Normal S1 and S2, no murmur,  No JVD   Abdomen:     Normal bowel sounds, no masses, no organomegaly. Soft     nontender, no guarding, no rebound tenderness   Extremities:   Moves all extremities well, no edema, no cyanosis, postop dressing is clean   Skin:   No  bruising or rash.   Neurologic:   Grossly nonfocal and moves all extremities.        Pertinent Lab Results:     Results from last 7 days   Lab Units 05/12/24  0612 05/11/24  0610 05/10/24  0929   SODIUM mmol/L 134* 131* 130*   POTASSIUM mmol/L 4.2 4.8 4.3   CHLORIDE mmol/L 103 98 94*   CO2 mmol/L 20.3* 21.9* 24.7   BUN mg/dL 23 17 12   CREATININE mg/dL 1.09* 0.91 1.01*   CALCIUM mg/dL 8.7 9.1 9.5   BILIRUBIN mg/dL 0.3 0.5 0.6   ALK PHOS U/L 59 56 82   ALT (SGPT) U/L 16 12 15   AST (SGOT) U/L 41* 21 23   GLUCOSE mg/dL 129* 131* 111*     Results from last 7 days   Lab Units 05/12/24  0612 05/11/24  0610 05/10/24  0929   WBC 10*3/mm3 12.36* 12.76* 11.14*   HEMOGLOBIN g/dL 7.1* 9.3* 12.3   HEMATOCRIT % 21.7* 28.9* 37.3  "  PLATELETS 10*3/mm3 127* 134* 145     Results from last 7 days   Lab Units 05/08/24 1646   INR  1.02     No results found for: \"BLOODCX\", \"URINECX\", \"WOUNDCX\", \"MRSACX\"    Pertinent Radiology Results:    Imaging Results (Most Recent)       Procedure Component Value Units Date/Time    XR Femur 2 View Left [[id number]] Collected: 05/08/24 1642     Updated: 05/08/24 1701    Narrative:      PROCEDURE: XR FEMUR 2 VW LEFT-     HISTORY: fall, pain, deformity     FINDINGS:  Two views show, fracture of the left femoral neck.  There is  moderate displacement.     The joint spaces appear normal.          Impression:      Left femoral neck fracture as above.           This report was signed and finalized on 5/8/2024 4:59 PM by Jeremi Rodriguez MD.       XR Hip With or Without Pelvis 2 - 3 View Left [[id number]] Collected: 05/08/24 1644     Updated: 05/08/24 1647    Narrative:      PROCEDURE: XR HIP W OR WO PELVIS 2-3 VIEW LEFT-     HISTORY: Fall, pain, deformity     FINDINGS:  Two views show fracture of the left femoral neck.  There is  moderate displacement. It is difficult to assess whether the fracture  subcapital or mid cervical.     The joint spaces appear normal.          Impression:      Moderately displaced femoral neck fracture as above.           This report was signed and finalized on 5/8/2024 4:45 PM by Jeremi Rodriguez MD.       XR Chest 1 View [[id number]] Collected: 05/08/24 1642     Updated: 05/08/24 1645    Narrative:      PROCEDURE: XR CHEST 1 VW-     HISTORY: fall, pain     COMPARISON: 4/26/2017     FINDINGS:  Portable view of the chest demonstrates the lungs to be  grossly clear. There is no evidence of effusion, pneumothorax or other  significant pleural disease. Patient is status post CABG. No obvious rib  fracture is present.     The heart size is normal.       Impression:      Unremarkable portable chest.            This report was signed and finalized on 5/8/2024 4:42 PM by Jeremi Rodriguez MD.         "               Discharge Disposition:      [place]-Health [place] [facility] [place]    Discharge Medication:         Discharge Medications        New Medications        Instructions Start Date   HYDROcodone-acetaminophen 5-325 MG per tablet  Commonly known as: NORCO   1 tablet, Oral, Every 6 Hours PRN             Continue These Medications        Instructions Start Date   aspirin 81 MG EC tablet   81 mg, Oral, Daily      atorvastatin 80 MG tablet  Commonly known as: LIPITOR   80 mg, Oral, Daily      benztropine 0.5 MG tablet  Commonly known as: COGENTIN   0.5 mg, Oral, Daily      clopidogrel 75 MG tablet  Commonly known as: PLAVIX   75 mg, Oral, Daily      levothyroxine 25 MCG tablet  Commonly known as: SYNTHROID, LEVOTHROID   25 mcg, Oral, Daily      lisinopril 20 MG tablet  Commonly known as: PRINIVIL,ZESTRIL   20 mg, Oral, Daily      metoprolol tartrate 50 MG tablet  Commonly known as: LOPRESSOR   1 tablet, Oral, Every 12 Hours Scheduled      omeprazole 40 MG capsule  Commonly known as: priLOSEC   40 mg, Oral, Daily      Paliperidone Palmitate 546 MG/1.75ML suspension IM injection  Commonly known as: INVEGA TRINZA   546 mg, Intramuscular, Every 3 Months             Stop These Medications      dicyclomine 20 MG tablet  Commonly known as: BENTYL     metFORMIN 500 MG tablet  Commonly known as: GLUCOPHAGE     ondansetron ODT 4 MG disintegrating tablet  Commonly known as: ZOFRAN-ODT     pantoprazole 40 MG EC tablet  Commonly known as: PROTONIX              Discharge Diet:             Follow-up Appointments:        I will see the patient in the office in 8 to 10 days  Follow-up with Dr. Hong as per his instructions    Test Results Pending at Discharge:      Pending Labs       Order Current Status    TISSUE EXAM, [facility] ([facility]) Collected (05/10/24 8087)               Rod Vance MD  05/12/24  13:21 EDT    Time:  Discharge 38 min    Please note that portions of this note were completed with a voice recognition  program.          Electronically signed by Rod Vance MD at 05/12/24 0240

## 2024-05-13 NOTE — OUTREACH NOTE
Prep Survey      Flowsheet Row Responses   [facility] facility patient discharged from? [facility]   Is LACE score < 7 ? No   Eligibility Readm Mgmt   Discharge diagnosis Femoral neck fracture, hip hemiarthoplasty   Does the patient have one of the following disease processes/diagnoses(primary or secondary)? General Surgery   Does the patient have Home health ordered? No   Is there a DME ordered? Yes   What DME was ordered? Walker   Medication alerts for this patient see avs   Prep survey completed? Yes            Ani VALERIO - Registered Nurse

## 2024-05-15 LAB — REF LAB TEST METHOD: NORMAL

## 2024-05-15 NOTE — CASE MANAGEMENT/SOCIAL WORK
Phoned [facility] who states pt has not come from central intake to their cue, uncertain if this is delayed auth or decline of services. Will recheck 5/16.

## 2024-05-16 NOTE — CASE MANAGEMENT/SOCIAL WORK
11:25 EDT  Spoke with patient about potential for outpatient PT/OT, stated she would be agreeable. Called Dr. Vance's office to notify MD that we were unable to set up home health and patient would likely require outpatient therapy. Message left with his office. Patient has follow up with Dr. Vance on Monday.

## 2024-05-20 ENCOUNTER — READMISSION MANAGEMENT (OUTPATIENT)
Dept: CALL CENTER | Facility: HOSPITAL | Age: 69
End: 2024-05-20
Payer: MEDICARE

## 2024-05-20 NOTE — OUTREACH NOTE
General Surgery Week 1 Survey      Flowsheet Row Responses   [facility] facility patient discharged from? [facility]   Does the patient have one of the following disease processes/diagnoses(primary or secondary)? General Surgery   Week 1 attempt successful? No   Unsuccessful attempts Attempt 1  [req call back per ]            Gwendolyn FARMER - Registered Nurse

## 2024-05-23 ENCOUNTER — READMISSION MANAGEMENT (OUTPATIENT)
Dept: CALL CENTER | Facility: HOSPITAL | Age: 69
End: 2024-05-23
Payer: MEDICARE

## 2024-05-23 NOTE — OUTREACH NOTE
General Surgery Week 1 Survey      Flowsheet Row Responses   [facility] facility patient discharged from? [facility]   Does the patient have one of the following disease processes/diagnoses(primary or secondary)? General Surgery   Week 1 attempt successful? No   Call start time 1222   Unsuccessful attempts Attempt 2  [Pt currently readmitted to [facility] for dehydration and kidney infection per spouse Seymour]   Revoke Readmitted   Discharge diagnosis Femoral neck fracture, hip hemiarthoplasty            Aarti VALERIO - Registered Nurse

## 2024-06-20 ENCOUNTER — HOSPITAL ENCOUNTER (EMERGENCY)
Facility: HOSPITAL | Age: 69
Discharge: HOME | End: 2024-06-20
Attending: EMERGENCY MEDICINE
Payer: MEDICARE

## 2024-06-20 VITALS
TEMPERATURE: 98.3 F | RESPIRATION RATE: 16 BRPM | HEART RATE: 67 BPM | HEIGHT: 67 IN | OXYGEN SATURATION: 99 % | DIASTOLIC BLOOD PRESSURE: 95 MMHG | WEIGHT: 128.75 LBS | BODY MASS INDEX: 20.21 KG/M2 | SYSTOLIC BLOOD PRESSURE: 173 MMHG

## 2024-06-20 DIAGNOSIS — N39.0 URINARY TRACT INFECTION WITHOUT HEMATURIA, SITE UNSPECIFIED: ICD-10-CM

## 2024-06-20 DIAGNOSIS — R33.9 URINARY RETENTION: Primary | ICD-10-CM

## 2024-06-20 LAB
ALBUMIN SERPL-MCNC: 3.3 G/DL (ref 3.5–5.2)
ALBUMIN/GLOB SERPL: 0.8 G/DL
ALP SERPL-CCNC: 114 U/L (ref 39–117)
ALT SERPL W P-5'-P-CCNC: 22 U/L (ref 1–33)
ANION GAP SERPL CALCULATED.3IONS-SCNC: 11.4 MMOL/L (ref 5–15)
AST SERPL-CCNC: 29 U/L (ref 1–32)
BACTERIA UR QL AUTO: ABNORMAL /HPF
BASOPHILS # BLD AUTO: 0.05 10*3/MM3 (ref 0–0.2)
BASOPHILS NFR BLD AUTO: 0.5 % (ref 0–1.5)
BILIRUB SERPL-MCNC: 0.4 MG/DL (ref 0–1.2)
BILIRUB UR QL STRIP: NEGATIVE
BUN SERPL-MCNC: 10 MG/DL (ref 8–23)
BUN/CREAT SERPL: 7.1 (ref 7–25)
CALCIUM SPEC-SCNC: 9.1 MG/DL (ref 8.6–10.5)
CHLORIDE SERPL-SCNC: 99 MMOL/L (ref 98–107)
CLARITY UR: CLEAR
CO2 SERPL-SCNC: 23.6 MMOL/L (ref 22–29)
COLOR UR: YELLOW
CREAT SERPL-MCNC: 1.4 MG/DL (ref 0.57–1)
DEPRECATED RDW RBC AUTO: 52.7 FL (ref 37–54)
EGFRCR SERPLBLD CKD-EPI 2021: 40.3 ML/MIN/1.73
EOSINOPHIL # BLD AUTO: 0.09 10*3/MM3 (ref 0–0.4)
EOSINOPHIL NFR BLD AUTO: 0.8 % (ref 0.3–6.2)
ERYTHROCYTE [DISTWIDTH] IN BLOOD BY AUTOMATED COUNT: 15.7 % (ref 12.3–15.4)
GLOBULIN UR ELPH-MCNC: 4.1 GM/DL
GLUCOSE SERPL-MCNC: 114 MG/DL (ref 65–99)
GLUCOSE UR STRIP-MCNC: NEGATIVE MG/DL
HCT VFR BLD AUTO: 26.7 % (ref 34–46.6)
HGB BLD-MCNC: 8.6 G/DL (ref 12–15.9)
HGB UR QL STRIP.AUTO: NEGATIVE
HYALINE CASTS UR QL AUTO: ABNORMAL /LPF
IMM GRANULOCYTES # BLD AUTO: 0.03 10*3/MM3 (ref 0–0.05)
IMM GRANULOCYTES NFR BLD AUTO: 0.3 % (ref 0–0.5)
KETONES UR QL STRIP: NEGATIVE
LEUKOCYTE ESTERASE UR QL STRIP.AUTO: ABNORMAL
LYMPHOCYTES # BLD AUTO: 1.99 10*3/MM3 (ref 0.7–3.1)
LYMPHOCYTES NFR BLD AUTO: 18.3 % (ref 19.6–45.3)
MCH RBC QN AUTO: 29.6 PG (ref 26.6–33)
MCHC RBC AUTO-ENTMCNC: 32.2 G/DL (ref 31.5–35.7)
MCV RBC AUTO: 91.8 FL (ref 79–97)
MONOCYTES # BLD AUTO: 1.18 10*3/MM3 (ref 0.1–0.9)
MONOCYTES NFR BLD AUTO: 10.8 % (ref 5–12)
NEUTROPHILS NFR BLD AUTO: 69.3 % (ref 42.7–76)
NEUTROPHILS NFR BLD AUTO: 7.54 10*3/MM3 (ref 1.7–7)
NITRITE UR QL STRIP: NEGATIVE
NRBC BLD AUTO-RTO: 0 /100 WBC (ref 0–0.2)
PH UR STRIP.AUTO: 6.5 [PH] (ref 5–8)
PLATELET # BLD AUTO: 340 10*3/MM3 (ref 140–450)
PMV BLD AUTO: 9.5 FL (ref 6–12)
POTASSIUM SERPL-SCNC: 4.6 MMOL/L (ref 3.5–5.2)
PROT SERPL-MCNC: 7.4 G/DL (ref 6–8.5)
PROT UR QL STRIP: NEGATIVE
RBC # BLD AUTO: 2.91 10*6/MM3 (ref 3.77–5.28)
RBC # UR STRIP: ABNORMAL /HPF
REF LAB TEST METHOD: ABNORMAL
SODIUM SERPL-SCNC: 134 MMOL/L (ref 136–145)
SP GR UR STRIP: <=1.005 (ref 1–1.03)
SQUAMOUS #/AREA URNS HPF: ABNORMAL /HPF
UROBILINOGEN UR QL STRIP: ABNORMAL
WBC # UR STRIP: ABNORMAL /HPF
WBC NRBC COR # BLD AUTO: 10.88 10*3/MM3 (ref 3.4–10.8)

## 2024-06-20 PROCEDURE — 36415 COLL VENOUS BLD VENIPUNCTURE: CPT

## 2024-06-20 PROCEDURE — 51798 US URINE CAPACITY MEASURE: CPT

## 2024-06-20 PROCEDURE — 85025 COMPLETE CBC W/AUTO DIFF WBC: CPT | Performed by: NURSE PRACTITIONER

## 2024-06-20 PROCEDURE — 87077 CULTURE AEROBIC IDENTIFY: CPT | Performed by: NURSE PRACTITIONER

## 2024-06-20 PROCEDURE — 96360 HYDRATION IV INFUSION INIT: CPT

## 2024-06-20 PROCEDURE — 80053 COMPREHEN METABOLIC PANEL: CPT | Performed by: NURSE PRACTITIONER

## 2024-06-20 PROCEDURE — 87186 SC STD MICRODIL/AGAR DIL: CPT | Performed by: NURSE PRACTITIONER

## 2024-06-20 PROCEDURE — 81001 URINALYSIS AUTO W/SCOPE: CPT | Performed by: NURSE PRACTITIONER

## 2024-06-20 PROCEDURE — 99283 EMERGENCY DEPT VISIT LOW MDM: CPT

## 2024-06-20 PROCEDURE — 51702 INSERT TEMP BLADDER CATH: CPT

## 2024-06-20 PROCEDURE — 87086 URINE CULTURE/COLONY COUNT: CPT | Performed by: NURSE PRACTITIONER

## 2024-06-20 PROCEDURE — 25810000003 SODIUM CHLORIDE 0.9 % SOLUTION: Performed by: NURSE PRACTITIONER

## 2024-06-20 RX ORDER — CEFDINIR 300 MG/1
300 CAPSULE ORAL 2 TIMES DAILY
Qty: 14 CAPSULE | Refills: 0 | Status: SHIPPED | OUTPATIENT
Start: 2024-06-20 | End: 2024-06-27

## 2024-06-20 RX ADMIN — SODIUM CHLORIDE 1000 ML: 9 INJECTION, SOLUTION INTRAVENOUS at 19:55

## 2024-06-20 NOTE — ED PROVIDER NOTES
Subjective:  History of Present Illness:    Patient is a 69-year-old female without contributing health history.  Presents to the ER today for urinate.  Patient reports that she was recently admitted to the hospital.  Was sent home with urinary catheter.  Urinary catheter was pulled approximately 12:00 today.  Reports that she has not urinated since urinary catheter was pulled.  She denies fever.  She denies suprapubic pain or abdominal pain.  Any other associated symptom.  Denies OTC medication or home remedy.  Denies alleviating or exacerbating factors.    Nurses Notes reviewed and agree, including vitals, allergies, social history and prior medical history.     REVIEW OF SYSTEMS: All systems reviewed and not pertinent unless noted.  Review of Systems   Genitourinary:  Positive for difficulty urinating.   All other systems reviewed and are negative.      Past Medical History:   Diagnosis Date    Anxiety     Arthritis     CAD (coronary artery disease)     stent 2007    Chronic kidney disease     SEES DR. MIRANDA    Constipation     Depression     Diabetes mellitus     Diarrhea     Dysphagia     SPOUSE REPORTS VERY MILD.    Elevated cholesterol     Gastric ulcer     GERD (gastroesophageal reflux disease)     Heart disease     History of chest pain     SPOUSE REPORTS APPROXIMATLEY FEW MONTHS AGO AND THAT PATIENT HAD WORK UP WITH DR MAGANA.  STATES HAD NUCLEAR STRESS AND IT WAS WNL.      Hyperlipidemia     Hypertension     Hypokalemia     Impaired functional mobility, balance, gait, and endurance     Kidney stone     Low sodium levels 2017    Psychosis     PVD (peripheral vascular disease)     Schizophrenia     SPOUSE REPORTED    Seizures 01/2016    ONLY HAD ONE EPISODE.  STATED SIDE EFFECTS OF MEDICATION    Sleep apnea     SPOUSE REPORTS PATIENT WAS NOT ABLE TO TOLERATE THE CPAP.      Thyroid condition     TIA (transient ischemic attack)     2010.  SPOUSE REPORTS NO RESIDUAL PROBLEMS.    Tremors of nervous system  "    Poss. r/t medications pt is on.    Uterine fibroid     Vitamin B12 deficiency        Allergies:    Penicillins      Past Surgical History:   Procedure Laterality Date    CARDIAC CATHETERIZATION      SPOUSE REPORTS  AND  (REPORTS A TOTAL OF 4 STENTS WERE PLACED)    CARDIAC SURGERY      SPOUSE REPORTS 2 STENTS PLACED IN  AND 2 STENTS IN     COLONOSCOPY N/A 2018    Procedure: COLONOSCOPY with cold biopsy polypectomies and biopsies;  Surgeon: Servando Rooney MD;  Location: [facility] ENDOSCOPY;  Service: Gastroenterology    CYST REMOVAL      SPOUSE REPORTS REMOVED FROM NECK    ENDOSCOPY N/A 2017    Procedure: ESOPHAGOGASTRODUODENOSCOPY WITH BIOPSIES;  Surgeon: Servando Rooney MD;  Location: [facility] ENDOSCOPY;  Service:     ENDOSCOPY N/A 2018    Procedure: ESOPHAGOGASTRODUODENOSCOPY with biopsies;  Surgeon: Servando Rooney MD;  Location: [facility] ENDOSCOPY;  Service:     ENDOSCOPY      ENDOSCOPY N/A 2018    Procedure: ESOPHAGOGASTRODUODENOSCOPY WITH BIOPSIES;  Surgeon: Servando Rooney MD;  Location: [facility] ENDOSCOPY;  Service: Gastroenterology    HIP HEMIARTHROPLASTY Left 5/10/2024    Procedure: HIP HEMIARTHROPLASTY;  Surgeon: Isac Hong MD;  Location: [facility] OR;  Service: Orthopedics;  Laterality: Left;    OTHER SURGICAL HISTORY      SPOUSE REPORTS PATIENT ATTEMPTED COLONOSCOPY BEFORE BUT PREP WAS NOT COMPLETE    TUBAL ABDOMINAL LIGATION           Social History     Socioeconomic History    Marital status:    Tobacco Use    Smoking status: Former     Current packs/day: 0.00     Average packs/day: 0.3 packs/day for 5.0 years (1.3 ttl pk-yrs)     Types: Cigarettes     Start date: 1987     Quit date: 1992     Years since quittin.7    Smokeless tobacco: Never    Tobacco comments:     SPOUSE REPORTS \"SHE DIDN'T INHALE\"   Vaping Use    Vaping status: Never Used   Substance and Sexual Activity    Alcohol use: No    Drug use: No    Sexual activity: Defer         Family " "History   Problem Relation Age of Onset    Heart disease Mother     Hypertension Mother     Alcohol abuse Father     Colon cancer Neg Hx        Objective  Physical Exam:  /95   Pulse 67   Temp 98.3 °F (36.8 °C) (Oral)   Resp 16   Ht 170.2 cm (67.01\")   Wt 58.4 kg (128 lb 12 oz)   LMP  (LMP Unknown) Comment: postmenopausal  SpO2 99%   BMI 20.16 kg/m²      Physical Exam  Vitals and nursing note reviewed.   Constitutional:       Appearance: Normal appearance. She is normal weight.   HENT:      Head: Normocephalic and atraumatic.      Nose: Nose normal.      Mouth/Throat:      Mouth: Mucous membranes are moist.      Pharynx: Oropharynx is clear.   Eyes:      Extraocular Movements: Extraocular movements intact.      Conjunctiva/sclera: Conjunctivae normal.      Pupils: Pupils are equal, round, and reactive to light.   Cardiovascular:      Rate and Rhythm: Normal rate and regular rhythm.   Pulmonary:      Effort: Pulmonary effort is normal.      Breath sounds: Normal breath sounds.   Abdominal:      General: Abdomen is flat. Bowel sounds are normal.      Palpations: Abdomen is soft.   Musculoskeletal:         General: Normal range of motion.      Cervical back: Neck supple.   Skin:     General: Skin is warm and dry.      Capillary Refill: Capillary refill takes less than 2 seconds.   Neurological:      General: No focal deficit present.      Mental Status: She is alert and oriented to person, place, and time. Mental status is at baseline.   Psychiatric:         Mood and Affect: Mood normal.         Behavior: Behavior normal.         Thought Content: Thought content normal.         Judgment: Judgment normal.         Procedures    ED Course:    ED Course as of 06/20/24 2104   Thu Jun 20, 2024   1903 Patient presenting with acute urinary retention after having Nixon taken out after admission for sepsis.  Patient with mild leukocytosis, afebrile and stable vitals.  Bladder scan demonstrated only 40 mL in the " bladder.  Plan to reassess urine for urinary retention, if patient is unable to produce any urine we may need to place Nixon back in for further evaluation by urology. [CR]      ED Course User Index  [CR] Roger Tripathi DO       Lab Results (last 24 hours)       Procedure Component Value Units Date/Time    CBC Auto Differential [[id number]]  (Abnormal) Collected: 06/20/24 1854    Specimen: Blood Updated: 06/20/24 1900     WBC 10.88 10*3/mm3      RBC 2.91 10*6/mm3      Hemoglobin 8.6 g/dL      Hematocrit 26.7 %      MCV 91.8 fL      MCH 29.6 pg      MCHC 32.2 g/dL      RDW 15.7 %      RDW-SD 52.7 fl      MPV 9.5 fL      Platelets 340 10*3/mm3      Neutrophil % 69.3 %      Lymphocyte % 18.3 %      Monocyte % 10.8 %      Eosinophil % 0.8 %      Basophil % 0.5 %      Immature Grans % 0.3 %      Neutrophils, Absolute 7.54 10*3/mm3      Lymphocytes, Absolute 1.99 10*3/mm3      Monocytes, Absolute 1.18 10*3/mm3      Eosinophils, Absolute 0.09 10*3/mm3      Basophils, Absolute 0.05 10*3/mm3      Immature Grans, Absolute 0.03 10*3/mm3      nRBC 0.0 /100 WBC     Comprehensive Metabolic Panel [[id number]]  (Abnormal) Collected: 06/20/24 1854    Specimen: Blood Updated: 06/20/24 1918     Glucose 114 mg/dL      BUN 10 mg/dL      Creatinine 1.40 mg/dL      Sodium 134 mmol/L      Potassium 4.6 mmol/L      Comment: Slight hemolysis detected by analyzer. Result may be falsely elevated.        Chloride 99 mmol/L      CO2 23.6 mmol/L      Calcium 9.1 mg/dL      Total Protein 7.4 g/dL      Albumin 3.3 g/dL      ALT (SGPT) 22 U/L      AST (SGOT) 29 U/L      Comment: Slight hemolysis detected by analyzer. Result may be falsely elevated.        Alkaline Phosphatase 114 U/L      Total Bilirubin 0.4 mg/dL      Globulin 4.1 gm/dL      A/G Ratio 0.8 g/dL      BUN/Creatinine Ratio 7.1     Anion Gap 11.4 mmol/L      eGFR 40.3 mL/min/1.73     Narrative:      GFR Normal >60  Chronic Kidney Disease <60  Kidney Failure <15    The GFR  formula is only valid for adults with stable renal function between ages 18 and 70.    Urinalysis With Culture If Indicated - Indwelling Urethral Catheter [[id number]]  (Abnormal) Collected: 06/20/24 2024    Specimen: Urine from Indwelling Urethral Catheter Updated: 06/20/24 2030     Color, UA Yellow     Appearance, UA Clear     pH, UA 6.5     Specific Gravity, UA <=1.005     Glucose, UA Negative     Ketones, UA Negative     Bilirubin, UA Negative     Blood, UA Negative     Protein, UA Negative     Leuk Esterase, UA Large (3+)     Nitrite, UA Negative     Urobilinogen, UA 0.2 E.U./dL    Narrative:      In absence of clinical symptoms, the presence of pyuria, bacteria, and/or nitrites on the urinalysis result does not correlate with infection.    Urinalysis, Microscopic Only - Indwelling Urethral Catheter [[id number]]  (Abnormal) Collected: 06/20/24 2024    Specimen: Urine from Indwelling Urethral Catheter Updated: 06/20/24 2059     RBC, UA None Seen /HPF      WBC, UA 21-50 /HPF      Bacteria, UA 3+ /HPF      Squamous Epithelial Cells, UA 0-2 /HPF      Hyaline Casts, UA None Seen /LPF      Methodology Manual Light Microscopy    Urine Culture - Urine, Indwelling Urethral Catheter [[id number]] Collected: 06/20/24 2024    Specimen: Urine from Indwelling Urethral Catheter Updated: 06/20/24 2059             No radiology results from the last 24 hrs       MDM     Amount and/or Complexity of Data Reviewed  Decide to obtain previous medical records or to obtain history from someone other than the patient: yes        Initial impression of presenting illness: Patient is a 69-year-old female without contributing health history.  Presents to the ER today for urinate.  Patient reports that she was recently admitted to the hospital.  Was sent home with urinary catheter.  Urinary catheter was pulled approximately 12:00 today.  Reports that she has not urinated since urinary catheter was pulled.  She denies fever.  She denies  suprapubic pain or abdominal pain.  Any other associated symptom.  Denies OTC medication or home remedy.  Denies alleviating or exacerbating factors.    DDX: includes but is not limited to: UTI, acute cystitis, arthritis or other    Patient arrives stable with vitals interpreted by myself.     Pertinent features from physical exam: Lung sounds are clear bilaterally throughout.  Ab soft nontender.  Bowel sounds normal.  Heart sounds are normal..    Initial diagnostic plan: UA, bladder scan    Results from initial plan were reviewed and interpreted by me revealing UA is with bacteria and leukocytes.  Consistent with urinary tract infection.  Initial bladder scan and repeat bladder scan was in the 40s.    Diagnostic information from other sources: Chart review    Interventions / Re-evaluation: Vital signs stable throughout encounter.  Nixon catheter was anchored 600 mL of urine obtained upon initial insertion of Nixon.    Results/clinical rationale were discussed with patient    Consultations/Discussion of results with other physicians: N/A    Disposition plan: Patient is hemodynamically stable nontoxic-appearing appropriate discharge.  Will send patient home with Nixon bag.  Will send her home with antibiotic.  Have her follow-up with urologist.  -----        Final diagnoses:   Urinary retention   Urinary tract infection without hematuria, site unspecified          Boom Montaño, APRN  06/20/24 0734

## 2024-06-21 NOTE — DISCHARGE INSTRUCTIONS
Please follow-up with urologist.  Follow-up with ER for any worse symptoms.  Have called in antibiotic for urinary tract infection.

## 2024-06-22 LAB — BACTERIA SPEC AEROBE CULT: ABNORMAL

## 2024-08-22 ENCOUNTER — TRANSCRIBE ORDERS (OUTPATIENT)
Dept: ADMINISTRATIVE | Facility: HOSPITAL | Age: 69
End: 2024-08-22
Payer: MEDICARE

## 2024-08-22 DIAGNOSIS — R31.1 BENIGN MICROSCOPIC HEMATURIA: Primary | ICD-10-CM

## 2025-03-07 ENCOUNTER — TRANSCRIBE ORDERS (OUTPATIENT)
Dept: ADMINISTRATIVE | Facility: HOSPITAL | Age: 70
End: 2025-03-07
Payer: MEDICARE

## 2025-03-07 DIAGNOSIS — Z12.31 VISIT FOR SCREENING MAMMOGRAM: Primary | ICD-10-CM

## 2025-05-11 NOTE — CASE MANAGEMENT/SOCIAL WORK
Problem: Infection  Goal: Absence of Infection Signs and Symptoms  Outcome: Progressing     Problem: Adult Inpatient Plan of Care  Goal: Plan of Care Review  Outcome: Progressing  Goal: Patient-Specific Goal (Individualized)  Outcome: Progressing  Goal: Absence of Hospital-Acquired Illness or Injury  Outcome: Progressing  Goal: Optimal Comfort and Wellbeing  Outcome: Progressing  Goal: Readiness for Transition of Care  Outcome: Progressing     Problem: Diabetes Comorbidity  Goal: Blood Glucose Level Within Targeted Range  Outcome: Progressing     Problem: Acute Kidney Injury/Impairment  Goal: Fluid and Electrolyte Balance  Outcome: Progressing  Goal: Improved Oral Intake  Outcome: Progressing  Goal: Effective Renal Function  Outcome: Progressing     Problem: Wound  Goal: Optimal Coping  Outcome: Progressing  Goal: Optimal Functional Ability  Outcome: Progressing  Goal: Absence of Infection Signs and Symptoms  Outcome: Progressing  Goal: Improved Oral Intake  Outcome: Progressing  Goal: Optimal Pain Control and Function  Outcome: Progressing  Goal: Skin Health and Integrity  Outcome: Progressing  Goal: Optimal Wound Healing  Outcome: Progressing     Problem: Skin Injury Risk Increased  Goal: Skin Health and Integrity  Outcome: Progressing     Problem: Fall Injury Risk  Goal: Absence of Fall and Fall-Related Injury  Outcome: Progressing     Problem: Pain Acute  Goal: Optimal Pain Control and Function  Outcome: Progressing      Case Management Discharge Note           Provided Post Acute Provider List?: N/A  Provided Post Acute Provider Quality & Resource List?: N/A    Selected Continued Care - Admitted Since 5/8/2024       Destination    No services have been selected for the patient.                Durable Medical Equipment    No services have been selected for the patient.                Dialysis/Infusion    No services have been selected for the patient.                Home Medical Care    No services have been selected for the patient.                Therapy    No services have been selected for the patient.                Community Resources    No services have been selected for the patient.                Community & DME    No services have been selected for the patient.                    Transportation Services  Private: Car    Final Discharge Disposition Code: 06 - home with home health care

## 2025-08-01 ENCOUNTER — HOSPITAL ENCOUNTER (INPATIENT)
Facility: HOSPITAL | Age: 70
LOS: 3 days | Discharge: HOME | DRG: 641 | End: 2025-08-04
Attending: INTERNAL MEDICINE | Admitting: INTERNAL MEDICINE
Payer: MEDICARE

## 2025-08-01 DIAGNOSIS — R33.8 ACUTE URINARY RETENTION: Primary | ICD-10-CM

## 2025-08-01 DIAGNOSIS — M62.81 ABNORMAL GAIT DUE TO MUSCLE WEAKNESS: ICD-10-CM

## 2025-08-01 DIAGNOSIS — R26.9 ABNORMAL GAIT DUE TO MUSCLE WEAKNESS: ICD-10-CM

## 2025-08-01 LAB
ALBUMIN SERPL-MCNC: 3.3 G/DL (ref 3.5–5.2)
ALBUMIN/GLOB SERPL: 1.2 G/DL
ALP SERPL-CCNC: 165 U/L (ref 39–117)
ALT SERPL W P-5'-P-CCNC: 10 U/L (ref 1–33)
ANION GAP SERPL CALCULATED.3IONS-SCNC: 9.5 MMOL/L (ref 5–15)
AST SERPL-CCNC: 11 U/L (ref 1–32)
BASOPHILS # BLD AUTO: 0.03 10*3/MM3 (ref 0–0.2)
BASOPHILS NFR BLD AUTO: 0.3 % (ref 0–1.5)
BILIRUB SERPL-MCNC: 0.2 MG/DL (ref 0–1.2)
BUN SERPL-MCNC: 18 MG/DL (ref 8–23)
BUN/CREAT SERPL: 12.2 (ref 7–25)
CALCIUM SPEC-SCNC: 9 MG/DL (ref 8.6–10.5)
CHLORIDE SERPL-SCNC: 82 MMOL/L (ref 98–107)
CO2 SERPL-SCNC: 23.5 MMOL/L (ref 22–29)
CREAT SERPL-MCNC: 1.47 MG/DL (ref 0.57–1)
DEPRECATED RDW RBC AUTO: 50.6 FL (ref 37–54)
EGFRCR SERPLBLD CKD-EPI 2021: 37.8 ML/MIN/1.73
EOSINOPHIL # BLD AUTO: 0.07 10*3/MM3 (ref 0–0.4)
EOSINOPHIL NFR BLD AUTO: 0.8 % (ref 0.3–6.2)
ERYTHROCYTE [DISTWIDTH] IN BLOOD BY AUTOMATED COUNT: 15.3 % (ref 12.3–15.4)
GLOBULIN UR ELPH-MCNC: 2.7 GM/DL
GLUCOSE SERPL-MCNC: 128 MG/DL (ref 65–99)
HCT VFR BLD AUTO: 26.4 % (ref 34–46.6)
HGB BLD-MCNC: 8.7 G/DL (ref 12–15.9)
IMM GRANULOCYTES # BLD AUTO: 0.02 10*3/MM3 (ref 0–0.05)
IMM GRANULOCYTES NFR BLD AUTO: 0.2 % (ref 0–0.5)
LYMPHOCYTES # BLD AUTO: 0.84 10*3/MM3 (ref 0.7–3.1)
LYMPHOCYTES NFR BLD AUTO: 9.2 % (ref 19.6–45.3)
MCH RBC QN AUTO: 29.7 PG (ref 26.6–33)
MCHC RBC AUTO-ENTMCNC: 33 G/DL (ref 31.5–35.7)
MCV RBC AUTO: 90.1 FL (ref 79–97)
MONOCYTES # BLD AUTO: 0.78 10*3/MM3 (ref 0.1–0.9)
MONOCYTES NFR BLD AUTO: 8.6 % (ref 5–12)
NEUTROPHILS NFR BLD AUTO: 7.35 10*3/MM3 (ref 1.7–7)
NEUTROPHILS NFR BLD AUTO: 80.9 % (ref 42.7–76)
NRBC BLD AUTO-RTO: 0 /100 WBC (ref 0–0.2)
OSMOLALITY SERPL: 253 MOSM/KG (ref 280–301)
OSMOLALITY UR: 118 MOSM/KG
PLATELET # BLD AUTO: 273 10*3/MM3 (ref 140–450)
PMV BLD AUTO: 8.9 FL (ref 6–12)
POTASSIUM SERPL-SCNC: 4.6 MMOL/L (ref 3.5–5.2)
PROT SERPL-MCNC: 6 G/DL (ref 6–8.5)
RBC # BLD AUTO: 2.93 10*6/MM3 (ref 3.77–5.28)
SODIUM SERPL-SCNC: 115 MMOL/L (ref 136–145)
SODIUM UR-SCNC: <20 MMOL/L
TSH SERPL DL<=0.05 MIU/L-ACNC: 1.63 UIU/ML (ref 0.27–4.2)
WBC NRBC COR # BLD AUTO: 9.09 10*3/MM3 (ref 3.4–10.8)

## 2025-08-01 PROCEDURE — 87186 SC STD MICRODIL/AGAR DIL: CPT | Performed by: INTERNAL MEDICINE

## 2025-08-01 PROCEDURE — 87086 URINE CULTURE/COLONY COUNT: CPT | Performed by: INTERNAL MEDICINE

## 2025-08-01 PROCEDURE — 83930 ASSAY OF BLOOD OSMOLALITY: CPT | Performed by: INTERNAL MEDICINE

## 2025-08-01 PROCEDURE — 80053 COMPREHEN METABOLIC PANEL: CPT | Performed by: INTERNAL MEDICINE

## 2025-08-01 PROCEDURE — 81001 URINALYSIS AUTO W/SCOPE: CPT | Performed by: INTERNAL MEDICINE

## 2025-08-01 PROCEDURE — 87077 CULTURE AEROBIC IDENTIFY: CPT | Performed by: INTERNAL MEDICINE

## 2025-08-01 PROCEDURE — 83935 ASSAY OF URINE OSMOLALITY: CPT | Performed by: INTERNAL MEDICINE

## 2025-08-01 PROCEDURE — 93005 ELECTROCARDIOGRAM TRACING: CPT | Performed by: INTERNAL MEDICINE

## 2025-08-01 PROCEDURE — 85025 COMPLETE CBC W/AUTO DIFF WBC: CPT | Performed by: INTERNAL MEDICINE

## 2025-08-01 PROCEDURE — 93010 ELECTROCARDIOGRAM REPORT: CPT | Performed by: INTERNAL MEDICINE

## 2025-08-01 PROCEDURE — 25810000003 SODIUM CHLORIDE 0.9 % SOLUTION: Performed by: INTERNAL MEDICINE

## 2025-08-01 PROCEDURE — 84443 ASSAY THYROID STIM HORMONE: CPT | Performed by: INTERNAL MEDICINE

## 2025-08-01 PROCEDURE — 84300 ASSAY OF URINE SODIUM: CPT | Performed by: INTERNAL MEDICINE

## 2025-08-01 RX ORDER — TRAMADOL HYDROCHLORIDE 50 MG/1
50 TABLET ORAL EVERY 12 HOURS PRN
COMMUNITY
End: 2025-08-04 | Stop reason: HOSPADM

## 2025-08-01 RX ORDER — ONDANSETRON 8 MG/1
8 TABLET, FILM COATED ORAL EVERY 8 HOURS PRN
COMMUNITY
End: 2025-08-04 | Stop reason: HOSPADM

## 2025-08-01 RX ORDER — FUROSEMIDE 20 MG/1
20 TABLET ORAL DAILY
COMMUNITY
End: 2025-08-04 | Stop reason: HOSPADM

## 2025-08-01 RX ORDER — LEVOTHYROXINE SODIUM 25 UG/1
25 TABLET ORAL
Status: DISCONTINUED | OUTPATIENT
Start: 2025-08-02 | End: 2025-08-04 | Stop reason: HOSPADM

## 2025-08-01 RX ORDER — SODIUM CHLORIDE 9 MG/ML
50 INJECTION, SOLUTION INTRAVENOUS CONTINUOUS
Status: DISCONTINUED | OUTPATIENT
Start: 2025-08-01 | End: 2025-08-02

## 2025-08-01 RX ORDER — NITROGLYCERIN 0.4 MG/1
0.4 TABLET SUBLINGUAL
Status: DISCONTINUED | OUTPATIENT
Start: 2025-08-01 | End: 2025-08-04 | Stop reason: HOSPADM

## 2025-08-01 RX ORDER — TAMSULOSIN HYDROCHLORIDE 0.4 MG/1
1 CAPSULE ORAL DAILY
COMMUNITY

## 2025-08-01 RX ADMIN — SODIUM CHLORIDE 50 ML/HR: 9 INJECTION, SOLUTION INTRAVENOUS at 13:59

## 2025-08-02 PROBLEM — N39.0 UTI (URINARY TRACT INFECTION), BACTERIAL: Status: ACTIVE | Noted: 2025-08-02

## 2025-08-02 PROBLEM — R33.8 ACUTE URINARY RETENTION: Status: ACTIVE | Noted: 2025-08-02

## 2025-08-02 PROBLEM — A49.9 UTI (URINARY TRACT INFECTION), BACTERIAL: Status: ACTIVE | Noted: 2025-08-02

## 2025-08-02 LAB
ALBUMIN SERPL-MCNC: 3.2 G/DL (ref 3.5–5.2)
ALBUMIN/GLOB SERPL: 1.2 G/DL
ALP SERPL-CCNC: 178 U/L (ref 39–117)
ALT SERPL W P-5'-P-CCNC: 10 U/L (ref 1–33)
ANION GAP SERPL CALCULATED.3IONS-SCNC: 9.7 MMOL/L (ref 5–15)
AST SERPL-CCNC: 13 U/L (ref 1–32)
BACTERIA UR QL AUTO: ABNORMAL /HPF
BASOPHILS # BLD AUTO: 0.06 10*3/MM3 (ref 0–0.2)
BASOPHILS NFR BLD AUTO: 0.5 % (ref 0–1.5)
BILIRUB SERPL-MCNC: 0.2 MG/DL (ref 0–1.2)
BILIRUB UR QL STRIP: NEGATIVE
BUN SERPL-MCNC: 18 MG/DL (ref 8–23)
BUN/CREAT SERPL: 13.1 (ref 7–25)
CALCIUM SPEC-SCNC: 9.1 MG/DL (ref 8.6–10.5)
CHLORIDE SERPL-SCNC: 91 MMOL/L (ref 98–107)
CLARITY UR: ABNORMAL
CO2 SERPL-SCNC: 23.3 MMOL/L (ref 22–29)
COLOR UR: YELLOW
CREAT SERPL-MCNC: 1.37 MG/DL (ref 0.57–1)
DEPRECATED RDW RBC AUTO: 50.1 FL (ref 37–54)
EGFRCR SERPLBLD CKD-EPI 2021: 41.1 ML/MIN/1.73
EOSINOPHIL # BLD AUTO: 0.27 10*3/MM3 (ref 0–0.4)
EOSINOPHIL NFR BLD AUTO: 2.4 % (ref 0.3–6.2)
ERYTHROCYTE [DISTWIDTH] IN BLOOD BY AUTOMATED COUNT: 15.2 % (ref 12.3–15.4)
GLOBULIN UR ELPH-MCNC: 2.6 GM/DL
GLUCOSE SERPL-MCNC: 102 MG/DL (ref 65–99)
GLUCOSE UR STRIP-MCNC: NEGATIVE MG/DL
HCT VFR BLD AUTO: 27.3 % (ref 34–46.6)
HGB BLD-MCNC: 9.2 G/DL (ref 12–15.9)
HGB UR QL STRIP.AUTO: ABNORMAL
HYALINE CASTS UR QL AUTO: ABNORMAL /LPF
IMM GRANULOCYTES # BLD AUTO: 0.05 10*3/MM3 (ref 0–0.05)
IMM GRANULOCYTES NFR BLD AUTO: 0.4 % (ref 0–0.5)
KETONES UR QL STRIP: NEGATIVE
LEUKOCYTE ESTERASE UR QL STRIP.AUTO: ABNORMAL
LYMPHOCYTES # BLD AUTO: 1.47 10*3/MM3 (ref 0.7–3.1)
LYMPHOCYTES NFR BLD AUTO: 12.9 % (ref 19.6–45.3)
MCH RBC QN AUTO: 30.3 PG (ref 26.6–33)
MCHC RBC AUTO-ENTMCNC: 33.7 G/DL (ref 31.5–35.7)
MCV RBC AUTO: 89.8 FL (ref 79–97)
MONOCYTES # BLD AUTO: 1.05 10*3/MM3 (ref 0.1–0.9)
MONOCYTES NFR BLD AUTO: 9.2 % (ref 5–12)
NEUTROPHILS NFR BLD AUTO: 74.6 % (ref 42.7–76)
NEUTROPHILS NFR BLD AUTO: 8.48 10*3/MM3 (ref 1.7–7)
NITRITE UR QL STRIP: POSITIVE
NRBC BLD AUTO-RTO: 0 /100 WBC (ref 0–0.2)
PH UR STRIP.AUTO: 6.5 [PH] (ref 5–8)
PLATELET # BLD AUTO: 264 10*3/MM3 (ref 140–450)
PMV BLD AUTO: 9 FL (ref 6–12)
POTASSIUM SERPL-SCNC: 4.7 MMOL/L (ref 3.5–5.2)
POTASSIUM SERPL-SCNC: 5.1 MMOL/L (ref 3.5–5.2)
PROT SERPL-MCNC: 5.8 G/DL (ref 6–8.5)
PROT UR QL STRIP: NEGATIVE
RBC # BLD AUTO: 3.04 10*6/MM3 (ref 3.77–5.28)
RBC # UR STRIP: ABNORMAL /HPF
REF LAB TEST METHOD: ABNORMAL
RENAL EPI CELLS #/AREA URNS HPF: ABNORMAL /HPF
SODIUM SERPL-SCNC: 124 MMOL/L (ref 136–145)
SP GR UR STRIP: <=1.005 (ref 1–1.03)
SQUAMOUS #/AREA URNS HPF: ABNORMAL /HPF
TROPONIN T SERPL HS-MCNC: 19 NG/L
UROBILINOGEN UR QL STRIP: ABNORMAL
WBC # UR STRIP: ABNORMAL /HPF
WBC NRBC COR # BLD AUTO: 11.38 10*3/MM3 (ref 3.4–10.8)

## 2025-08-02 PROCEDURE — 84484 ASSAY OF TROPONIN QUANT: CPT | Performed by: INTERNAL MEDICINE

## 2025-08-02 PROCEDURE — 80053 COMPREHEN METABOLIC PANEL: CPT | Performed by: INTERNAL MEDICINE

## 2025-08-02 PROCEDURE — 25010000002 CEFTRIAXONE PER 250 MG: Performed by: INTERNAL MEDICINE

## 2025-08-02 PROCEDURE — 93010 ELECTROCARDIOGRAM REPORT: CPT | Performed by: INTERNAL MEDICINE

## 2025-08-02 PROCEDURE — 84132 ASSAY OF SERUM POTASSIUM: CPT | Performed by: INTERNAL MEDICINE

## 2025-08-02 PROCEDURE — 25010000002 METOPROLOL TARTRATE 5 MG/5ML SOLUTION: Performed by: INTERNAL MEDICINE

## 2025-08-02 PROCEDURE — 25810000003 DEXTROSE 5 % AND SODIUM CHLORIDE 0.9 % 5-0.9 % SOLUTION: Performed by: INTERNAL MEDICINE

## 2025-08-02 PROCEDURE — 85025 COMPLETE CBC W/AUTO DIFF WBC: CPT | Performed by: INTERNAL MEDICINE

## 2025-08-02 PROCEDURE — 93005 ELECTROCARDIOGRAM TRACING: CPT | Performed by: INTERNAL MEDICINE

## 2025-08-02 RX ORDER — DEXTROSE MONOHYDRATE AND SODIUM CHLORIDE 5; .9 G/100ML; G/100ML
50 INJECTION, SOLUTION INTRAVENOUS CONTINUOUS
Status: ACTIVE | OUTPATIENT
Start: 2025-08-02 | End: 2025-08-02

## 2025-08-02 RX ORDER — BISACODYL 10 MG
10 SUPPOSITORY, RECTAL RECTAL DAILY PRN
Status: DISCONTINUED | OUTPATIENT
Start: 2025-08-02 | End: 2025-08-04 | Stop reason: HOSPADM

## 2025-08-02 RX ORDER — CLOPIDOGREL BISULFATE 75 MG/1
75 TABLET ORAL DAILY
Status: DISCONTINUED | OUTPATIENT
Start: 2025-08-02 | End: 2025-08-04 | Stop reason: HOSPADM

## 2025-08-02 RX ORDER — LISINOPRIL 20 MG/1
20 TABLET ORAL NIGHTLY
Status: DISCONTINUED | OUTPATIENT
Start: 2025-08-02 | End: 2025-08-03

## 2025-08-02 RX ORDER — METOPROLOL SUCCINATE 50 MG/1
50 TABLET, EXTENDED RELEASE ORAL
Status: DISCONTINUED | OUTPATIENT
Start: 2025-08-02 | End: 2025-08-04 | Stop reason: HOSPADM

## 2025-08-02 RX ORDER — TAMSULOSIN HYDROCHLORIDE 0.4 MG/1
0.4 CAPSULE ORAL DAILY
Status: DISCONTINUED | OUTPATIENT
Start: 2025-08-02 | End: 2025-08-04 | Stop reason: HOSPADM

## 2025-08-02 RX ORDER — DEXTROSE MONOHYDRATE AND SODIUM CHLORIDE 5; .9 G/100ML; G/100ML
50 INJECTION, SOLUTION INTRAVENOUS CONTINUOUS
Status: ACTIVE | OUTPATIENT
Start: 2025-08-02 | End: 2025-08-03

## 2025-08-02 RX ORDER — ATORVASTATIN CALCIUM 80 MG/1
80 TABLET, FILM COATED ORAL DAILY
Status: DISCONTINUED | OUTPATIENT
Start: 2025-08-02 | End: 2025-08-04 | Stop reason: HOSPADM

## 2025-08-02 RX ORDER — PANTOPRAZOLE SODIUM 40 MG/1
40 TABLET, DELAYED RELEASE ORAL
Status: DISCONTINUED | OUTPATIENT
Start: 2025-08-03 | End: 2025-08-04 | Stop reason: HOSPADM

## 2025-08-02 RX ORDER — ACETAMINOPHEN 325 MG/1
650 TABLET ORAL EVERY 6 HOURS PRN
Status: DISCONTINUED | OUTPATIENT
Start: 2025-08-02 | End: 2025-08-04 | Stop reason: HOSPADM

## 2025-08-02 RX ORDER — AMOXICILLIN 250 MG
2 CAPSULE ORAL 2 TIMES DAILY PRN
Status: DISCONTINUED | OUTPATIENT
Start: 2025-08-02 | End: 2025-08-04 | Stop reason: HOSPADM

## 2025-08-02 RX ORDER — POLYETHYLENE GLYCOL 3350 17 G/17G
17 POWDER, FOR SOLUTION ORAL DAILY PRN
Status: DISCONTINUED | OUTPATIENT
Start: 2025-08-02 | End: 2025-08-04 | Stop reason: HOSPADM

## 2025-08-02 RX ORDER — METOPROLOL TARTRATE 1 MG/ML
2.5 INJECTION, SOLUTION INTRAVENOUS ONCE
Status: COMPLETED | OUTPATIENT
Start: 2025-08-02 | End: 2025-08-02

## 2025-08-02 RX ORDER — METOPROLOL SUCCINATE 25 MG/1
25 TABLET, EXTENDED RELEASE ORAL
Status: DISCONTINUED | OUTPATIENT
Start: 2025-08-02 | End: 2025-08-02

## 2025-08-02 RX ORDER — ASPIRIN 81 MG/1
81 TABLET ORAL DAILY
Status: DISCONTINUED | OUTPATIENT
Start: 2025-08-02 | End: 2025-08-04 | Stop reason: HOSPADM

## 2025-08-02 RX ORDER — BISACODYL 5 MG/1
5 TABLET, DELAYED RELEASE ORAL DAILY PRN
Status: DISCONTINUED | OUTPATIENT
Start: 2025-08-02 | End: 2025-08-04 | Stop reason: HOSPADM

## 2025-08-02 RX ADMIN — ASPIRIN 81 MG: 81 TABLET, COATED ORAL at 22:47

## 2025-08-02 RX ADMIN — ACETAMINOPHEN 650 MG: 325 TABLET, FILM COATED ORAL at 13:16

## 2025-08-02 RX ADMIN — DOCUSATE SODIUM 50 MG AND SENNOSIDES 8.6 MG 2 TABLET: 8.6; 5 TABLET, FILM COATED ORAL at 13:16

## 2025-08-02 RX ADMIN — ACETAMINOPHEN 650 MG: 325 TABLET, FILM COATED ORAL at 21:21

## 2025-08-02 RX ADMIN — LISINOPRIL 20 MG: 20 TABLET ORAL at 22:47

## 2025-08-02 RX ADMIN — DEXTROSE AND SODIUM CHLORIDE 50 ML/HR: 5; 900 INJECTION, SOLUTION INTRAVENOUS at 15:46

## 2025-08-02 RX ADMIN — TAMSULOSIN HYDROCHLORIDE 0.4 MG: 0.4 CAPSULE ORAL at 15:44

## 2025-08-02 RX ADMIN — METOPROLOL TARTRATE 2.5 MG: 1 INJECTION, SOLUTION INTRAVENOUS at 17:10

## 2025-08-02 RX ADMIN — ATORVASTATIN CALCIUM 80 MG: 80 TABLET, FILM COATED ORAL at 15:44

## 2025-08-02 RX ADMIN — CEFTRIAXONE 1000 MG: 1 INJECTION, POWDER, FOR SOLUTION INTRAMUSCULAR; INTRAVENOUS at 10:27

## 2025-08-02 RX ADMIN — METOPROLOL SUCCINATE 25 MG: 25 TABLET, EXTENDED RELEASE ORAL at 15:44

## 2025-08-02 RX ADMIN — CLOPIDOGREL BISULFATE 75 MG: 75 TABLET, FILM COATED ORAL at 15:44

## 2025-08-02 RX ADMIN — LEVOTHYROXINE SODIUM 25 MCG: 0.03 TABLET ORAL at 06:13

## 2025-08-02 RX ADMIN — METOPROLOL SUCCINATE 50 MG: 50 TABLET, EXTENDED RELEASE ORAL at 17:12

## 2025-08-02 RX ADMIN — DEXTROSE AND SODIUM CHLORIDE 50 ML/HR: 5; 900 INJECTION, SOLUTION INTRAVENOUS at 15:43

## 2025-08-03 LAB
ALBUMIN SERPL-MCNC: 2.8 G/DL (ref 3.5–5.2)
ALBUMIN/GLOB SERPL: 1.2 G/DL
ALP SERPL-CCNC: 157 U/L (ref 39–117)
ALT SERPL W P-5'-P-CCNC: 11 U/L (ref 1–33)
ANION GAP SERPL CALCULATED.3IONS-SCNC: 8.3 MMOL/L (ref 5–15)
AST SERPL-CCNC: 13 U/L (ref 1–32)
BILIRUB SERPL-MCNC: 0.2 MG/DL (ref 0–1.2)
BUN SERPL-MCNC: 13 MG/DL (ref 8–23)
BUN/CREAT SERPL: 10.6 (ref 7–25)
CALCIUM SPEC-SCNC: 8.8 MG/DL (ref 8.6–10.5)
CHLORIDE SERPL-SCNC: 98 MMOL/L (ref 98–107)
CO2 SERPL-SCNC: 21.7 MMOL/L (ref 22–29)
CREAT SERPL-MCNC: 1.23 MG/DL (ref 0.57–1)
DEPRECATED RDW RBC AUTO: 53.1 FL (ref 37–54)
EGFRCR SERPLBLD CKD-EPI 2021: 46.8 ML/MIN/1.73
ERYTHROCYTE [DISTWIDTH] IN BLOOD BY AUTOMATED COUNT: 15.9 % (ref 12.3–15.4)
GEN 5 1HR TROPONIN T REFLEX: 19 NG/L
GLOBULIN UR ELPH-MCNC: 2.3 GM/DL
GLUCOSE SERPL-MCNC: 104 MG/DL (ref 65–99)
HCT VFR BLD AUTO: 25.8 % (ref 34–46.6)
HGB BLD-MCNC: 8.4 G/DL (ref 12–15.9)
MCH RBC QN AUTO: 29.8 PG (ref 26.6–33)
MCHC RBC AUTO-ENTMCNC: 32.6 G/DL (ref 31.5–35.7)
MCV RBC AUTO: 91.5 FL (ref 79–97)
PLATELET # BLD AUTO: 257 10*3/MM3 (ref 140–450)
PMV BLD AUTO: 8.8 FL (ref 6–12)
POTASSIUM SERPL-SCNC: 4.9 MMOL/L (ref 3.5–5.2)
PROT SERPL-MCNC: 5.1 G/DL (ref 6–8.5)
RBC # BLD AUTO: 2.82 10*6/MM3 (ref 3.77–5.28)
SODIUM SERPL-SCNC: 128 MMOL/L (ref 136–145)
TROPONIN T % DELTA: 0
TROPONIN T NUMERIC DELTA: 0 NG/L
WBC NRBC COR # BLD AUTO: 8.86 10*3/MM3 (ref 3.4–10.8)

## 2025-08-03 PROCEDURE — 97162 PT EVAL MOD COMPLEX 30 MIN: CPT

## 2025-08-03 PROCEDURE — 25810000003 DEXTROSE 5 % AND SODIUM CHLORIDE 0.9 % 5-0.9 % SOLUTION: Performed by: INTERNAL MEDICINE

## 2025-08-03 PROCEDURE — 80053 COMPREHEN METABOLIC PANEL: CPT | Performed by: INTERNAL MEDICINE

## 2025-08-03 PROCEDURE — 85027 COMPLETE CBC AUTOMATED: CPT | Performed by: INTERNAL MEDICINE

## 2025-08-03 PROCEDURE — 25010000002 CEFTRIAXONE PER 250 MG: Performed by: INTERNAL MEDICINE

## 2025-08-03 RX ORDER — DEXTROSE MONOHYDRATE AND SODIUM CHLORIDE 5; .9 G/100ML; G/100ML
50 INJECTION, SOLUTION INTRAVENOUS CONTINUOUS
Status: DISCONTINUED | OUTPATIENT
Start: 2025-08-03 | End: 2025-08-04 | Stop reason: HOSPADM

## 2025-08-03 RX ORDER — LISINOPRIL 20 MG/1
20 TABLET ORAL EVERY 12 HOURS SCHEDULED
Status: DISCONTINUED | OUTPATIENT
Start: 2025-08-03 | End: 2025-08-04 | Stop reason: HOSPADM

## 2025-08-03 RX ADMIN — METOPROLOL SUCCINATE 50 MG: 50 TABLET, EXTENDED RELEASE ORAL at 09:47

## 2025-08-03 RX ADMIN — TAMSULOSIN HYDROCHLORIDE 0.4 MG: 0.4 CAPSULE ORAL at 09:47

## 2025-08-03 RX ADMIN — PANTOPRAZOLE SODIUM 40 MG: 40 TABLET, DELAYED RELEASE ORAL at 05:52

## 2025-08-03 RX ADMIN — LISINOPRIL 20 MG: 20 TABLET ORAL at 21:18

## 2025-08-03 RX ADMIN — CEFTRIAXONE 1000 MG: 1 INJECTION, POWDER, FOR SOLUTION INTRAMUSCULAR; INTRAVENOUS at 09:48

## 2025-08-03 RX ADMIN — DEXTROSE AND SODIUM CHLORIDE 50 ML/HR: 5; 900 INJECTION, SOLUTION INTRAVENOUS at 14:13

## 2025-08-03 RX ADMIN — CLOPIDOGREL BISULFATE 75 MG: 75 TABLET, FILM COATED ORAL at 09:47

## 2025-08-03 RX ADMIN — ATORVASTATIN CALCIUM 80 MG: 80 TABLET, FILM COATED ORAL at 09:48

## 2025-08-03 RX ADMIN — LISINOPRIL 20 MG: 20 TABLET ORAL at 14:12

## 2025-08-03 RX ADMIN — LEVOTHYROXINE SODIUM 25 MCG: 0.03 TABLET ORAL at 05:52

## 2025-08-04 VITALS
DIASTOLIC BLOOD PRESSURE: 78 MMHG | RESPIRATION RATE: 16 BRPM | SYSTOLIC BLOOD PRESSURE: 150 MMHG | TEMPERATURE: 98.6 F | OXYGEN SATURATION: 97 % | BODY MASS INDEX: 25.49 KG/M2 | WEIGHT: 153 LBS | HEIGHT: 65 IN | HEART RATE: 96 BPM

## 2025-08-04 LAB
ALBUMIN SERPL-MCNC: 2.9 G/DL (ref 3.5–5.2)
ALBUMIN/GLOB SERPL: 1.2 G/DL
ALP SERPL-CCNC: 147 U/L (ref 39–117)
ALT SERPL W P-5'-P-CCNC: 15 U/L (ref 1–33)
ANION GAP SERPL CALCULATED.3IONS-SCNC: 7.8 MMOL/L (ref 5–15)
AST SERPL-CCNC: 16 U/L (ref 1–32)
BACTERIA SPEC AEROBE CULT: ABNORMAL
BILIRUB SERPL-MCNC: 0.2 MG/DL (ref 0–1.2)
BUN SERPL-MCNC: 12 MG/DL (ref 8–23)
BUN/CREAT SERPL: 9.5 (ref 7–25)
CALCIUM SPEC-SCNC: 8.6 MG/DL (ref 8.6–10.5)
CHLORIDE SERPL-SCNC: 102 MMOL/L (ref 98–107)
CO2 SERPL-SCNC: 21.2 MMOL/L (ref 22–29)
CREAT SERPL-MCNC: 1.26 MG/DL (ref 0.57–1)
DEPRECATED RDW RBC AUTO: 54.5 FL (ref 37–54)
EGFRCR SERPLBLD CKD-EPI 2021: 45.5 ML/MIN/1.73
ERYTHROCYTE [DISTWIDTH] IN BLOOD BY AUTOMATED COUNT: 15.7 % (ref 12.3–15.4)
GLOBULIN UR ELPH-MCNC: 2.5 GM/DL
GLUCOSE SERPL-MCNC: 105 MG/DL (ref 65–99)
HCT VFR BLD AUTO: 27.4 % (ref 34–46.6)
HGB BLD-MCNC: 8.7 G/DL (ref 12–15.9)
MCH RBC QN AUTO: 30.2 PG (ref 26.6–33)
MCHC RBC AUTO-ENTMCNC: 31.8 G/DL (ref 31.5–35.7)
MCV RBC AUTO: 95.1 FL (ref 79–97)
PLATELET # BLD AUTO: 248 10*3/MM3 (ref 140–450)
PMV BLD AUTO: 8.7 FL (ref 6–12)
POTASSIUM SERPL-SCNC: 5 MMOL/L (ref 3.5–5.2)
PROT SERPL-MCNC: 5.4 G/DL (ref 6–8.5)
RBC # BLD AUTO: 2.88 10*6/MM3 (ref 3.77–5.28)
SODIUM SERPL-SCNC: 131 MMOL/L (ref 136–145)
WBC NRBC COR # BLD AUTO: 9.88 10*3/MM3 (ref 3.4–10.8)

## 2025-08-04 PROCEDURE — 85027 COMPLETE CBC AUTOMATED: CPT | Performed by: INTERNAL MEDICINE

## 2025-08-04 PROCEDURE — 80053 COMPREHEN METABOLIC PANEL: CPT | Performed by: INTERNAL MEDICINE

## 2025-08-04 RX ORDER — LISINOPRIL 20 MG/1
20 TABLET ORAL EVERY 12 HOURS SCHEDULED
Qty: 30 TABLET | Refills: 2 | Status: SHIPPED | OUTPATIENT
Start: 2025-08-04

## 2025-08-04 RX ORDER — NITROFURANTOIN 25; 75 MG/1; MG/1
100 CAPSULE ORAL 2 TIMES DAILY
Qty: 14 CAPSULE | Refills: 0 | Status: SHIPPED | OUTPATIENT
Start: 2025-08-04

## 2025-08-04 RX ORDER — METOPROLOL SUCCINATE 50 MG/1
50 TABLET, EXTENDED RELEASE ORAL
Qty: 30 TABLET | Refills: 2 | Status: SHIPPED | OUTPATIENT
Start: 2025-08-04

## 2025-08-04 RX ADMIN — PANTOPRAZOLE SODIUM 40 MG: 40 TABLET, DELAYED RELEASE ORAL at 05:38

## 2025-08-04 RX ADMIN — ATORVASTATIN CALCIUM 80 MG: 80 TABLET, FILM COATED ORAL at 09:31

## 2025-08-04 RX ADMIN — LISINOPRIL 20 MG: 20 TABLET ORAL at 09:31

## 2025-08-04 RX ADMIN — CLOPIDOGREL BISULFATE 75 MG: 75 TABLET, FILM COATED ORAL at 09:31

## 2025-08-04 RX ADMIN — LEVOTHYROXINE SODIUM 25 MCG: 0.03 TABLET ORAL at 05:38

## 2025-08-04 RX ADMIN — ASPIRIN 81 MG: 81 TABLET, COATED ORAL at 09:32

## 2025-08-04 RX ADMIN — TAMSULOSIN HYDROCHLORIDE 0.4 MG: 0.4 CAPSULE ORAL at 09:32

## 2025-08-04 RX ADMIN — METOPROLOL SUCCINATE 50 MG: 50 TABLET, EXTENDED RELEASE ORAL at 09:32

## 2025-08-05 LAB
QT INTERVAL: 360 MS
QT INTERVAL: 398 MS
QTC INTERVAL: 387 MS
QTC INTERVAL: 420 MS

## (undated) DEVICE — FRCP BIOP COLD ENDOJAW ALLGTR W/NDL 2.8X2300MM BLU

## (undated) DEVICE — SYS CLS SKIN PREMIERPRO EXOFINFUSION 22CM

## (undated) DEVICE — Device

## (undated) DEVICE — PAD GRND REM POLYHESIVE A/ DISP

## (undated) DEVICE — SLV SCD CALF HEMOFORCE DVT THERP REPROC MD

## (undated) DEVICE — 2000CC GUARDIAN II: Brand: GUARDIAN

## (undated) DEVICE — ENDOGATOR AUXILIARY WATER JET CONNECTOR: Brand: ENDOGATOR

## (undated) DEVICE — GLV SURG SENSICARE PI LF PF 8 GRN STRL

## (undated) DEVICE — 1000 S-DRAPE TOWEL DRAPE 10/BX: Brand: STERI-DRAPE™

## (undated) DEVICE — DRSNG WND BORDR/ADHS NONADHR/GZ LF 4X10IN STRL

## (undated) DEVICE — JELLY,LUBE,STERILE,FLIP TOP,TUBE,2-OZ: Brand: MEDLINE

## (undated) DEVICE — PILLW ABD SM

## (undated) DEVICE — YANKAUER,BULB TIP,W/O VENT,RIGID,STERILE: Brand: MEDLINE

## (undated) DEVICE — SUT VICYL 2/0 CR8 18IN DYED J726D

## (undated) DEVICE — DRP SURG U/DRP INVISISHIELD PA 48X52IN

## (undated) DEVICE — ANTIBACTERIAL UNDYED BRAIDED (POLYGLACTIN 910), SYNTHETIC ABSORBABLE SUTURE: Brand: COATED VICRYL

## (undated) DEVICE — GLV SURG BIOGEL PI ULTRATOUCH G SZ7.5 LF

## (undated) DEVICE — CONMED SCOPE SAVER BITE BLOCK, 20X27 MM: Brand: SCOPE SAVER

## (undated) DEVICE — 450 ML BOTTLE OF 0.05% CHLORHEXIDINE GLUCONATE IN 99.95% STERILE WATER FOR IRRIGATION, USP AND APPLICATOR.: Brand: IRRISEPT ANTIMICROBIAL WOUND LAVAGE

## (undated) DEVICE — PK HIP GEN 20

## (undated) DEVICE — SYR PREFIL W/SALINE FLSH 10ML

## (undated) DEVICE — 3M™ STERI-DRAPE™ INSTRUMENT POUCH 1018: Brand: STERI-DRAPE™

## (undated) DEVICE — SUT ETHIB 2 CV V37 MS/4 30IN MX69G

## (undated) DEVICE — DRAPE,HIP,W/POUCHES,STERILE: Brand: MEDLINE

## (undated) DEVICE — HANDPIECE SET WITH HIGH FLOW TIP AND SUCTION TUBE: Brand: INTERPULSE

## (undated) DEVICE — 2108 SERIES SAGITTAL BLADE, NO OFFSET  (24.8 X 1.24 X 80.1MM)

## (undated) DEVICE — SOL IRR NACL 0.9PCT 3000ML

## (undated) DEVICE — ZIPPERED TOGA, X-LARGE: Brand: FLYTE